# Patient Record
Sex: FEMALE | Race: WHITE | NOT HISPANIC OR LATINO | URBAN - METROPOLITAN AREA
[De-identification: names, ages, dates, MRNs, and addresses within clinical notes are randomized per-mention and may not be internally consistent; named-entity substitution may affect disease eponyms.]

---

## 2017-06-02 ENCOUNTER — HOSPITAL ENCOUNTER (EMERGENCY)
Facility: CLINIC | Age: 27
Discharge: HOME | End: 2017-06-02
Attending: EMERGENCY MEDICINE | Admitting: EMERGENCY MEDICINE

## 2017-06-02 VITALS
DIASTOLIC BLOOD PRESSURE: 56 MMHG | WEIGHT: 214 LBS | TEMPERATURE: 99.9 F | HEIGHT: 63 IN | RESPIRATION RATE: 18 BRPM | HEART RATE: 97 BPM | OXYGEN SATURATION: 98 % | SYSTOLIC BLOOD PRESSURE: 146 MMHG | BODY MASS INDEX: 37.92 KG/M2

## 2017-06-02 DIAGNOSIS — M54.9 BACK PAIN, UNSPECIFIED BACK LOCATION, UNSPECIFIED BACK PAIN LATERALITY, UNSPECIFIED CHRONICITY: ICD-10-CM

## 2017-06-02 DIAGNOSIS — R00.0 TACHYCARDIA: ICD-10-CM

## 2017-06-02 LAB
ALBUMIN SERPL-MCNC: 2.8 G/DL (ref 3.4–5)
ALBUMIN UR-MCNC: NEGATIVE MG/DL
ALP SERPL-CCNC: 100 U/L (ref 40–150)
ALT SERPL W P-5'-P-CCNC: 56 U/L (ref 0–50)
ANION GAP SERPL CALCULATED.3IONS-SCNC: 6 MMOL/L (ref 3–14)
APPEARANCE UR: CLEAR
AST SERPL W P-5'-P-CCNC: 39 U/L (ref 0–45)
BASOPHILS # BLD AUTO: 0 10E9/L (ref 0–0.2)
BASOPHILS NFR BLD AUTO: 0.1 %
BILIRUB SERPL-MCNC: 0.4 MG/DL (ref 0.2–1.3)
BILIRUB UR QL STRIP: NEGATIVE
BUN SERPL-MCNC: 8 MG/DL (ref 7–30)
CALCIUM SERPL-MCNC: 8.6 MG/DL (ref 8.5–10.1)
CHLORIDE SERPL-SCNC: 100 MMOL/L (ref 94–109)
CO2 SERPL-SCNC: 28 MMOL/L (ref 20–32)
COLOR UR AUTO: YELLOW
CREAT SERPL-MCNC: 0.67 MG/DL (ref 0.52–1.04)
DIFFERENTIAL METHOD BLD: ABNORMAL
EOSINOPHIL # BLD AUTO: 0.1 10E9/L (ref 0–0.7)
EOSINOPHIL NFR BLD AUTO: 1 %
ERYTHROCYTE [DISTWIDTH] IN BLOOD BY AUTOMATED COUNT: 13.9 % (ref 10–15)
GFR SERPL CREATININE-BSD FRML MDRD: ABNORMAL ML/MIN/1.7M2
GLUCOSE SERPL-MCNC: 139 MG/DL (ref 70–99)
GLUCOSE UR STRIP-MCNC: NEGATIVE MG/DL
HCT VFR BLD AUTO: 34.6 % (ref 35–47)
HGB BLD-MCNC: 11.5 G/DL (ref 11.7–15.7)
HGB UR QL STRIP: NEGATIVE
IMM GRANULOCYTES # BLD: 0.1 10E9/L (ref 0–0.4)
IMM GRANULOCYTES NFR BLD: 1.5 %
KETONES UR STRIP-MCNC: NEGATIVE MG/DL
LACTATE BLD-SCNC: 1 MMOL/L (ref 0.7–2.1)
LEUKOCYTE ESTERASE UR QL STRIP: NEGATIVE
LYMPHOCYTES # BLD AUTO: 2 10E9/L (ref 0.8–5.3)
LYMPHOCYTES NFR BLD AUTO: 22.4 %
MCH RBC QN AUTO: 28.3 PG (ref 26.5–33)
MCHC RBC AUTO-ENTMCNC: 33.2 G/DL (ref 31.5–36.5)
MCV RBC AUTO: 85 FL (ref 78–100)
MONOCYTES # BLD AUTO: 0.7 10E9/L (ref 0–1.3)
MONOCYTES NFR BLD AUTO: 7.6 %
MUCOUS THREADS #/AREA URNS LPF: PRESENT /LPF
NEUTROPHILS # BLD AUTO: 6 10E9/L (ref 1.6–8.3)
NEUTROPHILS NFR BLD AUTO: 67.4 %
NITRATE UR QL: NEGATIVE
NRBC # BLD AUTO: 0 10*3/UL
NRBC BLD AUTO-RTO: 0 /100
PH UR STRIP: 6 PH (ref 5–7)
PLATELET # BLD AUTO: 338 10E9/L (ref 150–450)
POTASSIUM SERPL-SCNC: 3.7 MMOL/L (ref 3.4–5.3)
PROT SERPL-MCNC: 7.6 G/DL (ref 6.8–8.8)
RBC # BLD AUTO: 4.07 10E12/L (ref 3.8–5.2)
RBC #/AREA URNS AUTO: 1 /HPF (ref 0–2)
SODIUM SERPL-SCNC: 134 MMOL/L (ref 133–144)
SP GR UR STRIP: 1.02 (ref 1–1.03)
SQUAMOUS #/AREA URNS AUTO: 1 /HPF (ref 0–1)
TROPONIN I SERPL-MCNC: NORMAL UG/L (ref 0–0.04)
URN SPEC COLLECT METH UR: ABNORMAL
UROBILINOGEN UR STRIP-MCNC: NORMAL MG/DL (ref 0–2)
WBC # BLD AUTO: 8.9 10E9/L (ref 4–11)
WBC #/AREA URNS AUTO: 2 /HPF (ref 0–2)

## 2017-06-02 PROCEDURE — 93010 ELECTROCARDIOGRAM REPORT: CPT | Mod: Z6 | Performed by: EMERGENCY MEDICINE

## 2017-06-02 PROCEDURE — 96375 TX/PRO/DX INJ NEW DRUG ADDON: CPT | Mod: 59 | Performed by: EMERGENCY MEDICINE

## 2017-06-02 PROCEDURE — 96361 HYDRATE IV INFUSION ADD-ON: CPT | Performed by: EMERGENCY MEDICINE

## 2017-06-02 PROCEDURE — 83605 ASSAY OF LACTIC ACID: CPT | Performed by: EMERGENCY MEDICINE

## 2017-06-02 PROCEDURE — 81001 URINALYSIS AUTO W/SCOPE: CPT | Performed by: EMERGENCY MEDICINE

## 2017-06-02 PROCEDURE — 99284 EMERGENCY DEPT VISIT MOD MDM: CPT | Mod: 25 | Performed by: EMERGENCY MEDICINE

## 2017-06-02 PROCEDURE — 87086 URINE CULTURE/COLONY COUNT: CPT | Performed by: EMERGENCY MEDICINE

## 2017-06-02 PROCEDURE — 80053 COMPREHEN METABOLIC PANEL: CPT | Performed by: EMERGENCY MEDICINE

## 2017-06-02 PROCEDURE — 96374 THER/PROPH/DIAG INJ IV PUSH: CPT | Mod: 59 | Performed by: EMERGENCY MEDICINE

## 2017-06-02 PROCEDURE — 84484 ASSAY OF TROPONIN QUANT: CPT | Performed by: EMERGENCY MEDICINE

## 2017-06-02 PROCEDURE — 93005 ELECTROCARDIOGRAM TRACING: CPT | Performed by: EMERGENCY MEDICINE

## 2017-06-02 PROCEDURE — 25000128 H RX IP 250 OP 636: Performed by: EMERGENCY MEDICINE

## 2017-06-02 PROCEDURE — 99285 EMERGENCY DEPT VISIT HI MDM: CPT | Mod: 25 | Performed by: EMERGENCY MEDICINE

## 2017-06-02 PROCEDURE — 85025 COMPLETE CBC W/AUTO DIFF WBC: CPT | Performed by: EMERGENCY MEDICINE

## 2017-06-02 RX ORDER — KETOROLAC TROMETHAMINE 30 MG/ML
15 INJECTION, SOLUTION INTRAMUSCULAR; INTRAVENOUS ONCE
Status: COMPLETED | OUTPATIENT
Start: 2017-06-02 | End: 2017-06-02

## 2017-06-02 RX ADMIN — HYDROMORPHONE HYDROCHLORIDE 1 MG: 1 INJECTION, SOLUTION INTRAMUSCULAR; INTRAVENOUS; SUBCUTANEOUS at 00:37

## 2017-06-02 RX ADMIN — SODIUM CHLORIDE 1000 ML: 9 INJECTION, SOLUTION INTRAVENOUS at 00:37

## 2017-06-02 RX ADMIN — KETOROLAC TROMETHAMINE 15 MG: 30 INJECTION, SOLUTION INTRAMUSCULAR at 01:09

## 2017-06-02 NOTE — ED NOTES
Pt presents to ED with complaints of back pain and chest pain making it difficult to breath. Pt states she was admitted for [facility] 5/25-5/30 for abdominal pain and was d/c with plans to have endoscopy and colonoscopy outpatient. Pt states she developed these symptoms and her fever returned to 100.3 at home. Sepsis protocol alerted, order set started by triage RN.

## 2017-06-02 NOTE — ED AVS SNAPSHOT
[facility], Emergency Department    [address] [address]    Phone:  [phone]                                       Dahlia Hayes   MRN: [id number]    Department:  [facility], Emergency Department   Date of Visit:  6/2/2017           Patient Information     Date Of Birth          1990        Your diagnoses for this visit were:     Back pain, unspecified back location, unspecified back pain laterality, unspecified chronicity        You were seen by Dante Peoples MD.        Discharge Instructions       Please follow up with your endoscopy later today.        24 Hour Appointment Hotline       To make an appointment at any [facility], call [phone] ([phone]). If you don't have a family doctor or clinic, we will help you find one. [facility] are conveniently located to serve the needs of you and your family.             Review of your medicines      Our records show that you are taking the medicines listed below. If these are incorrect, please call your family doctor or clinic.        Dose / Directions Last dose taken    * amphetamine-dextroamphetamine 30 MG per 24 hr capsule   Commonly known as:  ADDERALL XR        Refills:  0        * amphetamine-dextroamphetamine 10 MG per 24 hr capsule   Commonly known as:  ADDERALL XR        as needed   Refills:  0        BENZONATATE PO   Dose:  100 mg        Take 100 mg by mouth 3 times daily as needed for cough   Refills:  0        escitalopram 10 MG tablet   Commonly known as:  LEXAPRO        Refills:  0        OXYCODONE HCL PO   Dose:  5 mg        Take 5 mg by mouth every 4 hours as needed   Refills:  0        PANTOPRAZOLE SODIUM PO   Dose:  40 mg        Take 40 mg by mouth 2 times daily (before meals)   Refills:  0        * Notice:  This list has 2 medication(s) that are the same as other medications prescribed for you. Read the directions carefully, and ask your doctor or other care provider to review them  with you.            Procedures and tests performed during your visit     CBC with platelets differential    Comprehensive metabolic panel    EKG 12 lead    Lactic acid    Troponin I    UA with Microscopic    Urine Culture      Orders Needing Specimen Collection     None      Pending Results     Date and Time Order Name Status Description    6/2/2017 0104 EKG 12 lead Preliminary     6/2/2017 0034 Urine Culture In process             Pending Culture Results     Date and Time Order Name Status Description    6/2/2017 0034 Urine Culture In process             Pending Results Instructions     If you had any lab results that were not finalized at the time of your Discharge, you can call the ED Lab Result RN at [phone]. You will be contacted by this team for any positive Lab results or changes in treatment. The nurses are available 7 days a week from 10A to 6:30P.  You can leave a message 24 hours per day and they will return your call.        Thank you for choosing [facility]       Thank you for choosing [facility] for your care. Our goal is always to provide you with excellent care. Hearing back from our patients is one way we can continue to improve our services. Please take a few minutes to complete the written survey that you may receive in the mail after you visit with us. Thank you!        Care EveryWhere ID     This is your Care EveryWhere ID. This could be used by other organizations to access your [facility] medical records  [id number]        After Visit Summary       This is your record. Keep this with you and show to your community pharmacist(s) and doctor(s) at your next visit.

## 2017-06-02 NOTE — ED AVS SNAPSHOT
[facility], [place], Emergency Department    [address] [place] [address]    Phone:  [phone]                                       Dahlia Hayes   MRN: [id number]    Department:  [facility], Emergency Department   Date of Visit:  6/2/2017           After Visit Summary Signature Page     I have received my discharge instructions, and my questions have been answered. I have discussed any challenges I see with this plan with the nurse or doctor.    ..........................................................................................................................................  Patient/Patient Representative Signature      ..........................................................................................................................................  Patient Representative Print Name and Relationship to Patient    ..................................................               ................................................  Date                                            Time    ..........................................................................................................................................  Reviewed by Signature/Title    ...................................................              ..............................................  Date                                                            Time

## 2017-06-02 NOTE — ED PROVIDER NOTES
"  History     Chief Complaint   Patient presents with     Back Pain     Shortness of Breath     HPI  Dahlia Hayes is a 26 year old female with a history of ADHD, anxiety, and depression who presents to the ED for evaluation of back pain. The patient reports that she was admitted to [facility] 5/25-5/30 for bloody diarrhea and abdominal pain, where she underwent CT, ultrasound, and chest x-ray; \"CT abdomen showed mild fluid retention and some mesenteric stranding with liquid stool, questioning infectious vs inflammatory etiology.\" and so she was scheduled for EGD at 1:30 PM today and colonoscopy next week. The patient comes in today with complaint of diffuse mid-back pain as well as bilateral shoulder pain that began 2 days ago and which has been worsening since. Pain occurs on both sides of her back. She reports that the pain is intensely provoked with deep breaths and with coughs. She denies a history of this pain. The patient reports that she's had resolution of her diarrhea, though she has continued to have abdominal. Last stool was 2 days ago. No vomiting. She denies any urinary symptoms such as dysuria or hematuria. She does report a temperature up to 100.1  F at home earlier, did not take an antipyretic prior to arrival. She denies any other symptoms.    I have reviewed the Medications, Allergies, Past Medical and Surgical History, and Social History in the Epic system.    Current Facility-Administered Medications   Medication     0.9% sodium chloride BOLUS     Current Outpatient Prescriptions   Medication     BENZONATATE PO     OXYCODONE HCL PO     PANTOPRAZOLE SODIUM PO     amphetamine-dextroamphetamine (ADDERALL XR) 10 MG per capsule     amphetamine-dextroamphetamine (ADDERALL XR) 30 MG per capsule     escitalopram (LEXAPRO) 10 MG tablet     Past Medical History:   Diagnosis Date     ADHD (attention deficit hyperactivity disorder)     adderall      Anxiety     lexapro  Dr. Carrillo PN      breast lumps 2015 " "   benign on mammogram      Chronic tonsillitis        Past Surgical History:   Procedure Laterality Date     HEAD & NECK SURGERY      WISDOM TEETH     TONSILLECTOMY ADULT  6/7/2012    Procedure:TONSILLECTOMY ADULT; TONSILLECTOMY ; Surgeon:JAMES BURNS; Location:[facility]       Family History   Problem Relation Age of Onset     Anxiety Disorder Mother      Anxiety Disorder Maternal Grandmother      Anxiety Disorder Sister      Anxiety Disorder Brother      Substance Abuse Sister      Substance Abuse Brother      MENTAL ILLNESS Mother      MENTAL ILLNESS Brother      Depression Mother      Depression Maternal Grandmother      Thyroid Disease Mother        Social History   Substance Use Topics     Smoking status: Never Smoker     Smokeless tobacco: Never Used     Alcohol use No      No Known Allergies    Review of Systems   All other systems reviewed and are negative.    14 pt ROS completed and negative except as noted above in HPI    Physical Exam   Pulse: 119  Temp: 99.9  F (37.7  C)  Resp: 22  Height: 160 cm (5' 3\")  Weight: 97.1 kg (214 lb)  SpO2: 96 %  Physical Exam   Constitutional: She is oriented to person, place, and time. No distress.   HENT:   Head: Normocephalic and atraumatic.   Right Ear: External ear normal.   Left Ear: External ear normal.   Mouth/Throat: Oropharynx is clear and moist. No oropharyngeal exudate.   Eyes: Conjunctivae are normal. Pupils are equal, round, and reactive to light. Right eye exhibits no discharge. Left eye exhibits no discharge.   Neck: Normal range of motion. Neck supple.   Cardiovascular: Intact distal pulses.    tachycardic   Pulmonary/Chest: Effort normal. No respiratory distress. She has no wheezes. She has no rales.   Abdominal: She exhibits no distension. There is no tenderness. There is no rebound and no guarding.   Musculoskeletal: Normal range of motion. She exhibits no edema or tenderness.   Neurological: She is alert and oriented to person, place, and time. She " exhibits normal muscle tone.   Skin: Skin is warm and dry. No rash noted. She is not diaphoretic.   Psychiatric: She has a normal mood and affect. Her behavior is normal. Thought content normal.   Nursing note and vitals reviewed.      ED Course     ED Course     Procedures             EKG Interpretation:      Interpreted by Dante Peoples  Time reviewed: 110  Symptoms at time of EKG: tachycardia  Rhythm: ST  Rate: 120  Axis: normal  Ectopy: none  Conduction: normal  ST Segments/ T Waves: No ST-T wave changes  Q Waves: none  Comparison to prior: No old EKG available    Clinical Impression: ST            Critical Care time:  none             Labs Ordered and Resulted from Time of ED Arrival Up to the Time of Departure from the ED - No data to display         Assessments & Plan (with Medical Decision Making)   1.  Back pain    28 yo F who was recently admitted for abdominal pain and bloody stools who presents with back pain.  She is scheduled for an EGD and colonoscopy for further evaluation.  The patient was tachycardic, but afebrile.  Her exam was benign as was her lab evaluation with a lactic acid of 1 and WBC of 8.9. Her urinalysis showed no hematuria or signs of infection.  Pain was improved with Toradol and Dilaudid and repeat exam again was benign.  Pain could be from inflammatory process causign her diarrhea.  Recent imaging was unremarkable.  No evidence of SBI.  She will be discharged and follow up with her endoscopy and colonoscopy and return if worsening symptoms.       I have reviewed the nursing notes.    I have reviewed the findings, diagnosis, plan and need for follow up with the patient.    New Prescriptions    No medications on file       Final diagnoses:   None     IOneil, am serving as a trained medical scribe to document services personally performed by Dante Peoples MD, based on the provider's statements to me.      IDante MD, was physically present and have  reviewed and verified the accuracy of this note documented by Oneil Marquez.    6/2/2017   [facility], [place], EMERGENCY DEPARTMENT     Dante Peoples MD  07/31/17 0920

## 2017-06-03 ENCOUNTER — APPOINTMENT (OUTPATIENT)
Dept: CT IMAGING | Facility: CLINIC | Age: 27
DRG: 194 | End: 2017-06-03
Attending: EMERGENCY MEDICINE

## 2017-06-03 ENCOUNTER — HOSPITAL ENCOUNTER (INPATIENT)
Facility: CLINIC | Age: 27
LOS: 2 days | Discharge: HOME | DRG: 194 | End: 2017-06-06
Attending: EMERGENCY MEDICINE | Admitting: INTERNAL MEDICINE

## 2017-06-03 DIAGNOSIS — J18.9 PNEUMONIA OF RIGHT LOWER LOBE DUE TO INFECTIOUS ORGANISM: ICD-10-CM

## 2017-06-03 DIAGNOSIS — J90 PLEURAL EFFUSION: ICD-10-CM

## 2017-06-03 DIAGNOSIS — R07.81 PLEURITIC CHEST PAIN: ICD-10-CM

## 2017-06-03 LAB
ALBUMIN SERPL-MCNC: 2.6 G/DL (ref 3.4–5)
ALP SERPL-CCNC: 115 U/L (ref 40–150)
ALT SERPL W P-5'-P-CCNC: 56 U/L (ref 0–50)
ANION GAP SERPL CALCULATED.3IONS-SCNC: 6 MMOL/L (ref 3–14)
AST SERPL W P-5'-P-CCNC: 42 U/L (ref 0–45)
BACTERIA SPEC CULT: NORMAL
BASOPHILS # BLD AUTO: 0 10E9/L (ref 0–0.2)
BASOPHILS NFR BLD AUTO: 0.1 %
BILIRUB SERPL-MCNC: 0.4 MG/DL (ref 0.2–1.3)
BUN SERPL-MCNC: 4 MG/DL (ref 7–30)
CALCIUM SERPL-MCNC: 8.7 MG/DL (ref 8.5–10.1)
CHLORIDE SERPL-SCNC: 103 MMOL/L (ref 94–109)
CO2 SERPL-SCNC: 28 MMOL/L (ref 20–32)
CREAT SERPL-MCNC: 0.51 MG/DL (ref 0.52–1.04)
CRP SERPL-MCNC: 150 MG/L (ref 0–8)
D DIMER PPP FEU-MCNC: 3.8 UG/ML FEU (ref 0–0.5)
DIFFERENTIAL METHOD BLD: ABNORMAL
EOSINOPHIL # BLD AUTO: 0 10E9/L (ref 0–0.7)
EOSINOPHIL NFR BLD AUTO: 0.2 %
ERYTHROCYTE [DISTWIDTH] IN BLOOD BY AUTOMATED COUNT: 13.8 % (ref 10–15)
ERYTHROCYTE [SEDIMENTATION RATE] IN BLOOD BY WESTERGREN METHOD: 70 MM/H (ref 0–20)
GFR SERPL CREATININE-BSD FRML MDRD: ABNORMAL ML/MIN/1.7M2
GLUCOSE SERPL-MCNC: 111 MG/DL (ref 70–99)
HCT VFR BLD AUTO: 35.1 % (ref 35–47)
HGB BLD-MCNC: 11.6 G/DL (ref 11.7–15.7)
IMM GRANULOCYTES # BLD: 0.1 10E9/L (ref 0–0.4)
IMM GRANULOCYTES NFR BLD: 0.6 %
INTERPRETATION ECG - MUSE: NORMAL
INTERPRETATION ECG - MUSE: NORMAL
LACTATE BLD-SCNC: 1.4 MMOL/L (ref 0.7–2.1)
LYMPHOCYTES # BLD AUTO: 1.9 10E9/L (ref 0.8–5.3)
LYMPHOCYTES NFR BLD AUTO: 13.8 %
Lab: NORMAL
MCH RBC QN AUTO: 28.5 PG (ref 26.5–33)
MCHC RBC AUTO-ENTMCNC: 33 G/DL (ref 31.5–36.5)
MCV RBC AUTO: 86 FL (ref 78–100)
MICRO REPORT STATUS: NORMAL
MONOCYTES # BLD AUTO: 0.9 10E9/L (ref 0–1.3)
MONOCYTES NFR BLD AUTO: 6.6 %
NEUTROPHILS # BLD AUTO: 10.9 10E9/L (ref 1.6–8.3)
NEUTROPHILS NFR BLD AUTO: 78.7 %
NRBC # BLD AUTO: 0 10*3/UL
NRBC BLD AUTO-RTO: 0 /100
PLATELET # BLD AUTO: 447 10E9/L (ref 150–450)
POTASSIUM SERPL-SCNC: 3.9 MMOL/L (ref 3.4–5.3)
PROT SERPL-MCNC: 7.8 G/DL (ref 6.8–8.8)
RBC # BLD AUTO: 4.07 10E12/L (ref 3.8–5.2)
SODIUM SERPL-SCNC: 137 MMOL/L (ref 133–144)
SPECIMEN SOURCE: NORMAL
WBC # BLD AUTO: 13.9 10E9/L (ref 4–11)

## 2017-06-03 PROCEDURE — 25000128 H RX IP 250 OP 636: Performed by: EMERGENCY MEDICINE

## 2017-06-03 PROCEDURE — 83605 ASSAY OF LACTIC ACID: CPT | Performed by: EMERGENCY MEDICINE

## 2017-06-03 PROCEDURE — 85652 RBC SED RATE AUTOMATED: CPT | Performed by: EMERGENCY MEDICINE

## 2017-06-03 PROCEDURE — 80053 COMPREHEN METABOLIC PANEL: CPT | Performed by: EMERGENCY MEDICINE

## 2017-06-03 PROCEDURE — 85379 FIBRIN DEGRADATION QUANT: CPT | Performed by: EMERGENCY MEDICINE

## 2017-06-03 PROCEDURE — 96367 TX/PROPH/DG ADDL SEQ IV INF: CPT

## 2017-06-03 PROCEDURE — 96375 TX/PRO/DX INJ NEW DRUG ADDON: CPT

## 2017-06-03 PROCEDURE — 87040 BLOOD CULTURE FOR BACTERIA: CPT | Performed by: EMERGENCY MEDICINE

## 2017-06-03 PROCEDURE — 86140 C-REACTIVE PROTEIN: CPT | Performed by: EMERGENCY MEDICINE

## 2017-06-03 PROCEDURE — 96376 TX/PRO/DX INJ SAME DRUG ADON: CPT

## 2017-06-03 PROCEDURE — 25000125 ZZHC RX 250: Performed by: EMERGENCY MEDICINE

## 2017-06-03 PROCEDURE — 25000132 ZZH RX MED GY IP 250 OP 250 PS 637: Performed by: EMERGENCY MEDICINE

## 2017-06-03 PROCEDURE — 93005 ELECTROCARDIOGRAM TRACING: CPT

## 2017-06-03 PROCEDURE — 99285 EMERGENCY DEPT VISIT HI MDM: CPT | Mod: 25

## 2017-06-03 PROCEDURE — 85025 COMPLETE CBC W/AUTO DIFF WBC: CPT | Performed by: EMERGENCY MEDICINE

## 2017-06-03 PROCEDURE — 71260 CT THORAX DX C+: CPT

## 2017-06-03 PROCEDURE — 25000128 H RX IP 250 OP 636

## 2017-06-03 PROCEDURE — 96365 THER/PROPH/DIAG IV INF INIT: CPT

## 2017-06-03 RX ORDER — PIPERACILLIN SODIUM, TAZOBACTAM SODIUM 4; .5 G/20ML; G/20ML
4.5 INJECTION, POWDER, LYOPHILIZED, FOR SOLUTION INTRAVENOUS ONCE
Status: COMPLETED | OUTPATIENT
Start: 2017-06-03 | End: 2017-06-04

## 2017-06-03 RX ORDER — KETOROLAC TROMETHAMINE 30 MG/ML
30 INJECTION, SOLUTION INTRAMUSCULAR; INTRAVENOUS ONCE
Status: COMPLETED | OUTPATIENT
Start: 2017-06-03 | End: 2017-06-03

## 2017-06-03 RX ORDER — HYDROMORPHONE HYDROCHLORIDE 1 MG/ML
0.5 INJECTION, SOLUTION INTRAMUSCULAR; INTRAVENOUS; SUBCUTANEOUS
Status: COMPLETED | OUTPATIENT
Start: 2017-06-03 | End: 2017-06-03

## 2017-06-03 RX ORDER — ACETAMINOPHEN 500 MG
1000 TABLET ORAL ONCE
Status: COMPLETED | OUTPATIENT
Start: 2017-06-03 | End: 2017-06-03

## 2017-06-03 RX ORDER — SODIUM CHLORIDE 9 MG/ML
INJECTION, SOLUTION INTRAVENOUS CONTINUOUS
Status: DISCONTINUED | OUTPATIENT
Start: 2017-06-03 | End: 2017-06-06

## 2017-06-03 RX ORDER — LEVOFLOXACIN 5 MG/ML
750 INJECTION, SOLUTION INTRAVENOUS ONCE
Status: COMPLETED | OUTPATIENT
Start: 2017-06-03 | End: 2017-06-04

## 2017-06-03 RX ORDER — IOPAMIDOL 755 MG/ML
76 INJECTION, SOLUTION INTRAVASCULAR ONCE
Status: COMPLETED | OUTPATIENT
Start: 2017-06-03 | End: 2017-06-03

## 2017-06-03 RX ADMIN — HYDROMORPHONE HYDROCHLORIDE 0.5 MG: 1 INJECTION, SOLUTION INTRAMUSCULAR; INTRAVENOUS; SUBCUTANEOUS at 19:47

## 2017-06-03 RX ADMIN — HYDROMORPHONE HYDROCHLORIDE 0.5 MG: 1 INJECTION, SOLUTION INTRAMUSCULAR; INTRAVENOUS; SUBCUTANEOUS at 21:16

## 2017-06-03 RX ADMIN — ACETAMINOPHEN 1000 MG: 500 TABLET, FILM COATED ORAL at 23:36

## 2017-06-03 RX ADMIN — KETOROLAC TROMETHAMINE 30 MG: 30 INJECTION, SOLUTION INTRAMUSCULAR at 22:08

## 2017-06-03 RX ADMIN — SODIUM CHLORIDE 99 ML: 9 INJECTION, SOLUTION INTRAVENOUS at 21:46

## 2017-06-03 RX ADMIN — PIPERACILLIN AND TAZOBACTAM 4.5 G: 4; .5 INJECTION, POWDER, FOR SOLUTION INTRAVENOUS at 23:27

## 2017-06-03 RX ADMIN — IOPAMIDOL 76 ML: 755 INJECTION, SOLUTION INTRAVENOUS at 21:45

## 2017-06-03 RX ADMIN — SODIUM CHLORIDE 2913 ML: 9 INJECTION, SOLUTION INTRAVENOUS at 19:46

## 2017-06-03 RX ADMIN — HYDROMORPHONE HYDROCHLORIDE 0.5 MG: 1 INJECTION, SOLUTION INTRAMUSCULAR; INTRAVENOUS; SUBCUTANEOUS at 20:31

## 2017-06-03 ASSESSMENT — ENCOUNTER SYMPTOMS
COUGH: 1
SHORTNESS OF BREATH: 1
APPETITE CHANGE: 1
ABDOMINAL PAIN: 1
FEVER: 1

## 2017-06-03 NOTE — IP AVS SNAPSHOT
[facility] [address] [facility]    [address] [place] [address]    Phone:  [phone]                                       After Visit Summary   6/3/2017    Dahlia Hayes    MRN: [id number]           After Visit Summary Signature Page     I have received my discharge instructions, and my questions have been answered. I have discussed any challenges I see with this plan with the nurse or doctor.    ..........................................................................................................................................  Patient/Patient Representative Signature      ..........................................................................................................................................  Patient Representative Print Name and Relationship to Patient    ..................................................               ................................................  Date                                            Time    ..........................................................................................................................................  Reviewed by Signature/Title    ...................................................              ..............................................  Date                                                            Time

## 2017-06-03 NOTE — IP AVS SNAPSHOT
MRN:[id number]                      After Visit Summary   6/3/2017    Dahlia Hayes    MRN: [id number]           Thank you!     Thank you for choosing [facility] for your care. Our goal is always to provide you with excellent care. Hearing back from our patients is one way we can continue to improve our services. Please take a few minutes to complete the written survey that you may receive in the mail after you visit with us. Thank you!        Patient Information     Date Of Birth          1990        Designated Caregiver       Most Recent Value    Caregiver    Will someone help with your care after discharge? no      About your hospital stay     You were admitted on:  June 4, 2017 You last received care in the:  [facility] [address] [facility]    You were discharged on:  June 6, 2017        Reason for your hospital stay       Pleural effusion.                  Who to Call     For medical emergencies, please call 911.  For non-urgent questions about your medical care, please call your primary care provider or clinic, [phone]          Attending Provider     Provider Specialty    Patricia Ludwig MD Emergency Medicine    [facility], Felice Vazquez MD Internal Medicine       Primary Care Provider Office Phone # Fax #    Isidra Mejias [phone]      After Care Instructions     Activity       Your activity upon discharge: activity as tolerated            Diet       Follow this diet upon discharge: Orders Placed This Encounter      Regular Diet Adult                  Follow-up Appointments     Follow-up and recommended labs and tests        Appointment with DR LARRY 6/13 @12:45            Follow-up and recommended labs and tests        Follow up with primary care provider, ISIDRA MEJIAS, within 7 days for hospital follow- up.  The following labs/tests are recommended: cbc/bmp/CXR.    Follow up with Dr. Cummins from pulmonology in 1 month, call to schedule appointment  at [phone]                  Future tests that were ordered for you     Basic metabolic panel           CBC with platelets       Last Lab Result: Hemoglobin (g/dL)       Date                     Value                 06/06/2017               10.7 (L)         ----------            XR Chest 2 Views                 Pending Results     Date and Time Order Name Status Description    6/4/2017 0214 Sputum Culture Aerobic Bacterial Preliminary     6/3/2017 1931 Blood culture Preliminary     6/3/2017 1931 Blood culture Preliminary             Statement of Approval     Ordered          06/06/17 1417  I have reviewed and agree with all the recommendations and orders detailed in this document.  EFFECTIVE NOW     Approved and electronically signed by:  Francesco Mosher DO             Admission Information     Date & Time Provider Department Dept. Phone    6/3/2017 Felice uSe MD [facility] [address] [facility] [phone]      Your Vitals Were     Blood Pressure Pulse Temperature Respirations Weight Pulse Oximetry    123/73 (BP Location: Left arm) 72 99  F (37.2  C) (Oral) 16 101.2 kg (223 lb 1.7 oz) 93%    BMI (Body Mass Index)                   39.52 kg/m2           Care EveryWhere ID     This is your Care EveryWhere ID. This could be used by other organizations to access your [facility] medical records  [id number]           Review of your medicines      START taking        Dose / Directions    levofloxacin 500 MG tablet   Commonly known as:  LEVAQUIN   Used for:  Pneumonia of right lower lobe due to infectious organism        Dose:  500 mg   Take 1 tablet (500 mg) by mouth daily for 6 days   Quantity:  6 tablet   Refills:  0         CONTINUE these medicines which have NOT CHANGED        Dose / Directions    * amphetamine-dextroamphetamine 30 MG per 24 hr capsule   Commonly known as:  ADDERALL XR   Used for:  Visit for preventive health examination        Dose:  30 mg   Take 30 mg by mouth daily    Refills:  0       * amphetamine-dextroamphetamine 10 MG per 24 hr capsule   Commonly known as:  ADDERALL XR   Used for:  Visit for preventive health examination        Dose:  10 mg   10 mg daily   Refills:  0       BENZONATATE PO        Dose:  100 mg   Take 100 mg by mouth 3 times daily as needed for cough   Refills:  0       escitalopram 10 MG tablet   Commonly known as:  LEXAPRO   Used for:  Visit for preventive health examination        Dose:  10 mg   Take 10 mg by mouth daily   Refills:  0       OXYCODONE HCL PO        Dose:  5 mg   Take 5 mg by mouth every 4 hours as needed   Refills:  0       PANTOPRAZOLE SODIUM PO        Dose:  40 mg   Take 40 mg by mouth 2 times daily (before meals)   Refills:  0       * Notice:  This list has 2 medication(s) that are the same as other medications prescribed for you. Read the directions carefully, and ask your doctor or other care provider to review them with you.         Where to get your medicines      These medications were sent to [facility] - [address]  [address], [place] [address]     Phone:  [phone]     levofloxacin 500 MG tablet                Protect others around you: Learn how to safely use, store and throw away your medicines at www.disposemymeds.org.             Medication List: This is a list of all your medications and when to take them. Check marks below indicate your daily home schedule. Keep this list as a reference.      Medications           Morning Afternoon Evening Bedtime As Needed    * amphetamine-dextroamphetamine 30 MG per 24 hr capsule   Commonly known as:  ADDERALL XR   Take 30 mg by mouth daily                                * amphetamine-dextroamphetamine 10 MG per 24 hr capsule   Commonly known as:  ADDERALL XR   10 mg daily                                BENZONATATE PO   Take 100 mg by mouth 3 times daily as needed for cough   Last time this was given:  100 mg on 6/6/2017  4:25 AM                                 escitalopram 10 MG tablet   Commonly known as:  LEXAPRO   Take 10 mg by mouth daily   Last time this was given:  10 mg on 6/6/2017  9:12 AM                                levofloxacin 500 MG tablet   Commonly known as:  LEVAQUIN   Take 1 tablet (500 mg) by mouth daily for 6 days                                OXYCODONE HCL PO   Take 5 mg by mouth every 4 hours as needed                                PANTOPRAZOLE SODIUM PO   Take 40 mg by mouth 2 times daily (before meals)   Last time this was given:  40 mg on 6/6/2017  5:11 PM                                * Notice:  This list has 2 medication(s) that are the same as other medications prescribed for you. Read the directions carefully, and ask your doctor or other care provider to review them with you.

## 2017-06-04 PROBLEM — J18.9 PNEUMONIA: Status: ACTIVE | Noted: 2017-06-04

## 2017-06-04 LAB
ANION GAP SERPL CALCULATED.3IONS-SCNC: 7 MMOL/L (ref 3–14)
BUN SERPL-MCNC: 4 MG/DL (ref 7–30)
CALCIUM SERPL-MCNC: 8.2 MG/DL (ref 8.5–10.1)
CHLORIDE SERPL-SCNC: 110 MMOL/L (ref 94–109)
CO2 SERPL-SCNC: 25 MMOL/L (ref 20–32)
CREAT SERPL-MCNC: 0.47 MG/DL (ref 0.52–1.04)
ERYTHROCYTE [DISTWIDTH] IN BLOOD BY AUTOMATED COUNT: 13.9 % (ref 10–15)
GFR SERPL CREATININE-BSD FRML MDRD: ABNORMAL ML/MIN/1.7M2
GLUCOSE SERPL-MCNC: 108 MG/DL (ref 70–99)
GRAM STN SPEC: NORMAL
HCT VFR BLD AUTO: 31.9 % (ref 35–47)
HGB BLD-MCNC: 10.6 G/DL (ref 11.7–15.7)
INR PPP: 1.25 (ref 0.86–1.14)
Lab: NORMAL
MCH RBC QN AUTO: 28.5 PG (ref 26.5–33)
MCHC RBC AUTO-ENTMCNC: 33.2 G/DL (ref 31.5–36.5)
MCV RBC AUTO: 86 FL (ref 78–100)
MICRO REPORT STATUS: NORMAL
PLATELET # BLD AUTO: 398 10E9/L (ref 150–450)
PLATELET # BLD AUTO: NORMAL 10E9/L (ref 150–450)
POTASSIUM SERPL-SCNC: 4 MMOL/L (ref 3.4–5.3)
PROCALCITONIN SERPL-MCNC: NORMAL NG/ML
RBC # BLD AUTO: 3.72 10E12/L (ref 3.8–5.2)
SODIUM SERPL-SCNC: 142 MMOL/L (ref 133–144)
SPECIMEN SOURCE: NORMAL
WBC # BLD AUTO: 11.7 10E9/L (ref 4–11)

## 2017-06-04 PROCEDURE — 84145 PROCALCITONIN (PCT): CPT | Performed by: INTERNAL MEDICINE

## 2017-06-04 PROCEDURE — 99223 1ST HOSP IP/OBS HIGH 75: CPT | Mod: AI | Performed by: INTERNAL MEDICINE

## 2017-06-04 PROCEDURE — 85610 PROTHROMBIN TIME: CPT | Performed by: INTERNAL MEDICINE

## 2017-06-04 PROCEDURE — 25000128 H RX IP 250 OP 636: Performed by: EMERGENCY MEDICINE

## 2017-06-04 PROCEDURE — 99207 ZZC APP CREDIT; MD BILLING SHARED VISIT: CPT | Performed by: HOSPITALIST

## 2017-06-04 PROCEDURE — 80048 BASIC METABOLIC PNL TOTAL CA: CPT | Performed by: INTERNAL MEDICINE

## 2017-06-04 PROCEDURE — 25000132 ZZH RX MED GY IP 250 OP 250 PS 637: Performed by: INTERNAL MEDICINE

## 2017-06-04 PROCEDURE — 36415 COLL VENOUS BLD VENIPUNCTURE: CPT | Performed by: INTERNAL MEDICINE

## 2017-06-04 PROCEDURE — 87070 CULTURE OTHR SPECIMN AEROBIC: CPT | Performed by: INTERNAL MEDICINE

## 2017-06-04 PROCEDURE — 12000000 ZZH R&B MED SURG/OB

## 2017-06-04 PROCEDURE — 25000128 H RX IP 250 OP 636: Performed by: INTERNAL MEDICINE

## 2017-06-04 PROCEDURE — 85027 COMPLETE CBC AUTOMATED: CPT | Performed by: INTERNAL MEDICINE

## 2017-06-04 PROCEDURE — 87205 SMEAR GRAM STAIN: CPT | Performed by: INTERNAL MEDICINE

## 2017-06-04 RX ORDER — PANTOPRAZOLE SODIUM 40 MG/1
40 TABLET, DELAYED RELEASE ORAL
Status: DISCONTINUED | OUTPATIENT
Start: 2017-06-04 | End: 2017-06-06 | Stop reason: HOSPADM

## 2017-06-04 RX ORDER — ESCITALOPRAM OXALATE 10 MG/1
10 TABLET ORAL DAILY
Status: DISCONTINUED | OUTPATIENT
Start: 2017-06-04 | End: 2017-06-06 | Stop reason: HOSPADM

## 2017-06-04 RX ORDER — CEFAZOLIN SODIUM 1 G/50ML
1250 SOLUTION INTRAVENOUS EVERY 8 HOURS
Status: DISCONTINUED | OUTPATIENT
Start: 2017-06-04 | End: 2017-06-06

## 2017-06-04 RX ORDER — PIPERACILLIN SODIUM, TAZOBACTAM SODIUM 4; .5 G/20ML; G/20ML
4.5 INJECTION, POWDER, LYOPHILIZED, FOR SOLUTION INTRAVENOUS EVERY 6 HOURS
Status: DISCONTINUED | OUTPATIENT
Start: 2017-06-04 | End: 2017-06-06 | Stop reason: HOSPADM

## 2017-06-04 RX ORDER — KETOROLAC TROMETHAMINE 30 MG/ML
30 INJECTION, SOLUTION INTRAMUSCULAR; INTRAVENOUS EVERY 6 HOURS PRN
Status: DISCONTINUED | OUTPATIENT
Start: 2017-06-04 | End: 2017-06-06 | Stop reason: HOSPADM

## 2017-06-04 RX ORDER — AMOXICILLIN 250 MG
1-2 CAPSULE ORAL 2 TIMES DAILY PRN
Status: DISCONTINUED | OUTPATIENT
Start: 2017-06-04 | End: 2017-06-06 | Stop reason: HOSPADM

## 2017-06-04 RX ORDER — BISACODYL 10 MG
10 SUPPOSITORY, RECTAL RECTAL DAILY PRN
Status: DISCONTINUED | OUTPATIENT
Start: 2017-06-04 | End: 2017-06-06 | Stop reason: HOSPADM

## 2017-06-04 RX ORDER — LEVOFLOXACIN 5 MG/ML
750 INJECTION, SOLUTION INTRAVENOUS EVERY 24 HOURS
Status: DISCONTINUED | OUTPATIENT
Start: 2017-06-05 | End: 2017-06-06

## 2017-06-04 RX ORDER — AMOXICILLIN 250 MG
1-2 CAPSULE ORAL 2 TIMES DAILY
Status: DISCONTINUED | OUTPATIENT
Start: 2017-06-04 | End: 2017-06-06 | Stop reason: HOSPADM

## 2017-06-04 RX ORDER — ONDANSETRON 2 MG/ML
4 INJECTION INTRAMUSCULAR; INTRAVENOUS EVERY 6 HOURS PRN
Status: DISCONTINUED | OUTPATIENT
Start: 2017-06-04 | End: 2017-06-06 | Stop reason: HOSPADM

## 2017-06-04 RX ORDER — ACETAMINOPHEN 325 MG/1
650 TABLET ORAL EVERY 4 HOURS PRN
Status: DISCONTINUED | OUTPATIENT
Start: 2017-06-04 | End: 2017-06-06 | Stop reason: HOSPADM

## 2017-06-04 RX ORDER — DEXTROAMPHETAMINE SACCHARATE, AMPHETAMINE ASPARTATE MONOHYDRATE, DEXTROAMPHETAMINE SULFATE AND AMPHETAMINE SULFATE 3.75; 3.75; 3.75; 3.75 MG/1; MG/1; MG/1; MG/1
30 CAPSULE, EXTENDED RELEASE ORAL DAILY
Status: DISCONTINUED | OUTPATIENT
Start: 2017-06-04 | End: 2017-06-06 | Stop reason: HOSPADM

## 2017-06-04 RX ORDER — BENZONATATE 100 MG/1
100 CAPSULE ORAL 3 TIMES DAILY PRN
Status: DISCONTINUED | OUTPATIENT
Start: 2017-06-04 | End: 2017-06-06 | Stop reason: HOSPADM

## 2017-06-04 RX ORDER — ONDANSETRON 4 MG/1
4 TABLET, ORALLY DISINTEGRATING ORAL EVERY 6 HOURS PRN
Status: DISCONTINUED | OUTPATIENT
Start: 2017-06-04 | End: 2017-06-06 | Stop reason: HOSPADM

## 2017-06-04 RX ORDER — NALOXONE HYDROCHLORIDE 0.4 MG/ML
.1-.4 INJECTION, SOLUTION INTRAMUSCULAR; INTRAVENOUS; SUBCUTANEOUS
Status: DISCONTINUED | OUTPATIENT
Start: 2017-06-04 | End: 2017-06-06 | Stop reason: HOSPADM

## 2017-06-04 RX ORDER — OXYCODONE AND ACETAMINOPHEN 5; 325 MG/1; MG/1
1-2 TABLET ORAL EVERY 4 HOURS PRN
Status: DISCONTINUED | OUTPATIENT
Start: 2017-06-04 | End: 2017-06-06 | Stop reason: HOSPADM

## 2017-06-04 RX ORDER — LIDOCAINE 40 MG/G
CREAM TOPICAL
Status: DISCONTINUED | OUTPATIENT
Start: 2017-06-04 | End: 2017-06-06 | Stop reason: HOSPADM

## 2017-06-04 RX ORDER — SODIUM CHLORIDE AND POTASSIUM CHLORIDE 150; 900 MG/100ML; MG/100ML
INJECTION, SOLUTION INTRAVENOUS CONTINUOUS
Status: DISCONTINUED | OUTPATIENT
Start: 2017-06-04 | End: 2017-06-04

## 2017-06-04 RX ADMIN — BENZONATATE 100 MG: 100 CAPSULE ORAL at 13:48

## 2017-06-04 RX ADMIN — VANCOMYCIN HYDROCHLORIDE 1250 MG: 5 INJECTION, POWDER, LYOPHILIZED, FOR SOLUTION INTRAVENOUS at 19:25

## 2017-06-04 RX ADMIN — BENZONATATE 100 MG: 100 CAPSULE ORAL at 02:24

## 2017-06-04 RX ADMIN — BENZONATATE 100 MG: 100 CAPSULE ORAL at 21:00

## 2017-06-04 RX ADMIN — PIPERACILLIN AND TAZOBACTAM 4.5 G: 4; .5 INJECTION, POWDER, FOR SOLUTION INTRAVENOUS at 18:12

## 2017-06-04 RX ADMIN — OXYCODONE HYDROCHLORIDE AND ACETAMINOPHEN 2 TABLET: 5; 325 TABLET ORAL at 13:46

## 2017-06-04 RX ADMIN — OXYCODONE HYDROCHLORIDE AND ACETAMINOPHEN 2 TABLET: 5; 325 TABLET ORAL at 22:15

## 2017-06-04 RX ADMIN — ACETAMINOPHEN 650 MG: 325 TABLET, FILM COATED ORAL at 02:24

## 2017-06-04 RX ADMIN — OXYCODONE HYDROCHLORIDE AND ACETAMINOPHEN 2 TABLET: 5; 325 TABLET ORAL at 09:43

## 2017-06-04 RX ADMIN — OXYCODONE HYDROCHLORIDE AND ACETAMINOPHEN 2 TABLET: 5; 325 TABLET ORAL at 05:01

## 2017-06-04 RX ADMIN — VANCOMYCIN HYDROCHLORIDE 1250 MG: 5 INJECTION, POWDER, LYOPHILIZED, FOR SOLUTION INTRAVENOUS at 09:52

## 2017-06-04 RX ADMIN — SENNOSIDES AND DOCUSATE SODIUM 2 TABLET: 8.6; 5 TABLET ORAL at 22:16

## 2017-06-04 RX ADMIN — PANTOPRAZOLE SODIUM 40 MG: 40 TABLET, DELAYED RELEASE ORAL at 07:03

## 2017-06-04 RX ADMIN — VANCOMYCIN HYDROCHLORIDE 2000 MG: 1 INJECTION, POWDER, LYOPHILIZED, FOR SOLUTION INTRAVENOUS at 02:04

## 2017-06-04 RX ADMIN — SODIUM CHLORIDE: 9 INJECTION, SOLUTION INTRAVENOUS at 09:50

## 2017-06-04 RX ADMIN — KETOROLAC TROMETHAMINE 30 MG: 30 INJECTION, SOLUTION INTRAMUSCULAR at 08:17

## 2017-06-04 RX ADMIN — SODIUM CHLORIDE: 9 INJECTION, SOLUTION INTRAVENOUS at 22:15

## 2017-06-04 RX ADMIN — PIPERACILLIN AND TAZOBACTAM 4.5 G: 4; .5 INJECTION, POWDER, FOR SOLUTION INTRAVENOUS at 07:03

## 2017-06-04 RX ADMIN — KETOROLAC TROMETHAMINE 30 MG: 30 INJECTION, SOLUTION INTRAMUSCULAR at 21:00

## 2017-06-04 RX ADMIN — SENNOSIDES AND DOCUSATE SODIUM 1 TABLET: 8.6; 5 TABLET ORAL at 09:43

## 2017-06-04 RX ADMIN — PIPERACILLIN AND TAZOBACTAM 4.5 G: 4; .5 INJECTION, POWDER, FOR SOLUTION INTRAVENOUS at 12:22

## 2017-06-04 RX ADMIN — LEVOFLOXACIN 750 MG: 5 INJECTION, SOLUTION INTRAVENOUS at 00:20

## 2017-06-04 RX ADMIN — OXYCODONE HYDROCHLORIDE AND ACETAMINOPHEN 2 TABLET: 5; 325 TABLET ORAL at 18:12

## 2017-06-04 RX ADMIN — ESCITALOPRAM 10 MG: 10 TABLET, FILM COATED ORAL at 09:43

## 2017-06-04 RX ADMIN — PANTOPRAZOLE SODIUM 40 MG: 40 TABLET, DELAYED RELEASE ORAL at 15:56

## 2017-06-04 ASSESSMENT — ACTIVITIES OF DAILY LIVING (ADL)
RETIRED_COMMUNICATION: 0-->UNDERSTANDS/COMMUNICATES WITHOUT DIFFICULTY
RETIRED_EATING: 0-->INDEPENDENT
SWALLOWING: 0-->SWALLOWS FOODS/LIQUIDS WITHOUT DIFFICULTY
SWALLOWING: 0-->SWALLOWS FOODS/LIQUIDS WITHOUT DIFFICULTY
TRANSFERRING: 0-->INDEPENDENT
BATHING: 0-->INDEPENDENT
CHANGE_IN_FUNCTIONAL_STATUS_SINCE_ONSET_OF_CURRENT_ILLNESS/INJURY: NO
AMBULATION: 0-->INDEPENDENT
EATING: 0-->INDEPENDENT
DRESS: 0-->INDEPENDENT
BATHING: 0-->INDEPENDENT
COMMUNICATION: 0-->UNDERSTANDS/COMMUNICATES WITHOUT DIFFICULTY
AMBULATION: 0-->INDEPENDENT
TOILETING: 0-->INDEPENDENT
TOILETING: 0-->INDEPENDENT
DRESS: 0-->INDEPENDENT
FALL_HISTORY_WITHIN_LAST_SIX_MONTHS: NO
TRANSFERRING: 0-->INDEPENDENT
COGNITION: 0 - NO COGNITION ISSUES REPORTED

## 2017-06-04 ASSESSMENT — ENCOUNTER SYMPTOMS
VOMITING: 0
FREQUENCY: 0
DYSURIA: 0
BLOOD IN STOOL: 0
DIARRHEA: 0
HEMATURIA: 0
NAUSEA: 0

## 2017-06-04 NOTE — PROVIDER NOTIFICATION
MD Notification    Notified Person:  MD    Notified Persons Name: Vikram    Notification Date/Time: 6/4/17 4:46am    Notification Interaction:  Talked with Physician    Purpose of Notification: Pt. requesting a stronger pain medication; Tylenol not helpful: was on Oxycodone prior to hospitalization    Orders Received:    Comments:  MD ordered Percocet

## 2017-06-04 NOTE — PROGRESS NOTES
hospitalist brief update note:    Admitted last night. Admission H&P and discharge summary from [facility] reviewed.  Cough (+), was dyspneic and tachypneic as she walked to bed from bathroom and took several minutes to recover.  Rt Lower chest pain, worse with cough.    Tmax 100.3  Oral mucosa moist.  Lungs: diminished breath sound Rt base, on room air but dyspneic.  CVS s1s2 normal, no murmu,r no tachycardia.  Skin Tattoos present    Leucocytosis, slightly better this am.  CT chest report reviewed from admission    Agree with broad spectrum abx started on admission, sputum collected, will be sent for GS/culture, follow blood culture and temp and narrow abx over the nest 1-2 days.  Pending Rt thoracentesis, will have pulmonary see her tomorrow based on thoracentesis findings.  PTA medications for stable medical conditions resumed.    Discussed with patient and RN     Barbra Griffin MD  Hospitalist

## 2017-06-04 NOTE — PLAN OF CARE
Problem: Goal Outcome Summary  Goal: Goal Outcome Summary  Outcome: No Change  A&O x4. VSS on RA. LS clear. Frequent non-productive cough; PRN Tessalon helpful. C/o pain to right side chest area; Tylenol not helpful, PRN Percocet helpful. Continues on IV antibiotics and NS. Up independently.

## 2017-06-04 NOTE — PLAN OF CARE
Problem: Goal Outcome Summary  Goal: Goal Outcome Summary  Outcome: No Change  Continues with mildly productive cough, shortness of breath with activity and lateral right back pain.  Tesselon given x 1, percocet x 2 this shift; using incentive spirometer with some effort.  .  Afebrile, up ad kathya in room.  Continues on IV abx.  Fair intake. Thoracentesis tomorrow.  Sputum specimen sent this am to lab.

## 2017-06-04 NOTE — ED NOTES
[facility]  ED Nurse Handoff Report    ED Chief complaint: No chief complaint on file.      ED Diagnosis:   Final diagnoses:   None       Code Status: Full Code    Allergies: No Known Allergies    Activity level - Baseline/Home:  Independent    Activity Level - Current:   Independent     Needed?: No    Isolation: No  Infection: Not Applicable    Bariatric?: No    Vital Signs:   Vitals:    06/03/17 2100 06/03/17 2159 06/03/17 2200 06/03/17 2201   BP: 110/58 123/62     Pulse:       Resp: (!) 37 (!) 32 (!) 42 (!) 41   Temp:       TempSrc:       SpO2: 95%  96% 97%       Cardiac Rhythm: ,   Cardiac  Cardiac Rhythm: Normal sinus rhythm    Pain level: 0-10 Pain Scale: 7    Is this patient confused?: No    Patient Report: Initial Complaint:  evaluation of chest and shortness of breath.  She states that she was admitted to [facility] on 5/23 where they performed a CT/MRI and discharged 5/30 with oxycodone, which she states helps but she will be out soon.  She states that cough with phlegm began on 5/27.   She saw her primary care doctor 3 days ago.  The patient also states that lower right abdominal pain that radiates to her back has moved upwards.  She states loss of appetite, nausea and vomitting and denies being around others who have been sick lately.  Actively coughing in room.  Focused Assessment: chest pain/shortness of breath/back pain  Tests Performed: see epic  Abnormal Results: see epic  Treatments provided: dilaudid 0.5mg x3, toradol 30mg x1, bolus of saline, 1 gm tylenol, zosyn, and currently running levoflaxacin.  Will need vanco, and RN will send vanco up with patient.      Family Comments: mother here at bedside    OBS brochure/video discussed/provided to patient: N/A    ED Medications:   Medications   0.9% sodium chloride infusion (not administered)   0.9% sodium chloride BOLUS (2,913 mLs Intravenous New Bag 6/3/17 1946)   HYDROmorphone (PF) (DILAUDID) injection 0.5 mg (0.5 mg  Intravenous Given 6/3/17 2116)   iopamidol (ISOVUE-370) solution 76 mL (76 mLs Intravenous Given 6/3/17 2145)   sodium chloride 0.9 % for CT scan flush dose 99 mL (99 mLs Intravenous Given 6/3/17 2146)   ketorolac (TORADOL) injection 30 mg (30 mg Intravenous Given 6/3/17 2208)       Drips infusing?:  No      ED NURSE PHONE NUMBER: [phone]

## 2017-06-04 NOTE — H&P
DATE OF SERVICE:  06/03/2017      PRIMARY CARE PHYSICIAN:  Isidra Fernandez MD.      CHIEF COMPLAINT:  Chest pain, shortness of breath.      HISTORY OF PRESENT ILLNESS:  Dahlia Hayes is a 26-year-old female with ADHD, anxiety, fibrocystic changes, overall healthy, who presents to [facility] with the above complaints.      The patient initially had some right-sided abdominal pain.  She went in to [facility].  The pain was severe enough that they were concerned for appendicitis.  She was admitted from 05/25 through 05/30.  She had extensive workup including CT scan, MRI and ultrasound.  She also had leukocytosis of up to 37,000.  The workup was overall negative.  While she was in the hospital, she started developing a cough.  She was discharged when her workup was negative and the patient was to follow with outpatient endoscopic studies.  The patient over the last couple days has been having right-sided pleuritic pain with cough.  She is splinting.  She denies any fevers, chills.  The patient a couple days ago underwent EGD which was negative as a part of her workup for abdominal pain.  She is scheduled to undergo colonoscopy next week.  Due to ongoing pain and pleuritic in nature and cough the patient came to [facility] for further assessment.      In the emergency room, the patient was seen by Dr. Patricia Ludwig.  The patient had a temperature of 100.3.  She was borderline tachycardic.  Blood pressures were reasonable.  Blood work revealed normal basic metabolic panel, normal kidney function and LFTs are all unremarkable with the exception of slight elevation of ALT of 56 and albumin of 2.6.  CRP is elevated at 150.  Lactic acid 1.4.  Glucose 111.  White count 13,900 with a left shift with absolute neutrophil count 10,900, hemoglobin 11.6, platelets 447.  D-dimer is elevated at 3.8.  She had blood cultures obtained and she had a CT of the chest done, which showed no pulmonary  embolism or thoracic aortic abnormality.  There is a small to moderate right pleural effusion and a prominent right base atelectasis.  Airspace disease in the right chest could not be entirely excluded.  The patient is being admitted for possible hospital-acquired pneumonia.      PAST MEDICAL HISTORY:   1.  ADHD.   2.  Anxiety.   3.  Depression.   4.  History of fibrocystic changes.      PAST SURGICAL HISTORY:     1.  Tonsillectomy.   2.  Tooth extraction.      ALLERGIES:  No known drug allergies.      CURRENT MEDICATIONS:   1.  Benzonatate 100 mg 3 times a day.   2.  Oxycodone 5 mg q.4-6 h. as needed.   3.  Adderall 30 mg once daily.   4.  Lexapro 10 mg once a day.      FAMILY HISTORY:  Mother with anxiety, depression and thyroid disease.      SOCIAL HISTORY:  Single.  She works at the  of a [employer] shop.  She smokes marijuana about once a month.  No other tobacco or alcohol.      REVIEW OF SYSTEMS:  A 10-point review of systems reviewed and positive for poor appetite. fever, cough, shortness of breath and chest pain.  She also has some occasional right lower abdominal discomfort, nausea and vomiting.  Denies any rash, denies any diarrhea.  Otherwise 10 points reviewed.      PHYSICAL EXAMINATION:   VITAL SIGNS:  Temperature 100.3, heart rate 104, respirations 16, blood pressure 115/62, sats 93% on room air.   GENERAL:  The patient is a 26-year-old obese  female.  She is oriented x3, no distress.   HEENT:  Unremarkable.   NECK:  Veins are distended.   LUNGS:  She has diminished breath sounds at the right lung base.   CARDIOVASCULAR:  S1, S2, regular rate and rhythm.  No murmurs, gallops or rubs noted.   ABDOMEN:  There is no guarding or rebound.  She has some mild discomfort with right upper quadrant palpation.   EXTREMITIES:  No clubbing, cyanosis or edema.   NEUROLOGIC:  The patient is grossly nonfocal.      LABORATORY DATA:  As dictated in the History of Present Illness.      ASSESSMENT:  Ms.  Freddy is a 26-year-old with progressive right-sided chest and abdominal discomfort with a finding of a moderate pleural effusion and leukocytosis, being admitted for further treatment being currently diagnosed as hospital-acquired pneumonia.      PLAN:   1.  Right-sided pleuritic pain with moderate pleural effusion suspected pneumonia with parapneumonic effusion.  The patient will be admitted.  She will be treated with vancomycin, Zosyn and ceftriaxone.  We will obtain ultrasound-guided thoracentesis on the right and we will send that for Gram stain, culture and chemistries.   2.  ADHD.  We will continue the patient on her Adderall and Lexapro.   3.  Cough, right-sided chest pain.  This is likely quite inflammatory with elevated CRP level.  We will continue the patient on her Benzonatate and oxycodone.   4.  Deep venous thrombosis prophylaxis with compression boots during inpatient admission.         ESTELA MEYERS MD             D: 2017 02:24   T: 2017 08:37   MT:       Name:     SHREYAS BERKOWITZ   MRN:      [id number]        Account:      [id number]   :      1990           Admitted:     [id number]      Document: [id number]       cc: Isidra DENG

## 2017-06-04 NOTE — ED PROVIDER NOTES
History     Chief Complaint:  Chest Pain and Shortness of Breath    HPI   Dahlia Hayes is a 26 year old female who presents to the emergency department for evaluation of chest pain and shortness of breath.  The patient reports she was admitted at [facility] 5/25 - 5/30 for lower abdominal pain radiating to the back.  Review of Care Everywhere shows she had also presented with bloody diarrhea and fever and had a white count of 34.6.  She had a CT abdomen/pelvis that suggested possible inflammatory bowel process.  MRI and RUQ ultrasound showed no definitive gallbladder disease.  She was subsequently discharged without definitive cause of her symptoms found and recommended to see GI to have a colonoscopy and upper GI.  Since that time her pain has moved more to her right shoulder and back.  She was seen in clinic and her primary physician suggested this pain might be referred from her abdomen.  Pain worsens with deep breathing and she finds it difficult to lie flat due to pain and feeling like she cannot breathe.  She has had a cough for about a week now with sputum production.  She has had little appetite but denies any nausea, vomiting, or continued diarrhea.  She has no history of blood clots.  She did actually have an endoscopy yesterday which she states was normal.  She is supposed to have a colonoscopy next week.  Oxycodone does help with the pain however she only has 1 pill left.      PE/DVT Risk Factors:  The patient no any personal or familial history of PE, DVT or clotting disorder.  She was recently hospitalized.  She reports no recent travel or surgery.  She is not on estrogen hormone therapy, is not a smoker, and has no known malignancy.     Allergies:  NKDA    Medications:    Benzonatate  Oxycodone  Adderall  Lexapro    Past Medical History:    ADHD  Anxiety  Breast Lumps  Chronic Tonsillitis    Past Surgical History:    Head and Neck Surgery - [facility]  Teeth  Tonsillectomy    Family History:    Mother: Anxiety, mental illness, depression, thyroid disease    Social History:  The patient denies tobacco or alcohol use.  Marital Status:  Single [1]    Review of Systems   Constitutional: Positive for appetite change and fever.   Respiratory: Positive for cough and shortness of breath.    Cardiovascular: Positive for chest pain.   Gastrointestinal: Positive for abdominal pain. Negative for blood in stool, diarrhea, nausea and vomiting.   Genitourinary: Negative for dysuria, frequency and hematuria.   All other systems reviewed and are negative.    Physical Exam     Patient Vitals for the past 24 hrs:   BP Temp Temp src Pulse Heart Rate Resp SpO2   06/04/17 0000 110/51 - - - 71 11 95 %   06/03/17 2341 - - - - 68 - 96 %   06/03/17 2340 112/47 - - - 79 20 -   06/03/17 2321 - - - - - 27 -   06/03/17 2320 99/58 - - - 76 - 95 %   06/03/17 2300 101/48 - - - 74 22 95 %   06/03/17 2220 119/68 - - - 81 12 95 %   06/03/17 2201 - - - - 81 (!) 41 97 %   06/03/17 2200 - - - - 77 (!) 42 96 %   06/03/17 2159 123/62 - - - 81 (!) 32 -   06/03/17 2100 110/58 - - - 90 (!) 37 95 %   06/03/17 2051 - - - - 87 (!) 38 97 %   06/03/17 2025 - - - - 94 26 98 %   06/03/17 2020 95/63 - - - 91 (!) 39 96 %   06/03/17 2008 - - - - 92 (!) 32 97 %   06/03/17 2000 106/59 - - - 96 30 96 %   06/03/17 1956 - - - - 91 27 96 %   06/03/17 1955 - - - - 98 24 95 %   06/03/17 1954 - - - - 96 23 95 %   06/03/17 1953 - - - - - - 94 %   06/03/17 1952 113/59 - - - - - -   06/03/17 1951 112/71 - - - - - -   06/03/17 1910 160/88 100.3  F (37.9  C) Oral 104 - 20 99 %      Physical Exam  Nursing note and vitals reviewed.  Constitutional:  Appeared slightly dyspneic with talking.  Appears well-developed and well-nourished.   HENT:   Head:   No evidence of facial or scalp trauma.  Nose:    Nose normal.   Mouth/Throat:  Mucosa is moist.  Eyes:    Conjunctivae are normal.      Pupils are equal, round, and reactive to light.       Right eye exhibits no discharge. Left eye exhibits no discharge.      No scleral icterus.   Cardiovascular:  Normal rate, regular rhythm.      Normal heart sounds and intact distal pulses.       No murmur heard.  Pulmonary/Chest:  Wet sounding cough.     Lungs are clear.     Breath sounds normal. No respiratory distress.     No wheezes. No rales. No chest wall tenderness. No stridor.   Abdominal:   Soft. No distension and no mass. No tenderness.      No rebound and no guarding. No flank pain.  Musculoskeletal:  Tenderness to right chest wall and abdominal upper right quadrant     No calf tenderness.     Normal range of motion.      No edema and no tenderness.                                       Neck supple, no midline cervical tenderness.   Neurological:   Alert and oriented to person, place, and year.      No cranial nerve deficit.      Exhibits normal muscle tone. Coordination normal.      GCS eye subscore is 4. GCS verbal subscore is 5.      GCS motor subscore is 6.   Skin:    Skin is warm and dry. No rash noted. No diaphoresis.      No erythema. No pallor.   Psychiatric:   Behavior is normal. Judgment and thought content normal.     Emergency Department Course   ECG:  Indication: Chest pain and shortness of breath  Time: 1942  Vent. Rate 96 bpm. IN interval 140. QRS duration 80. QT/QTc 332/419. P-R-T axis 65 49 -6. Normal sinus rhythm. Possible left atrial enlargement. Nonspecific T wave abnormality. No significant change compared to EKG dated 6/2/17. Abnormal ECG.   Read time: 1946    Date: 6/2/17   Vent. Rate 104 bpm. IN interval 144. QRS duration 80. QT/QTc 332/436. P-R-T axis 50 7 -4. Sinus tachycardia. Possible left atrial enlargement. Borderline ECG.    Imaging:  Chest CT, IV Contrast Only - PE Protocol  1. No pulmonary embolism or acute thoracic aortic abnormality.  2. Small to moderate right pleural effusion. Prominent right base atelectasis.  3. Airspace disease at the right chest base not  entirely excluded.  As per radiology.    Laboratory:  Blood Culture: In process x 2   CBC: WBC: 13.9 (H), HGB: 11.6 (L), PLT: 447  CMP: Glucose 111 (H), BUN 4 (L) Creatinine 0.51 (L), Albumin 2.6 (L), ALT 56 (H) o/w WNL  Lactic Acid Whole Blood: 1.4  D dimer: 3.8 (H)  CRP inflammation: 150.0 (H)  Erythrocyte Sedimentation Rate: 70 (H)     Interventions:  1947 Dilaudid 0.5 mg IV  2031 Dilaudid 0.5 mg IV  2116 Dilaudid 0.5 mg IV  2208 Toradol 30 mg IV  2327 Zosyn 4.5 g IV  2336 Tylenol 1,000 mg PO  0020 Levaquin 750 mg IV  Vancomycin 2000 mg IV - ordered, to be given on floor after Levaquin    Emergency Department Course:  Nursing notes and vitals reviewed.  I performed an exam of the patient as documented above.  EKG obtained in the ED, see results above.   Blood drawn. This was sent to the lab for further testing, results above.  The patient was sent for a chest CT while in the emergency department, findings above.   1934 I reevaluated the patient and provided an update in regards to her ED course.    2341 I consulted with Dr. Sue about admittance.  Findings and plan explained to the Patient who consents to admission. Discussed the patient with Dr. Sue, who will admit the patient to a inpatient medical bed for further monitoring, evaluation, and treatment.    Impression & Plan      Medical Decision Making:  Dahlia Hayes is a 26 year old female who comes in with having had big workup for abdominal and chest pain.  She has had a cough for the last week.  She has pain when she tries to take a deep breath.  She is feeling more short of breath.  She does not really have a reason to have a PE but they had a CT her abdomen pelvis and that was negative.  Here her white count is elevated 13.9.  She did appear somewhat short of breath and definitely having pain with deep breathing on the right side.  Her lactic acid is normal.  Her comprehensive panel is normal other than a slightly elevated ALT of 56.  However,  her CRP is markedly elevated at 150, erythrocyte rate is up at 70.  I did get a D dimer and it is slightly elevated at 3.8.  Blood cultures have been obtained and her chest CT was obtained.  She has no pulmonary embolism or thoracic problem.  However, she has a moderate right pleural effusion with some atelectasis in the right base.  They could not rule out pneumonia or infiltrate.  I think with her fever that she spike intermittently and the white count that this is probably pneumonia with a parapneumonic effusion.  She is sick enough that I feel she needs to come into the hospital.  She is required IV narcotics for pain.  I have started health care associated pneumonia antibiotics with Zosyn, Levaquin, and Vancomycin.  I will discuss the case with the hospital to admit her onto a medical bed for further management.       Diagnosis:    ICD-10-CM    1. Pneumonia of right lower lobe due to infectious organism J18.9    2. Pleural effusion J90    3. Pleuritic chest pain R07.81        Disposition:  Admitted to Dr. Sue.  IV antibiotics, Pain management.  May need to consider a thoracentesis to get a sample of this fluid but it does appear to be most likely inflammatory.  Empyema 100% can be ruled out.      I, Michel Torres, am serving as a scribe on 6/3/2017 at 7:11 PM to personally document services performed by Patricia Ludwig MD based on my observations and the provider's statements to me.     6/3/2017    EMERGENCY DEPARTMENT       Patricia Ludwig MD  06/04/17 1530

## 2017-06-04 NOTE — PHARMACY-VANCOMYCIN DOSING SERVICE
Pharmacy Vancomycin Initial Note  Date of Service 2017  Patient's  1990  26 year old, female    Indication: Healthcare-Associated Pneumonia    Current estimated CrCl = Estimated Creatinine Clearance: 188.8 mL/min (based on Cr of 0.51).    Creatinine for last 3 days  2017: 12:21 AM Creatinine 0.67 mg/dL  6/3/2017:  7:45 PM Creatinine 0.51 mg/dL    Recent Vancomycin Level(s) for last 3 days  No results found for requested labs within last 72 hours.      Vancomycin IV Administrations (past 72 hours)                   vancomycin (VANCOCIN) 2,000 mg in NaCl 0.9 % 500 mL intermittent infusion (mg) 2,000 mg New Bag 17 0204                Nephrotoxins and other renal medications (Future)    Start     Dose/Rate Route Frequency Ordered Stop    17 1000  vancomycin 1250 mg in 0.9% NaCl 250 mL PREMIX      1,250 mg Intravenous EVERY 8 HOURS 17 0241      17 0600  piperacillin-tazobactam (ZOSYN) 4.5 g vial to attach to  mL bag      4.5 g  over 1 Hours Intravenous EVERY 6 HOURS 17 0214            Contrast Orders - past 72 hours (72h ago through future)    Start     Dose/Rate Route Frequency Ordered Stop    17 2135  iopamidol (ISOVUE-370) solution 76 mL      76 mL Intravenous ONCE 17 2145                Plan:  1.  Start vancomycin  1250 mg IV q8h.   2.  Goal Trough Level: 15-20 mg/L   3.  Pharmacy will check trough levels as appropriate in 1-3 Days.    4. Serum creatinine levels will be ordered daily for the first week of therapy and at least twice weekly for subsequent weeks.    5. Harvard method utilized to dose vancomycin therapy: Method 1    Donato Choe

## 2017-06-04 NOTE — PHARMACY-ADMISSION MEDICATION HISTORY
Admission medication history interview status for the 6/3/2017  admission is complete. See EPIC admission navigator for prior to admission medications     Medication history source reliability:Good    Actions taken by pharmacist (provider contacted, etc):None     Additional medication history information not noted on PTA med list :None    Medication reconciliation/reorder completed by provider prior to medication history? No    Time spent in this activity: 30 min     Prior to Admission medications    Medication Sig Last Dose Taking? Auth Provider   BENZONATATE PO Take 100 mg by mouth 3 times daily as needed for cough  at prn  Yes Reported, Patient   OXYCODONE HCL PO Take 5 mg by mouth every 4 hours as needed 6/3/2017 at Unknown time Yes Reported, Patient   PANTOPRAZOLE SODIUM PO Take 40 mg by mouth 2 times daily (before meals) 6/3/2017 at Unknown time Yes Reported, Patient   amphetamine-dextroamphetamine (ADDERALL XR) 10 MG per capsule 10 mg daily  Past Week at Unknown time Yes Reported, Patient   amphetamine-dextroamphetamine (ADDERALL XR) 30 MG per capsule Take 30 mg by mouth daily  Past Week at Unknown time Yes Reported, Patient   escitalopram (LEXAPRO) 10 MG tablet Take 10 mg by mouth daily  6/3/2017 at Unknown time Yes Reported, Patient

## 2017-06-04 NOTE — PROVIDER NOTIFICATION
MD Notification    Notified Person:  MD    Notified Persons Name: Vikram    Notification Date/Time: 6/4/17 0140am    Notification Interaction:  Talked with Physician    Purpose of Notification:New admit : Pt. C/o pain/No pain med order     Orders Received:    Comments:  MD ordered Tylenol

## 2017-06-05 ENCOUNTER — APPOINTMENT (OUTPATIENT)
Dept: GENERAL RADIOLOGY | Facility: CLINIC | Age: 27
DRG: 194 | End: 2017-06-05
Attending: RADIOLOGY

## 2017-06-05 ENCOUNTER — APPOINTMENT (OUTPATIENT)
Dept: ULTRASOUND IMAGING | Facility: CLINIC | Age: 27
DRG: 194 | End: 2017-06-05
Attending: INTERNAL MEDICINE

## 2017-06-05 LAB
ANION GAP SERPL CALCULATED.3IONS-SCNC: 8 MMOL/L (ref 3–14)
APPEARANCE FLD: NORMAL
BASOPHILS # BLD AUTO: 0 10E9/L (ref 0–0.2)
BASOPHILS NFR BLD AUTO: 0.1 %
BUN SERPL-MCNC: 5 MG/DL (ref 7–30)
CALCIUM SERPL-MCNC: 8.6 MG/DL (ref 8.5–10.1)
CHLORIDE SERPL-SCNC: 105 MMOL/L (ref 94–109)
CO2 SERPL-SCNC: 26 MMOL/L (ref 20–32)
COLOR FLD: NORMAL
CREAT SERPL-MCNC: 0.52 MG/DL (ref 0.52–1.04)
DIFFERENTIAL METHOD BLD: ABNORMAL
EOSINOPHIL # BLD AUTO: 0.1 10E9/L (ref 0–0.7)
EOSINOPHIL NFR BLD AUTO: 1.8 %
ERYTHROCYTE [DISTWIDTH] IN BLOOD BY AUTOMATED COUNT: 13.9 % (ref 10–15)
GFR SERPL CREATININE-BSD FRML MDRD: ABNORMAL ML/MIN/1.7M2
GLUCOSE FLD-MCNC: 90 MG/DL
GLUCOSE SERPL-MCNC: 103 MG/DL (ref 70–99)
GRAM STN SPEC: NORMAL
HCT VFR BLD AUTO: 30.9 % (ref 35–47)
HGB BLD-MCNC: 10.3 G/DL (ref 11.7–15.7)
IMM GRANULOCYTES # BLD: 0.1 10E9/L (ref 0–0.4)
IMM GRANULOCYTES NFR BLD: 0.6 %
LDH FLD L TO P-CCNC: 346 U/L
LYMPHOCYTES # BLD AUTO: 1.6 10E9/L (ref 0.8–5.3)
LYMPHOCYTES NFR BLD AUTO: 20.4 %
LYMPHOCYTES NFR FLD MANUAL: 46 %
MCH RBC QN AUTO: 28.5 PG (ref 26.5–33)
MCHC RBC AUTO-ENTMCNC: 33.3 G/DL (ref 31.5–36.5)
MCV RBC AUTO: 85 FL (ref 78–100)
MICRO REPORT STATUS: NORMAL
MONOCYTES # BLD AUTO: 0.6 10E9/L (ref 0–1.3)
MONOCYTES NFR BLD AUTO: 6.9 %
MONOS+MACROS NFR FLD MANUAL: 17 %
NEUTROPHILS # BLD AUTO: 5.6 10E9/L (ref 1.6–8.3)
NEUTROPHILS NFR BLD AUTO: 70.2 %
NEUTS BAND NFR FLD MANUAL: 37 %
NRBC # BLD AUTO: 0 10*3/UL
NRBC BLD AUTO-RTO: 0 /100
PH FLD: 8 PH
PLATELET # BLD AUTO: 451 10E9/L (ref 150–450)
POTASSIUM SERPL-SCNC: 3.8 MMOL/L (ref 3.4–5.3)
PROCALCITONIN SERPL-MCNC: NORMAL NG/ML
PROT FLD-MCNC: 3.6 G/DL
RBC # BLD AUTO: 3.62 10E12/L (ref 3.8–5.2)
SODIUM SERPL-SCNC: 139 MMOL/L (ref 133–144)
SPECIMEN SOURCE FLD: NORMAL
SPECIMEN SOURCE: NORMAL
VANCOMYCIN SERPL-MCNC: 13 MG/L
WBC # BLD AUTO: 8 10E9/L (ref 4–11)
WBC # FLD AUTO: NORMAL /UL

## 2017-06-05 PROCEDURE — 32555 ASPIRATE PLEURA W/ IMAGING: CPT

## 2017-06-05 PROCEDURE — 36415 COLL VENOUS BLD VENIPUNCTURE: CPT | Performed by: INTERNAL MEDICINE

## 2017-06-05 PROCEDURE — 85025 COMPLETE CBC W/AUTO DIFF WBC: CPT | Performed by: HOSPITALIST

## 2017-06-05 PROCEDURE — 82945 GLUCOSE OTHER FLUID: CPT | Performed by: INTERNAL MEDICINE

## 2017-06-05 PROCEDURE — 25000132 ZZH RX MED GY IP 250 OP 250 PS 637: Performed by: INTERNAL MEDICINE

## 2017-06-05 PROCEDURE — 84157 ASSAY OF PROTEIN OTHER: CPT | Performed by: INTERNAL MEDICINE

## 2017-06-05 PROCEDURE — 83615 LACTATE (LD) (LDH) ENZYME: CPT | Performed by: INTERNAL MEDICINE

## 2017-06-05 PROCEDURE — 0W993ZZ DRAINAGE OF RIGHT PLEURAL CAVITY, PERCUTANEOUS APPROACH: ICD-10-PCS | Performed by: RADIOLOGY

## 2017-06-05 PROCEDURE — 80202 ASSAY OF VANCOMYCIN: CPT | Performed by: INTERNAL MEDICINE

## 2017-06-05 PROCEDURE — 87205 SMEAR GRAM STAIN: CPT | Performed by: INTERNAL MEDICINE

## 2017-06-05 PROCEDURE — 36415 COLL VENOUS BLD VENIPUNCTURE: CPT | Performed by: HOSPITALIST

## 2017-06-05 PROCEDURE — 25000128 H RX IP 250 OP 636: Performed by: INTERNAL MEDICINE

## 2017-06-05 PROCEDURE — 89051 BODY FLUID CELL COUNT: CPT | Performed by: INTERNAL MEDICINE

## 2017-06-05 PROCEDURE — 83986 ASSAY PH BODY FLUID NOS: CPT | Performed by: INTERNAL MEDICINE

## 2017-06-05 PROCEDURE — 99232 SBSQ HOSP IP/OBS MODERATE 35: CPT | Performed by: HOSPITALIST

## 2017-06-05 PROCEDURE — 84145 PROCALCITONIN (PCT): CPT | Performed by: HOSPITALIST

## 2017-06-05 PROCEDURE — 40000986 XR CHEST 1 VW

## 2017-06-05 PROCEDURE — 80048 BASIC METABOLIC PNL TOTAL CA: CPT | Performed by: HOSPITALIST

## 2017-06-05 PROCEDURE — 12000000 ZZH R&B MED SURG/OB

## 2017-06-05 PROCEDURE — 25000128 H RX IP 250 OP 636: Performed by: EMERGENCY MEDICINE

## 2017-06-05 RX ORDER — LIDOCAINE HYDROCHLORIDE 10 MG/ML
9 INJECTION, SOLUTION EPIDURAL; INFILTRATION; INTRACAUDAL; PERINEURAL ONCE
Status: COMPLETED | OUTPATIENT
Start: 2017-06-05 | End: 2017-06-05

## 2017-06-05 RX ADMIN — OXYCODONE HYDROCHLORIDE AND ACETAMINOPHEN 1 TABLET: 5; 325 TABLET ORAL at 14:05

## 2017-06-05 RX ADMIN — OXYCODONE HYDROCHLORIDE AND ACETAMINOPHEN 2 TABLET: 5; 325 TABLET ORAL at 23:41

## 2017-06-05 RX ADMIN — PIPERACILLIN AND TAZOBACTAM 4.5 G: 4; .5 INJECTION, POWDER, FOR SOLUTION INTRAVENOUS at 01:51

## 2017-06-05 RX ADMIN — PANTOPRAZOLE SODIUM 40 MG: 40 TABLET, DELAYED RELEASE ORAL at 16:39

## 2017-06-05 RX ADMIN — KETOROLAC TROMETHAMINE 30 MG: 30 INJECTION, SOLUTION INTRAMUSCULAR at 21:28

## 2017-06-05 RX ADMIN — VANCOMYCIN HYDROCHLORIDE 1250 MG: 5 INJECTION, POWDER, LYOPHILIZED, FOR SOLUTION INTRAVENOUS at 12:21

## 2017-06-05 RX ADMIN — LEVOFLOXACIN 750 MG: 5 INJECTION, SOLUTION INTRAVENOUS at 23:41

## 2017-06-05 RX ADMIN — PANTOPRAZOLE SODIUM 40 MG: 40 TABLET, DELAYED RELEASE ORAL at 06:56

## 2017-06-05 RX ADMIN — VANCOMYCIN HYDROCHLORIDE 1250 MG: 5 INJECTION, POWDER, LYOPHILIZED, FOR SOLUTION INTRAVENOUS at 03:04

## 2017-06-05 RX ADMIN — LEVOFLOXACIN 750 MG: 5 INJECTION, SOLUTION INTRAVENOUS at 00:17

## 2017-06-05 RX ADMIN — KETOROLAC TROMETHAMINE 30 MG: 30 INJECTION, SOLUTION INTRAMUSCULAR at 11:56

## 2017-06-05 RX ADMIN — OXYCODONE HYDROCHLORIDE AND ACETAMINOPHEN 1 TABLET: 5; 325 TABLET ORAL at 19:01

## 2017-06-05 RX ADMIN — SODIUM CHLORIDE: 9 INJECTION, SOLUTION INTRAVENOUS at 23:50

## 2017-06-05 RX ADMIN — OXYCODONE HYDROCHLORIDE AND ACETAMINOPHEN 2 TABLET: 5; 325 TABLET ORAL at 09:28

## 2017-06-05 RX ADMIN — PIPERACILLIN AND TAZOBACTAM 4.5 G: 4; .5 INJECTION, POWDER, FOR SOLUTION INTRAVENOUS at 12:21

## 2017-06-05 RX ADMIN — PIPERACILLIN AND TAZOBACTAM 4.5 G: 4; .5 INJECTION, POWDER, FOR SOLUTION INTRAVENOUS at 17:43

## 2017-06-05 RX ADMIN — PIPERACILLIN AND TAZOBACTAM 4.5 G: 4; .5 INJECTION, POWDER, FOR SOLUTION INTRAVENOUS at 06:56

## 2017-06-05 RX ADMIN — SENNOSIDES AND DOCUSATE SODIUM 1 TABLET: 8.6; 5 TABLET ORAL at 09:28

## 2017-06-05 RX ADMIN — VANCOMYCIN HYDROCHLORIDE 1250 MG: 5 INJECTION, POWDER, LYOPHILIZED, FOR SOLUTION INTRAVENOUS at 19:01

## 2017-06-05 RX ADMIN — LIDOCAINE HYDROCHLORIDE 90 MG: 10 INJECTION, SOLUTION EPIDURAL; INFILTRATION; INTRACAUDAL; PERINEURAL at 08:54

## 2017-06-05 RX ADMIN — SODIUM CHLORIDE: 9 INJECTION, SOLUTION INTRAVENOUS at 10:27

## 2017-06-05 RX ADMIN — OXYCODONE HYDROCHLORIDE AND ACETAMINOPHEN 2 TABLET: 5; 325 TABLET ORAL at 03:12

## 2017-06-05 RX ADMIN — ESCITALOPRAM 10 MG: 10 TABLET, FILM COATED ORAL at 09:28

## 2017-06-05 ASSESSMENT — PAIN DESCRIPTION - DESCRIPTORS
DESCRIPTORS: BURNING
DESCRIPTORS: BURNING

## 2017-06-05 NOTE — CONSULTS
PULMONOLOGY CONSULTATION  Date of service: 6/5/2017    [facility]  _____________________________________________________    Dahlia Hayes  26 year old female  [id number]  [address]  [facility] [address]    Primary Care Provider:  Isidra Fernandez  Admission Date: 6/3/2017  Hospital Attending Physician: Felice Sue MD    ________________________________________    CHIEF COMPLAINT : I was asked to see this patient by Dr. Francesco Mosher for evaluation of chest pain/pleural effusion   Informant: Patient, EMR    HISTORY OF PRESENT ILLNESS     Dahlia Hayes is a 26 year old female with past medical history significant for ADHD, anxiety, fibrocystic changes, overall healthy, who presents to [facility] with chest pain/sob.       The patient initially had some right-sided abdominal pain.  She went in to [facility].  The pain was severe enough that they were concerned for appendicitis.  She was admitted from 05/25 through 05/30.  She had extensive workup including CT scan, MRI and ultrasound.  She also had leukocytosis of up to 37,000.  The workup was overall negative.  While she was in the hospital, she started developing a cough.  She was discharged when her workup was negative and the patient was to follow with outpatient endoscopic studies.  The patient over the last couple days has been having right-sided pleuritic pain with cough. .  She denies any fevers, chills.  The patient a couple days ago underwent EGD which was negative as a part of her workup for abdominal pain.  She is scheduled to undergo colonoscopy next week.  Due to ongoing pain and pleuritic in nature and cough the patient came to [facility] for further assessment.       In the emergency room, the patient was seen by Dr. Patricia Ludwig.  The patient had a temperature of 100.3.  She was borderline tachycardic.  Blood pressures were reasonable.  Blood work revealed normal  basic metabolic panel, normal kidney function and LFTs are all unremarkable with the exception of slight elevation of ALT of 56 and albumin of 2.6.  CRP is elevated at 150.  Lactic acid 1.4.  Glucose 111.  White count 13,900 with a left shift with absolute neutrophil count 10,900, hemoglobin 11.6, platelets 447.  D-dimer is elevated at 3.8.  She had blood cultures obtained and she had a CT of the chest done, which showed no pulmonary embolism or thoracic aortic abnormality.  There is a small to moderate right pleural effusion and a prominent right base atelectasis.  Airspace disease in the right chest could not be entirely excluded.  The patient is being admitted for possible hospital-acquired pneumonia.     She was hospitalized at [facility] for 5 days last week for sever RLQ abd pain, care elsewhere reviewed: leukocytosis, abd ct 5/25 no significant chest findings. She developed cough and cxr 5/29 normal.She had extensive GI workup with no etiology but abd pain improved. Outpatient EGD was normal recently.  5 days ago she developed cough, later severe pleuritic chest pain and presented to [facility] ER.    She underwent US thoracentesis today with removal of 450 ml.      CURRENT RESPIRATORY SYMPTOMS:  Sinus congestion/drainage/pain:  no  Cough: yes  Sputum: sl yellow  Wheeze:  no  Dyspnea: yes  Chest Discomfort: yes, pleuritic, better after thoracentesis today  Paroxysmal nocturnal dyspnea/Orthopnea:  no  Fever/ Chills/ Sweats: not now   Reflux: no  Dysphagia/Vomiting:no    HOME MEDICATIONS   Pulmonary meds: no  Home Oxygen: no  Prescriptions Prior to Admission   Medication Sig Dispense Refill Last Dose     BENZONATATE PO Take 100 mg by mouth 3 times daily as needed for cough    at prn      OXYCODONE HCL PO Take 5 mg by mouth every 4 hours as needed   6/3/2017 at Unknown time     PANTOPRAZOLE SODIUM PO Take 40 mg by mouth 2 times daily (before meals)   6/3/2017 at Unknown time     amphetamine-dextroamphetamine (ADDERALL XR) 10  MG per capsule 10 mg daily    Past Week at Unknown time     amphetamine-dextroamphetamine (ADDERALL XR) 30 MG per capsule Take 30 mg by mouth daily    Past Week at Unknown time     escitalopram (LEXAPRO) 10 MG tablet Take 10 mg by mouth daily    6/3/2017 at Unknown time       PAST MEDICAL HISTORY      Past Medical History:   Diagnosis Date     ADHD (attention deficit hyperactivity disorder)     adderall      Anxiety     lexapro  Dr. Carrillo PN      breast lumps     benign on mammogram      Chronic tonsillitis      Past Surgical History:   Procedure Laterality Date     HEAD & NECK SURGERY      WISDOM TEETH     TONSILLECTOMY ADULT  2012    Procedure:TONSILLECTOMY ADULT; TONSILLECTOMY ; Surgeon:JAMES BURNS Location:[facility]       ALLERGIES   No Known Allergies    SOCIAL / SUBSTANCE HISTORY     Social History     Social History     Marital status: Single     Spouse name: N/A     Number of children: 1     Years of education: N/A     Occupational History     student       nursing school      Social History Main Topics     Smoking status: Never Smoker     Smokeless tobacco: Never Used     Alcohol use No     Drug use: Yes     Special: Marijuana      Comment: once every couple weeks     Sexual activity: Yes     Partners: Male     Birth control/ protection: Natural Family Planning     Other Topics Concern     Not on file     Social History Narrative         FAMILY HISTORY     Family History   Problem Relation Age of Onset     Anxiety Disorder Mother      Anxiety Disorder Maternal Grandmother      Anxiety Disorder Sister      Anxiety Disorder Brother      Substance Abuse Sister      Substance Abuse Brother      MENTAL ILLNESS Mother      MENTAL ILLNESS Brother      Depression Mother      Depression Maternal Grandmother      Thyroid Disease Mother        REVIEW OF SYSTEMS   A comprehensive review of systems was negative except for items noted in HPI/Subjective.    PHYSICAL EXAMINATION   Temp (24hrs), Av.3  F (36.8   C), Min:98.1  F (36.7  C), Max:98.5  F (36.9  C)     /70 (BP Location: Right arm)  Pulse 61  Temp 98.5  F (36.9  C) (Oral)  Resp 18  Wt 101.2 kg (223 lb 1.7 oz)  SpO2 95%  BMI 39.52 kg/m2 room air    Intake/Output Summary (Last 24 hours) at 06/05/17 0802  Last data filed at 06/05/17 0700   Gross per 24 hour   Intake             4224 ml   Output                0 ml   Net             4224 ml      In: 2578 [P.O.:970; I.V.:1608]  Out: -   Patient Vitals for the past 96 hrs:   Weight   06/05/17 0618 101.2 kg (223 lb 1.7 oz)      Body mass index is 39.52 kg/(m^2).      CONSTITUTIONAL/GENERAL APPEARANCE: Alert  female. No Apparent Distress.  PSYCHIATRIC: Pleasant and appropriate mood and affect. Oriented x 3.  EARS, NOSE,THROAT,MOUTH: External ears and nose overall normal. normal oral mucosa.   NECK: Neck appearance normal. No neck masses and the thyroid not enlarged.   RESPIRATORY: Non-labored effort. Reduced BS with dulllness R lower chest with egophony  CARDIOVASCULAR: S1, S2, regular rate and rhythm, no murmur. Extremities with no edema.  ABDOMEN: round, soft, non-tender, non-distended, no palpable masses.  LYMPHATIC: no cervical or axillary lymphadenopathy.  SKIN: Skin color was normal. No clubbing or cyanosis. No palpable skin abnormalities.    LABORATORY ASSESSMENT    No lab results found in last 7 days.    Recent Labs  Lab 06/04/17 0744 06/03/17 1945   WBC 11.7* 13.9*   RBC 3.72* 4.07   HGB 10.6* 11.6*   HCT 31.9* 35.1   MCV 86 86   MCH 28.5 28.5   MCHC 33.2 33.0   RDW 13.9 13.8   PLT Canceled, Test credited Duplicate request  [phone]       Recent Labs  Lab 06/04/17  0744 06/03/17 1945 06/02/17  0021    137 134   POTASSIUM 4.0 3.9 3.7   CHLORIDE 110* 103 100   CO2 25 28 28   ANIONGAP 7 6 6   BUN 4* 4* 8   CR 0.47* 0.51* 0.67   GFRESTIMATED >90Non  GFR Calc >90Non  GFR Calc >90Non  GFR Calc   GFRESTBLACK >90African American GFR Calc >90African  American GFR Calc >90African American GFR Calc   ASHLY 8.2* 8.7 8.6     No lab results found in last 7 days.    Recent Labs  Lab 06/04/17  0744   INR 1.25*       Additional labs and/or comments: ESR 70, , calcitonin and lactate normal  Results for SHREYAS BERKOWITZ (MRN [id number]) as of 6/5/2017 10:15   Ref. Range 6/5/2017 08:50 6/5/2017 09:05   Color Fluid Unknown Orange    Appearance Fluid Unknown Hazy    WBC Fluid Latest Units: /uL 2519    Glucose Fluid Latest Units: mg/dL 90    Glucose Fluid Source Unknown Pleural fluid    Lactate Dehydrogenase Fluid Latest Units: U/L 346    LD Fluid Source Unknown Pleural fluid    Ph Fluid Latest Units: pH 8.0    pH Fluid Source Unknown Pleural fluid    Protein Total Fluid Latest Units: g/dL 3.6    Protein Total Fluid Source Unknown Pleural fluid        IMAGING    Chest ct 6/4/17  FINDINGS:  No acute thoracic aortic abnormality. No evidence for  pulmonary embolism. Small to moderate right pleural effusion.  Prominent right chest base atelectasis. Cannot exclude airspace  disease at the underlying right lower lobe and right middle lobe.  There is some atelectasis that is mild at the left lung base as well.  Upper abdomen images are negative.         IMPRESSION:  1. No pulmonary embolism or acute thoracic aortic abnormality.  2. Small to moderate right pleural effusion. Prominent right base  atelectasis.  3. Airspace disease at the right chest base not entirely excluded    cxr 6/5/17  IMPRESSION: No evidence of pneumothorax following thoracentesis. There  is atelectasis or infiltrate in the right lung base. The left lung is  clear.  PFT & OTHER TESTING       ASSESSMENT / PLAN      ACTIVE HOSPITAL PROBLEMS  Active Problems:    Pneumonia      ASSESSMENT : 26 year old female with past medical history significant for ADHD, anxiety, fibrocystic changes, overall healthy, who presents with chest pain/SOB and R pleural effusion. She was recent extensive GI evaluation at [facility] for  RLQ paiin which has resolved. She developed cough at [facility] but abd ct and cxr showed no significant chest findings. Her pleural fluid is consistent with exudate. There is no pulmonary emboli. Abdominal pain can be related to lower lobe pneumonia but this was not present at [facility]. I can not relate her RLQ abd pain to her current pleural effusion. She has no symptoms consistent with autoimmune disorder. Her CPR and sed rate are very elevated but I see no evidence of vasculitis. I agree with treatment for hospital acquired pneumonia and follow up pleural fluid/sputum cultures.     Pulmonary Diagnoses: Chest Pain R07.9, Pleural effusion J91.8 and Pnemonia unspec J18.9    PLAN:    Broad antibiotics    Pleural fluid/sputum follow up      Adriano Cummins MD  [facility]  Pager [phone]  Office: [phone]

## 2017-06-05 NOTE — PROGRESS NOTES
[facility]  Hospitalist Progress Note        Francesco Mosher,   2017        Interval History:      Patient anticipating for thoracentesis today. Reports mild shortness of breath and discomfort. Discussed plan of care, verbalized understanding.          Assessment and Plan:        26-year-old with progressive right-sided chest and abdominal discomfort with a finding of a moderate pleural effusion and leukocytosis, being admitted for further treatment being currently diagnosed as hospital-acquired pneumonia.       Right-sided pleuritic pain with moderate pleural effusion suspected pneumonia with parapneumonic effusion. Patient with pleural effusion noted on admission. Recently admitted and discharged from [facility] per patient report for evaluation of stomach discomfort. Had EGD at [facility] per report.    - Zosyn, Vancomycin, Ceftriaxone.    - Pulmonary following, greatly appreciated.    - Ultrasound-guided thoracentesis with fluid removal for Gram stain, culture and chemistries.     ADHD. Stable.    - Continue the patient on her Adderall and Lexapro.     Cough, right-sided chest pain.  This is likely quite inflammatory with elevated CRP level.     - Continue the patient on her Benzonatate and oxycodone.     Deep venous thrombosis prophylaxis with compression boots     Disposition: Inpatient, likely 1-2 days pending pulmonary eval.                    Physical Exam:      Heart Rate: 60    Blood pressure 126/64, pulse 61, temperature 98.1  F (36.7  C), temperature source Oral, resp. rate 18, weight 101.2 kg (223 lb 1.7 oz), SpO2 99 %.    Vitals:    17 0618   Weight: 101.2 kg (223 lb 1.7 oz)       Vital Sign Ranges  Temperature Temp  Av.2  F (36.8  C)  Min: 98  F (36.7  C)  Max: 98.4  F (36.9  C)   Blood pressure Systolic (24hrs), Av , Min:121 , Max:126        Diastolic (24hrs), Av, Min:64, Max:70      Pulse Pulse  Av  Min: 61  Max: 61   Respirations Resp  Av.7  Min: 16   Max: 18   Pulse oximetry SpO2  Av %  Min: 93 %  Max: 99 %     Vital Signs with Ranges  Temp:  [98  F (36.7  C)-98.4  F (36.9  C)] 98.1  F (36.7  C)  Pulse:  [61] 61  Heart Rate:  [60-63] 60  Resp:  [16-18] 18  BP: (121-126)/(64-70) 126/64  SpO2:  [93 %-99 %] 99 %    I/O Last 3 Shifts:   I/O last 3 completed shifts:  In: 2578 [P.O.:970; I.V.:1608]  Out: -     I/O past 24 hours:     Intake/Output Summary (Last 24 hours) at 17 07  Last data filed at 17 0700   Gross per 24 hour   Intake             4224 ml   Output                0 ml   Net             4224 ml     GENERAL: Alert and oriented. NAD. Conversational, appropriate.   HEENT: Normocephalic. EOMI. No icterus or injection. Nares normal.   LUNGS: Diminished to right side base. No dyspnea at rest.   HEART: Regular rate. Extremities perfused.   ABDOMEN: Soft, nontender, and nondistended. Positive bowel sounds.   EXTREMITIES: No LE edema noted.   NEUROLOGIC: Moves extremities x4. No acute focal neurologic abnormalities noted.          Prior to Admission Medications:        Prescriptions Prior to Admission   Medication Sig Dispense Refill Last Dose     BENZONATATE PO Take 100 mg by mouth 3 times daily as needed for cough    at prn      OXYCODONE HCL PO Take 5 mg by mouth every 4 hours as needed   6/3/2017 at Unknown time     PANTOPRAZOLE SODIUM PO Take 40 mg by mouth 2 times daily (before meals)   6/3/2017 at Unknown time     amphetamine-dextroamphetamine (ADDERALL XR) 10 MG per capsule 10 mg daily    Past Week at Unknown time     amphetamine-dextroamphetamine (ADDERALL XR) 30 MG per capsule Take 30 mg by mouth daily    Past Week at Unknown time     escitalopram (LEXAPRO) 10 MG tablet Take 10 mg by mouth daily    6/3/2017 at Unknown time            Medications:        Current Facility-Administered Medications   Medication Last Rate     - MEDICATION INSTRUCTIONS -       NaCl 150 mL/hr at 17 3710     Current Facility-Administered Medications    Medication Dose Route Frequency     escitalopram  10 mg Oral Daily     pantoprazole (PROTONIX) EC tablet 40 mg  40 mg Oral BID AC     amphetamine-dextroamphetamine  30 mg Oral Daily     piperacillin-tazobactam  4.5 g Intravenous Q6H     levofloxacin  750 mg Intravenous Q24H     senna-docusate  1-2 tablet Oral BID     sodium chloride (PF)  3 mL Intracatheter Q8H     vancomycin (VANCOCIN) IV  1,250 mg Intravenous Q8H     Current Facility-Administered Medications   Medication Dose Route Frequency     benzonatate (TESSALON) capsule 100 mg  100 mg Oral TID PRN     naloxone  0.1-0.4 mg Intravenous Q2 Min PRN     acetaminophen  650 mg Oral Q4H PRN     senna-docusate  1-2 tablet Oral BID PRN     magnesium hydroxide  30 mL Oral Daily PRN     bisacodyl  10 mg Rectal Daily PRN     ondansetron  4 mg Oral Q6H PRN    Or     ondansetron  4 mg Intravenous Q6H PRN     lidocaine  1 mL Other Q1H PRN     lidocaine 4%   Topical Q1H PRN     sodium chloride (PF)  3 mL Intracatheter Q1H PRN     - MEDICATION INSTRUCTIONS -   Does not apply Continuous PRN     ketorolac  30 mg Intravenous Q6H PRN     oxyCODONE-acetaminophen  1-2 tablet Oral Q4H PRN            Data:      Lab data reviewed.     Recent Labs  Lab 06/04/17  0744 06/03/17  1945 06/02/17  0021   HGB 10.6* 11.6* 11.5*   MCV 86 86 85   PLT Canceled, Test credited Duplicate request  [phone]   INR 1.25*  --   --     137 134   POTASSIUM 4.0 3.9 3.7   CHLORIDE 110* 103 100   CO2 25 28 28   BUN 4* 4* 8   CR 0.47* 0.51* 0.67   ANIONGAP 7 6 6   ASHLY 8.2* 8.7 8.6   * 111* 139*   TROPI  --   --  <0.015The 99th percentile for upper reference range is 0.045 ug/L.  Troponin values in the range of 0.045 - 0.120 ug/L may be associated with risks of adverse clinical events.           Imaging:      Imaging data reviewed.     Dr. Francesco Mosher D.O.  [place] [facility]ist  Pager [phone]

## 2017-06-05 NOTE — PROGRESS NOTES
RADIOLOGY PROCEDURE NOTE  Patient name: Dahlia Hayes  MRN: [id number]  : 1990    Pre-procedure diagnosis: Right pleural effusion  Post-procedure diagnosis: Same    Procedure Date/Time: 2017  8:47 AM  Procedure: Thoracentesis  Estimated blood loss: None  Specimen(s) collected with description: Pleural fluid.  The patient tolerated the procedure well with no immediate complications.    See imaging dictation for procedural details and findings.    Provider name: Placido Quiroz  Assistant(s):None

## 2017-06-05 NOTE — PLAN OF CARE
Problem: Goal Outcome Summary  Goal: Goal Outcome Summary  Alert and oriented x 4. VSS on RA. C/o pain in R posterior chest; received PRN percocet. Denies nausea, sob. IV antibiotics. IVF. LS diminished. Independent in room. Tolerating reg diet. Nursing will continue to monitor.

## 2017-06-05 NOTE — PLAN OF CARE
Problem: Goal Outcome Summary  Goal: Goal Outcome Summary  Outcome: Improving  A&Ox4, VSS,  C/O pain on posterior right chest, relieved by percocet and Toradol. Lungs diminished on the RLL. Thoracentesis today, 450ml out. Continues on ABO. Ind in room.

## 2017-06-05 NOTE — PLAN OF CARE
Problem: Pneumonia (Adult)  Goal: Signs and Symptoms of Listed Potential Problems Will be Absent or Manageable (Pneumonia)  Signs and symptoms of listed potential problems will be absent or manageable by discharge/transition of care (reference Pneumonia (Adult) CPG).   Patient A&0x4. Up independently. Crackles noted in RRL. Has frequent dry cough. C/o right side back pain. No edema noted.

## 2017-06-05 NOTE — PHARMACY-VANCOMYCIN DOSING SERVICE
Pharmacy Vancomycin Note  Date of Service 2017  Patient's  1990   26 year old, female    Indication: Healthcare-Associated Pneumonia  Goal Trough Level: 15-20 mg/L  Day of Therapy: 3  Current Vancomycin regimen:  1250 mg IV q8h    Current estimated CrCl = Estimated Creatinine Clearance: 189.4 mL/min (based on Cr of 0.52).    Creatinine for last 3 days  6/3/2017:  7:45 PM Creatinine 0.51 mg/dL  2017:  7:44 AM Creatinine 0.47 mg/dL  2017:  8:01 AM Creatinine 0.52 mg/dL    Recent Vancomycin Levels (past 3 days)  2017: 10:30 AM Vancomycin Level 13.0 mg/L    Vancomycin IV Administrations (past 72 hours)                   vancomycin 1250 mg in 0.9% NaCl 250 mL PREMIX (mg) 1,250 mg New Bag 17 0304     1,250 mg New Bag 17 1925     1,250 mg New Bag  0952    vancomycin (VANCOCIN) 2,000 mg in NaCl 0.9 % 500 mL intermittent infusion (mg) 2,000 mg New Bag 17 0204                Nephrotoxins and other renal medications (Future)    Start     Dose/Rate Route Frequency Ordered Stop    17 1000  vancomycin 1250 mg in 0.9% NaCl 250 mL PREMIX      1,250 mg Intravenous EVERY 8 HOURS 17 0241      17 0600  piperacillin-tazobactam (ZOSYN) 4.5 g vial to attach to  mL bag      4.5 g  over 1 Hours Intravenous EVERY 6 HOURS 17 0214      17 0447  ketorolac (TORADOL) injection 30 mg      30 mg Intravenous EVERY 6 HOURS PRN 17 0447 17 0446             Contrast Orders - past 72 hours (72h ago through future)    Start     Dose/Rate Route Frequency Ordered Stop    17 213  iopamidol (ISOVUE-370) solution 76 mL      76 mL Intravenous ONCE 17          Interpretation of levels and current regimen:  Trough level is  Therapeutic  (considering 4th dose and also on Zosyn/Levaquin)  Has serum creatinine changed > 50% in last 72 hours: No    Urine output:  unable to determine    Renal Function: Stable    Plan:  1.  Continue Current  Dose  2.  Pharmacy will check trough levels as appropriate in 1-3 Days.    3. Serum creatinine levels will be ordered daily for the first week of therapy and at least twice weekly for subsequent weeks.      Matty Eugene        .

## 2017-06-06 VITALS
WEIGHT: 223.11 LBS | HEART RATE: 72 BPM | BODY MASS INDEX: 39.52 KG/M2 | RESPIRATION RATE: 16 BRPM | OXYGEN SATURATION: 93 % | DIASTOLIC BLOOD PRESSURE: 73 MMHG | SYSTOLIC BLOOD PRESSURE: 123 MMHG | TEMPERATURE: 99 F

## 2017-06-06 LAB
ANION GAP SERPL CALCULATED.3IONS-SCNC: 8 MMOL/L (ref 3–14)
BUN SERPL-MCNC: 4 MG/DL (ref 7–30)
CALCIUM SERPL-MCNC: 8.7 MG/DL (ref 8.5–10.1)
CHLORIDE SERPL-SCNC: 107 MMOL/L (ref 94–109)
CO2 SERPL-SCNC: 26 MMOL/L (ref 20–32)
CREAT SERPL-MCNC: 0.6 MG/DL (ref 0.52–1.04)
ERYTHROCYTE [DISTWIDTH] IN BLOOD BY AUTOMATED COUNT: 13.7 % (ref 10–15)
GFR SERPL CREATININE-BSD FRML MDRD: ABNORMAL ML/MIN/1.7M2
GLUCOSE SERPL-MCNC: 100 MG/DL (ref 70–99)
HCT VFR BLD AUTO: 31.8 % (ref 35–47)
HGB BLD-MCNC: 10.7 G/DL (ref 11.7–15.7)
MCH RBC QN AUTO: 28.5 PG (ref 26.5–33)
MCHC RBC AUTO-ENTMCNC: 33.6 G/DL (ref 31.5–36.5)
MCV RBC AUTO: 85 FL (ref 78–100)
PLATELET # BLD AUTO: 508 10E9/L (ref 150–450)
POTASSIUM SERPL-SCNC: 3.7 MMOL/L (ref 3.4–5.3)
RBC # BLD AUTO: 3.75 10E12/L (ref 3.8–5.2)
SODIUM SERPL-SCNC: 141 MMOL/L (ref 133–144)
WBC # BLD AUTO: 6.6 10E9/L (ref 4–11)

## 2017-06-06 PROCEDURE — 25000132 ZZH RX MED GY IP 250 OP 250 PS 637: Performed by: INTERNAL MEDICINE

## 2017-06-06 PROCEDURE — 25000128 H RX IP 250 OP 636: Performed by: INTERNAL MEDICINE

## 2017-06-06 PROCEDURE — 99239 HOSP IP/OBS DSCHRG MGMT >30: CPT | Performed by: HOSPITALIST

## 2017-06-06 PROCEDURE — 85027 COMPLETE CBC AUTOMATED: CPT | Performed by: HOSPITALIST

## 2017-06-06 PROCEDURE — 36415 COLL VENOUS BLD VENIPUNCTURE: CPT | Performed by: HOSPITALIST

## 2017-06-06 PROCEDURE — 80048 BASIC METABOLIC PNL TOTAL CA: CPT | Performed by: HOSPITALIST

## 2017-06-06 RX ORDER — LEVOFLOXACIN 500 MG/1
500 TABLET, FILM COATED ORAL DAILY
Qty: 6 TABLET | Refills: 0 | Status: SHIPPED | OUTPATIENT
Start: 2017-06-06 | End: 2017-06-12

## 2017-06-06 RX ADMIN — PIPERACILLIN AND TAZOBACTAM 4.5 G: 4; .5 INJECTION, POWDER, FOR SOLUTION INTRAVENOUS at 06:38

## 2017-06-06 RX ADMIN — BENZONATATE 100 MG: 100 CAPSULE ORAL at 04:25

## 2017-06-06 RX ADMIN — VANCOMYCIN HYDROCHLORIDE 1250 MG: 5 INJECTION, POWDER, LYOPHILIZED, FOR SOLUTION INTRAVENOUS at 02:30

## 2017-06-06 RX ADMIN — PIPERACILLIN AND TAZOBACTAM 4.5 G: 4; .5 INJECTION, POWDER, FOR SOLUTION INTRAVENOUS at 14:21

## 2017-06-06 RX ADMIN — OXYCODONE HYDROCHLORIDE AND ACETAMINOPHEN 2 TABLET: 5; 325 TABLET ORAL at 11:12

## 2017-06-06 RX ADMIN — OXYCODONE HYDROCHLORIDE AND ACETAMINOPHEN 2 TABLET: 5; 325 TABLET ORAL at 04:25

## 2017-06-06 RX ADMIN — PANTOPRAZOLE SODIUM 40 MG: 40 TABLET, DELAYED RELEASE ORAL at 17:11

## 2017-06-06 RX ADMIN — ESCITALOPRAM 10 MG: 10 TABLET, FILM COATED ORAL at 09:12

## 2017-06-06 RX ADMIN — PANTOPRAZOLE SODIUM 40 MG: 40 TABLET, DELAYED RELEASE ORAL at 06:38

## 2017-06-06 RX ADMIN — ACETAMINOPHEN 650 MG: 325 TABLET, FILM COATED ORAL at 17:11

## 2017-06-06 RX ADMIN — PIPERACILLIN AND TAZOBACTAM 4.5 G: 4; .5 INJECTION, POWDER, FOR SOLUTION INTRAVENOUS at 01:29

## 2017-06-06 ASSESSMENT — PAIN DESCRIPTION - DESCRIPTORS: DESCRIPTORS: CONSTANT

## 2017-06-06 NOTE — PROGRESS NOTES
[facility]  Hospitalist Progress Note        Francesco Mosher, DO  2017        Interval History:      Patient states she is doing well.          Assessment and Plan:        26-year-old with progressive right-sided chest and abdominal discomfort with a finding of a moderate pleural effusion and leukocytosis, being admitted for further treatment being currently diagnosed as hospital-acquired pneumonia.       Right-sided pleuritic pain with moderate pleural effusion suspected pneumonia with parapneumonic effusion. Patient with pleural effusion noted on admission. Recently admitted and discharged from [facility] per patient report for evaluation of stomach discomfort. Had EGD at [facility] per report.   - Zosyn. May be able to transition to oral antibiotics in near term.   - Pulmonary following, greatly appreciated.   - Ultrasound-guided thoracentesis with fluid removal completed , cultures pending.      ADHD. Stable.   - Continue the patient on her Adderall and Lexapro.      Cough, right-sided chest pain.  This is likely quite inflammatory with elevated CRP level.    - Continue the patient on her Benzonatate and oxycodone.      Deep venous thrombosis prophylaxis with compression boots      Disposition: Discharge today or tomorrow pending pulmonary eval.                    Physical Exam:      Heart Rate: 58    Blood pressure 130/72, pulse 72, temperature 98.8  F (37.1  C), temperature source Oral, resp. rate 16, weight 101.2 kg (223 lb 1.7 oz), SpO2 94 %.    Vitals:    17 0618   Weight: 101.2 kg (223 lb 1.7 oz)       Vital Sign Ranges  Temperature Temp  Av.6  F (37  C)  Min: 98.5  F (36.9  C)  Max: 98.8  F (37.1  C)   Blood pressure Systolic (24hrs), Av , Min:124 , Max:132        Diastolic (24hrs), Av, Min:65, Max:74      Pulse Pulse  Av  Min: 72  Max: 72   Respirations Resp  Avg: 15.7  Min: 15  Max: 16   Pulse oximetry SpO2  Av.5 %  Min: 94 %  Max: 95 %     Vital Signs with  Ranges  Temp:  [98.5  F (36.9  C)-98.8  F (37.1  C)] 98.8  F (37.1  C)  Pulse:  [72] 72  Heart Rate:  [57-58] 58  Resp:  [15-16] 16  BP: (124-132)/(65-74) 130/72  SpO2:  [94 %-95 %] 94 %    I/O Last 3 Shifts:   I/O last 3 completed shifts:  In: 5964 [P.O.:1660; I.V.:4304]  Out: 450 [Drains:450]    I/O past 24 hours:     Intake/Output Summary (Last 24 hours) at 06/06/17 0933  Last data filed at 06/06/17 0600   Gross per 24 hour   Intake             3422 ml   Output                0 ml   Net             3422 ml     GENERAL: Alert and oriented. NAD. Conversational, appropriate.   HEENT: Normocephalic. EOMI. No icterus or injection. Nares normal.   LUNGS: Diminished to right side base, improved. No dyspnea at rest.   HEART: Regular rate. Extremities perfused.   ABDOMEN: Soft, nontender, and nondistended. Positive bowel sounds.   EXTREMITIES: No LE edema noted.   NEUROLOGIC: Moves extremities x4. No acute focal neurologic abnormalities noted.          Prior to Admission Medications:        Prescriptions Prior to Admission   Medication Sig Dispense Refill Last Dose     BENZONATATE PO Take 100 mg by mouth 3 times daily as needed for cough    at prn      OXYCODONE HCL PO Take 5 mg by mouth every 4 hours as needed   6/3/2017 at Unknown time     PANTOPRAZOLE SODIUM PO Take 40 mg by mouth 2 times daily (before meals)   6/3/2017 at Unknown time     amphetamine-dextroamphetamine (ADDERALL XR) 10 MG per capsule 10 mg daily    Past Week at Unknown time     amphetamine-dextroamphetamine (ADDERALL XR) 30 MG per capsule Take 30 mg by mouth daily    Past Week at Unknown time     escitalopram (LEXAPRO) 10 MG tablet Take 10 mg by mouth daily    6/3/2017 at Unknown time            Medications:        Current Facility-Administered Medications   Medication Last Rate     - MEDICATION INSTRUCTIONS -       NaCl 150 mL/hr at 06/05/17 2350     Current Facility-Administered Medications   Medication Dose Route Frequency     escitalopram  10 mg  Oral Daily     pantoprazole (PROTONIX) EC tablet 40 mg  40 mg Oral BID AC     amphetamine-dextroamphetamine  30 mg Oral Daily     piperacillin-tazobactam  4.5 g Intravenous Q6H     levofloxacin  750 mg Intravenous Q24H     senna-docusate  1-2 tablet Oral BID     sodium chloride (PF)  3 mL Intracatheter Q8H     vancomycin (VANCOCIN) IV  1,250 mg Intravenous Q8H     Current Facility-Administered Medications   Medication Dose Route Frequency     benzonatate (TESSALON) capsule 100 mg  100 mg Oral TID PRN     naloxone  0.1-0.4 mg Intravenous Q2 Min PRN     acetaminophen  650 mg Oral Q4H PRN     senna-docusate  1-2 tablet Oral BID PRN     magnesium hydroxide  30 mL Oral Daily PRN     bisacodyl  10 mg Rectal Daily PRN     ondansetron  4 mg Oral Q6H PRN    Or     ondansetron  4 mg Intravenous Q6H PRN     lidocaine  1 mL Other Q1H PRN     lidocaine 4%   Topical Q1H PRN     sodium chloride (PF)  3 mL Intracatheter Q1H PRN     - MEDICATION INSTRUCTIONS -   Does not apply Continuous PRN     ketorolac  30 mg Intravenous Q6H PRN     oxyCODONE-acetaminophen  1-2 tablet Oral Q4H PRN            Data:      Lab data reviewed.     Recent Labs  Lab 06/06/17  0807 06/05/17  0801 06/04/17  0744  06/02/17  0021   HGB 10.7* 10.3* 10.6*  < > 11.5*   MCV 85 85 86  < > 85   * 451* Canceled, Test credited Duplicate request  398  < > 338   INR  --   --  1.25*  --   --     139 142  < > 134   POTASSIUM 3.7 3.8 4.0  < > 3.7   CHLORIDE 107 105 110*  < > 100   CO2 26 26 25  < > 28   BUN 4* 5* 4*  < > 8   CR 0.60 0.52 0.47*  < > 0.67   ANIONGAP 8 8 7  < > 6   ASHLY 8.7 8.6 8.2*  < > 8.6   * 103* 108*  < > 139*   TROPI  --   --   --   --  <0.015The 99th percentile for upper reference range is 0.045 ug/L.  Troponin values in the range of 0.045 - 0.120 ug/L may be associated with risks of adverse clinical events.   < > = values in this interval not displayed.        Imaging:      Imaging data reviewed.     Dr. Francesco Mosher,  YOANA  [facility]  Pager [phone]

## 2017-06-06 NOTE — PROGRESS NOTES
Met with patient to discuss discharge plans.Patient stating she is feeling much better,on room air, appetite improving.Discharging today,appointments completed.

## 2017-06-06 NOTE — CONSULTS
[facility]  Antimicrobial Stewardship Team (AST) Note   Antimicrobial Stewardship Program Clinical Note - a joint venture between [facility] and [facility] Consult[facility] [employer] to optimize antibiotic management.      Allergies: Review of patient's allergies indicates no known allergies.    Brief Summary: 26-year-old with progressive right-sided chest and abdominal discomfort with a finding of a moderate pleural effusion and leukocytosis, being admitted on 6/3 for further treatment being currently diagnosed as hospital-acquired pneumonia. Past medical history significant for ADHD, anxiety, fibrocystic changes, overall healthy.  Recently hospitalized at [facility] 5/25 - 5/30 for lower abdominal pain radiating to the back.  Review of Care Everywhere shows she had also presented with bloody diarrhea and fever and had a white count of 34.6.  She had a CT abdomen/pelvis that suggested possible inflammatory bowel process.  MRI and RUQ ultrasound showed no definitive gallbladder disease.  She was subsequently discharged without definitive cause of her symptoms found and recommended to see GI to have a colonoscopy and upper GI. Since that time her pain has moved more to her right shoulder and back. She developed cough approximately 5 days ago and cxr 5/29 normal      Right-sided pleuritic pain with moderate pleural effusion suspected pneumonia with parapneumonic effusion. Patient with pleural effusion noted on admission. Recently admitted and discharged from [facility] per patient report for evaluation of stomach discomfort. Had EGD at [facility] per report.                         - Zosyn, Vancomycin, Levofloxacin                        - Pulmonary following, greatly appreciated.                         - Ultrasound-guided thoracentesis with fluid removal for Gram stain, culture and chemistries. (450 mL removed)    Assessment:  Agree with discontinuation of vancomycin as no resistant gram positive  organisms isolated.  Would also favor discontinuing levofloxacin as no indication for continued double gram negative coverage.  If cultures continue to be negative, would consider d/c Zosyn as well as infectious work-up negative thus far (i.e procal, cultures) and patient high C diff risk given recent hospitalization and bloody diarrhea at [facility].        Current Anti-infective Orders:  Anti-infectives (Future)    Start     Dose/Rate Route Frequency Ordered Stop    06/05/17 0000  levofloxacin (LEVAQUIN) infusion 750 mg      750 mg  100 mL/hr over 90 Minutes Intravenous EVERY 24 HOURS 06/04/17 0214      06/04/17 1000  vancomycin 1250 mg in 0.9% NaCl 250 mL PREMIX      1,250 mg Intravenous EVERY 8 HOURS 06/04/17 0241      06/04/17 0600  piperacillin-tazobactam (ZOSYN) 4.5 g vial to attach to  mL bag      4.5 g  over 1 Hours Intravenous EVERY 6 HOURS 06/04/17 0214              Clinical Features/Vital Signs  Vital signs:  Tmax - 6/3 = 100.3    Lab Results  WBC 6/2 = 8.9, 6/3= 13.9, 6/4 = 11.7, 6/5 = 8, 6/6 = 6.6  Procal 6/4 <0.05, 6/5 <0.05  CRP 6/3 = 150  Sed rate 6/3 = 70        Culture Results -     7-Day Micro Results      Procedure Component Value Units Date/Time     Cell count with differential fluid [[id number]] Collected: 06/05/17 0850     Order Status: Completed Lab Status: Edited Result - FINAL Updated: 06/05/17 1037     Specimen: Pleural fluid from Pleural fluid       Body Fluid Analysis Source Pleural fluid      Color Fluid Orange      Appearance Fluid Hazy      WBC Fluid -- /uL       2519   No reference ranges have been established.  This result should be interpreted in    the context of the patient's clinical condition and compared to simultaneous    measurement in the patient's blood.  Refer to Lab Guide for specific    interpretive guidelines.   CORRECTED ON 06/05 AT 1036: PREVIOUSLY REPORTED AS 2519         % Neutrophils Fluid 37 %       % Lymphocytes Fluid 46 %       % Mono/Macro Fluid 17 %      pH fluid  [[id number]] Collected: 06/05/17 0850     Order Status: Completed Lab Status: Final result Updated: 06/05/17 0942     Specimen: Pleural fluid from Pleural fluid       pH Fluid Source Pleural fluid      Ph Fluid 8.0 pH        No reference ranges have been established.  This result should be interpreted   in    the context of the patient's clinical condition and compared to simultaneous    measurement in the patient's blood.  Refer to Lab Guide for specific    interpretive guidelines.           Protein fluid [[id number]] Collected: 06/05/17 0850     Order Status: Completed Lab Status: Final result Updated: 06/05/17 0950     Specimen: Pleural fluid from Pleural fluid       Protein Total Fluid Source Pleural fluid      Protein Total Fluid 3.6 g/dL        No reference ranges have been established.  This result should be interpreted   in    the context of the patient's clinical condition and compared to simultaneous    measurement in the patient's blood.  Refer to Lab Guide for specific    interpretive guidelines.           Gram stain [[id number]] Collected: 06/05/17 0850     Order Status: Completed Lab Status: Final result Updated: 06/05/17 1506     Specimen: Pleural fluid       Specimen Description Pleural fluid      Gram Stain --      No organisms seen   Few WBC'S seen predominantly PMN's         Micro Report Status FINAL 06/05/2017     Lactate dehydrogenase fluid [[id number]] Collected: 06/05/17 0850     Order Status: Completed Lab Status: Final result Updated: 06/05/17 0950     Specimen: Pleural fluid from Pleural fluid       LD Fluid Source Pleural fluid      Lactate Dehydrogenase Fluid 346 U/L        No reference ranges have been established.  This result should be interpreted   in    the context of the patient's clinical condition and compared to simultaneous    measurement in the patient's blood.  Refer to Lab Guide for specific    interpretive guidelines.           Glucose fluid [[id number]] Collected: 06/05/17 0850     Order  Status: Completed Lab Status: Final result Updated: 06/05/17 0950     Specimen: Pleural fluid from Pleural fluid       Glucose Fluid Source Pleural fluid      Glucose Fluid 90 mg/dL        No reference ranges have been established.  This result should be interpreted   in    the context of the patient's clinical condition and compared to simultaneous    measurement in the patient's blood.  Refer to Lab Guide for specific    interpretive guidelines.           Sputum Culture Aerobic Bacterial [[id number]] Collected: 06/04/17 0900     Order Status: Completed Lab Status: Preliminary result Updated: 06/05/17 1053     Specimen: Sputum       Specimen Description Sputum      Special Requests Screen      Culture Micro Light growth Normal jace to date      Micro Report Status Pending     Gram stain [[id number]] Collected: 06/04/17 0900     Order Status: Completed Lab Status: Final result Updated: 06/04/17 1421     Specimen: Sputum       Specimen Description Sputum      Special Requests Screen      Gram Stain --      <10 Squamous epithelial cells/low power field   >25 PMNs/low power field   Mixed gram positive and gram negative bacteria present.         Micro Report Status FINAL 06/04/2017     Blood culture [] Collected: 06/03/17 1950     Order Status: Completed Lab Status: Preliminary result Updated: 06/06/17 0650     Specimen: Blood from Hand, Right       Specimen Description Blood Right Hand      Special Requests Aerobic and anaerobic bottles received      Culture Micro No growth after 3 days      Micro Report Status Pending     Blood culture [] Collected: 06/03/17 1945     Order Status: Completed Lab Status: Preliminary result Updated: 06/06/17 0650     Specimen: Blood from Arm, Left       Specimen Description Blood Left Arm      Special Requests Aerobic and anaerobic bottles received      Culture Micro No growth after 3 days      Micro Report Status Pending     Urine Culture [[id number]] Collected: 06/02/17 0130     Order  Status: Completed Lab Status: Final result Updated: 06/03/17 1111     Specimen: Urine from Urine clean catch       Specimen Description Unspecified Urine      Special Requests Specimen received in preservative      Culture Micro --      10,000 to 50,000 colonies/mL mixed urogenital jace Susceptibility testing not    routinely done         Micro Report Status FINAL 06/03/2017             Imaging - past 3 days:  Xr Chest 1 View    Result Date: 6/5/2017  XR CHEST 1 VW 6/5/2017 9:05 AM HISTORY: Right thoracentesis COMPARISON: None     IMPRESSION: No evidence of pneumothorax following thoracentesis. There is atelectasis or infiltrate in the right lung base. The left lung is clear. DANIELA SNELL MD    Us Thoracentesis    Result Date: 6/5/2017  ULTRASOUND GUIDED THORACENTESIS  6/5/2017 9:09 AM HISTORY: Right-sided chest pain. FINDINGS: Ultrasound was used to evaluate for the presence and best approach for drainage of a pleural effusion. Written and oral informed consent was obtained. A pause for the cause procedure to verify the correct patient and correct procedure. The skin overlying the right chest posteriorly was prepped and draped in the usual sterile fashion. The subcutaneous tissues were anesthetized with 9 mL 1% lidocaine. A catheter was advanced into the pleural space and 450 mL of  delia colored fluid was drained. Patient was monitored by nurse under my direct supervision throughout the exam. Ultrasound images were permanently stored.  There were no immediate complications. Patient left the ultrasound suite in satisfactory condition.     IMPRESSION: Technically successful thoracentesis without immediate complications. LACIE WARREN MD    Chest Ct, Iv Contrast Only - Pe Protocol    Result Date: 6/3/2017  CT CHEST PULMONARY EMBOLISM WITH CONTRAST  6/3/2017 9:48 PM HISTORY:  SOB, pleuritic CP, elevated D-dimer. TECHNIQUE: Scans obtained from the apices through the diaphragm with IV contrast. 76 mL Isovue 370  injected. Radiation dose for this scan was reduced using automated exposure control, adjustment of the mA and/or kV according to patient size, or iterative reconstruction technique. COMPARISON:  None. FINDINGS:  No acute thoracic aortic abnormality. No evidence for pulmonary embolism. Small to moderate right pleural effusion. Prominent right chest base atelectasis. Cannot exclude airspace disease at the underlying right lower lobe and right middle lobe. There is some atelectasis that is mild at the left lung base as well. Upper abdomen images are negative.     IMPRESSION: 1. No pulmonary embolism or acute thoracic aortic abnormality. 2. Small to moderate right pleural effusion. Prominent right base atelectasis. 3. Airspace disease at the right chest base not entirely excluded. GINA HORN MD      Recommendations/Interventions:  D/c levofloxacin    Discussed with ID Staff - Dr. Paul Meraz, PharmD, BCPS

## 2017-06-06 NOTE — PROGRESS NOTES
Vss, alert x4, c/o back pain due to resolving pneumonia per pt. PRN tylenol given. Pt okayed to d/c to home per MD order. Belongings returned to pt from security. Oral ABX filled from outpatient pharmacy and given to pt. Pt d/c home at 1745, escorted by nursing to front entrancen (old ER) in stable condition.

## 2017-06-06 NOTE — PLAN OF CARE
Problem: Goal Outcome Summary  Goal: Goal Outcome Summary  Outcome: No Change  1900-0730 Patient A/O x4.VSS on RA. Patient C/O right chest pain Tordal given x 1 percocet given x1.. Denies, nausea, sob at rest. IV antibiotics. IVF. LS diminished. Up independently. Dressing CDI. Tolerating reg diet. Nursing will continue to monitor.

## 2017-06-06 NOTE — PLAN OF CARE
Problem: Goal Outcome Summary  Goal: Goal Outcome Summary  Outcome: Improving  VSS, continues to be slightly CONTRERAS but feels this has gotten much better. Continues to have right sided upper back and chest pain. Cough is infrequent and dry. Awaiting pulmonology for ok to discharge.

## 2017-06-07 LAB
BACTERIA SPEC CULT: NORMAL
Lab: NORMAL
MICRO REPORT STATUS: NORMAL
SPECIMEN SOURCE: NORMAL

## 2017-06-08 NOTE — DISCHARGE SUMMARY
[facility]    Discharge Summary  Hospitalist    Date of Admission:  6/3/2017  Date of Discharge:  6/6/2017  Discharging Provider: Francesco Mosher  Date of Service (when I saw the patient): 06/06/17    Discharge Diagnoses      Pneumonia of right lower lobe due to infectious organism  Pleural effusion  Pleuritic chest pain    History of Present Illness   Dahlia Hayes is a 26-year-old female with ADHD, anxiety, fibrocystic changes, overall healthy, who presents to [facility] with the above complaints.       The patient initially had some right-sided abdominal pain.  She went in to [facility].  The pain was severe enough that they were concerned for appendicitis.  She was admitted from 05/25 through 05/30.  She had extensive workup including CT scan, MRI and ultrasound.  She also had leukocytosis of up to 37,000.  The workup was overall negative.  While she was in the hospital, she started developing a cough.  She was discharged when her workup was negative and the patient was to follow with outpatient endoscopic studies.  The patient over the last couple days has been having right-sided pleuritic pain with cough.  She is splinting.  She denies any fevers, chills.  The patient a couple days ago underwent EGD which was negative as a part of her workup for abdominal pain.  She is scheduled to undergo colonoscopy next week.  Due to ongoing pain and pleuritic in nature and cough the patient came to [facility] for further assessment.       In the emergency room, the patient was seen by Dr. Patricia Ludwig.  The patient had a temperature of 100.3.  She was borderline tachycardic.  Blood pressures were reasonable.  Blood work revealed normal basic metabolic panel, normal kidney function and LFTs are all unremarkable with the exception of slight elevation of ALT of 56 and albumin of 2.6.  CRP is elevated at 150.  Lactic acid 1.4.  Glucose 111.  White count 13,900  with a left shift with absolute neutrophil count 10,900, hemoglobin 11.6, platelets 447.  D-dimer is elevated at 3.8.  She had blood cultures obtained and she had a CT of the chest done, which showed no pulmonary embolism or thoracic aortic abnormality.  There is a small to moderate right pleural effusion and a prominent right base atelectasis.  Airspace disease in the right chest could not be entirely excluded.  The patient is being admitted for possible hospital-acquired pneumonia.     Hospital Course   Dahlia Hayes was admitted on 6/3/2017.  The following problems were addressed during her hospitalization:    26-year-old with progressive right-sided chest and abdominal discomfort with a finding of a moderate pleural effusion and leukocytosis, being admitted for further treatment being currently diagnosed as hospital-acquired pneumonia.       Right-sided pleuritic pain with moderate pleural effusion suspected pneumonia with parapneumonic effusion. Patient with pleural effusion noted on admission. Recently admitted and discharged from [facility] per patient report for evaluation of stomach discomfort. Had EGD at [facility] per report.   - Transitioned to oral levaquin course.   - Pulmonary following, greatly appreciated.   - Ultrasound-guided thoracentesis with fluid removal completed 6/5, cultures pending. Will have close outpatient follow up.      ADHD. Stable.   - Continue the patient on her Adderall and Lexapro.       Cough, right-sided chest pain.  This is likely quite inflammatory with elevated CRP level.    - Continue the patient on her Benzonatate and oxycodone.       Pending Results   These results will be followed up by PCP  Unresulted Labs Ordered in the Past 30 Days of this Admission     Date and Time Order Name Status Description    6/3/2017 1931 Blood culture Preliminary     6/3/2017 1931 Blood culture Preliminary           Code Status   Full Code       Primary Care Physician   EDI MEJIAS    Physical Exam                       Vitals:    06/05/17 0618   Weight: 101.2 kg (223 lb 1.7 oz)     Vital Signs with Ranges     I/O last 3 completed shifts:  In: 3422 [P.O.:1420; I.V.:2002]  Out: -     GENERAL: Alert and oriented. NAD. Conversational, appropriate.   HEENT: Normocephalic. EOMI. No icterus or injection. Nares normal.   LUNGS: Diminished to right side base, improved. No dyspnea at rest.   HEART: Regular rate. Extremities perfused.   ABDOMEN: Soft, nontender, and nondistended. Positive bowel sounds.   EXTREMITIES: No LE edema noted.   NEUROLOGIC: Moves extremities x4. No acute focal neurologic abnormalities noted.     Discharge Disposition   Discharged to home  Condition at discharge: Stable    Consultations This Hospital Stay   PHARMACY TO DOSE VANCO  PHARMACY TO DOSE VANCO  PULMONARY IP CONSULT    Time Spent on this Encounter   I, Francesco Mosher, personally saw the patient today and spent greater than 30 minutes discharging this patient.    Discharge Orders     XR Chest 2 Views     CBC with platelets   Last Lab Result: Hemoglobin (g/dL)      Date                     Value                06/06/2017               10.7 (L)         ----------     Basic metabolic panel     Reason for your hospital stay   Pleural effusion.     Activity   Your activity upon discharge: activity as tolerated     Follow-up and recommended labs and tests    Appointment with DR LARRY 6/13 @12:45     Follow-up and recommended labs and tests    Follow up with primary care provider, EDI MEJAIS, within 7 days for hospital follow- up.  The following labs/tests are recommended: cbc/bmp/CXR.    Follow up with Dr. Cummins from pulmonology in 1 month, call to schedule appointment at [phone]     Full Code     Diet   Follow this diet upon discharge: Orders Placed This Encounter     Regular Diet Adult       Discharge Medications   Discharge Medication List as of 6/6/2017  5:32 PM      START taking these medications    Details   levofloxacin  (LEVAQUIN) 500 MG tablet Take 1 tablet (500 mg) by mouth daily for 6 days, Disp-6 tablet, R-0, E-Prescribe         CONTINUE these medications which have NOT CHANGED    Details   BENZONATATE PO Take 100 mg by mouth 3 times daily as needed for cough, Historical      OXYCODONE HCL PO Take 5 mg by mouth every 4 hours as needed, Historical      PANTOPRAZOLE SODIUM PO Take 40 mg by mouth 2 times daily (before meals), Historical      amphetamine-dextroamphetamine (ADDERALL XR) 10 MG per capsule 10 mg daily , Historical      amphetamine-dextroamphetamine (ADDERALL XR) 30 MG per capsule Take 30 mg by mouth daily , Historical      escitalopram (LEXAPRO) 10 MG tablet Take 10 mg by mouth daily , Historical           Allergies   No Known Allergies  Data   Most Recent 3 CBC's:  Recent Labs   Lab Test  06/06/17   0807  06/05/17   0801  06/04/17   0744   WBC  6.6  8.0  11.7*   HGB  10.7*  10.3*  10.6*   MCV  85  85  86   PLT  508*  451*  Canceled, Test credited   Duplicate request    398      Most Recent 3 BMP's:  Recent Labs   Lab Test  06/06/17   0807  06/05/17   0801  06/04/17   0744   NA  141  139  142   POTASSIUM  3.7  3.8  4.0   CHLORIDE  107  105  110*   CO2  26  26  25   BUN  4*  5*  4*   CR  0.60  0.52  0.47*   ANIONGAP  8  8  7   ASHLY  8.7  8.6  8.2*   GLC  100*  103*  108*     Most Recent 2 LFT's:  Recent Labs   Lab Test  06/03/17   1945  06/02/17   0021   AST  42  39   ALT  56*  56*   ALKPHOS  115  100   BILITOTAL  0.4  0.4     Most Recent INR's and Anticoagulation Dosing History:  Anticoagulation Dose History     Recent Dosing and Labs Latest Ref Rng & Units 6/4/2017    INR 0.86 - 1.14 1.25(H)        Most Recent 3 Troponin's:  Recent Labs   Lab Test  06/02/17   0021   TROPI  <0.015  The 99th percentile for upper reference range is 0.045 ug/L.  Troponin values in   the range of 0.045 - 0.120 ug/L may be associated with risks of adverse   clinical events.       Most Recent Cholesterol Panel:No lab results found.  Most  Recent 6 Bacteria Isolates From Any Culture (See EPIC Reports for Culture Details):  Recent Labs   Lab Test  06/04/17   0900  06/03/17   1950  06/03/17   1945  06/02/17   0130   CULT  Light growth Normal jace  No growth after 5 days  No growth after 5 days  10,000 to 50,000 colonies/mL mixed urogenital jace Susceptibility testing not   routinely done       Most Recent TSH, T4 and A1c Labs:No lab results found.  Results for orders placed or performed during the hospital encounter of 06/03/17   Chest CT, IV contrast only - PE protocol    Narrative    CT CHEST PULMONARY EMBOLISM WITH CONTRAST  6/3/2017 9:48 PM    HISTORY:  SOB, pleuritic CP, elevated D-dimer.    TECHNIQUE: Scans obtained from the apices through the diaphragm with  IV contrast. 76 mL Isovue 370 injected. Radiation dose for this scan  was reduced using automated exposure control, adjustment of the mA  and/or kV according to patient size, or iterative reconstruction  technique.    COMPARISON:  None.    FINDINGS:  No acute thoracic aortic abnormality. No evidence for  pulmonary embolism. Small to moderate right pleural effusion.  Prominent right chest base atelectasis. Cannot exclude airspace  disease at the underlying right lower lobe and right middle lobe.  There is some atelectasis that is mild at the left lung base as well.  Upper abdomen images are negative.      Impression    IMPRESSION:  1. No pulmonary embolism or acute thoracic aortic abnormality.  2. Small to moderate right pleural effusion. Prominent right base  atelectasis.  3. Airspace disease at the right chest base not entirely excluded.    GINA HORN MD   US Thoracentesis    Narrative    ULTRASOUND GUIDED THORACENTESIS  6/5/2017 9:09 AM     HISTORY: Right-sided chest pain.    FINDINGS: Ultrasound was used to evaluate for the presence and best  approach for drainage of a pleural effusion. Written and oral informed  consent was obtained. A pause for the cause procedure to verify  the  correct patient and correct procedure. The skin overlying the right  chest posteriorly was prepped and draped in the usual sterile fashion.  The subcutaneous tissues were anesthetized with 9 mL 1% lidocaine. A  catheter was advanced into the pleural space and 450 mL of  delia  colored fluid was drained. Patient was monitored by nurse under my  direct supervision throughout the exam. Ultrasound images were  permanently stored.  There were no immediate complications. Patient  left the ultrasound suite in satisfactory condition.      Impression    IMPRESSION: Technically successful thoracentesis without immediate  complications.    LACIE WARREN MD   XR Chest 1 View    Narrative    XR CHEST 1 VW 6/5/2017 9:05 AM     HISTORY: Right thoracentesis    COMPARISON: None      Impression    IMPRESSION: No evidence of pneumothorax following thoracentesis. There  is atelectasis or infiltrate in the right lung base. The left lung is  clear.    DANIELA SNELL MD

## 2017-06-09 LAB
BACTERIA SPEC CULT: NO GROWTH
BACTERIA SPEC CULT: NO GROWTH
Lab: NORMAL
Lab: NORMAL
MICRO REPORT STATUS: NORMAL
MICRO REPORT STATUS: NORMAL
SPECIMEN SOURCE: NORMAL
SPECIMEN SOURCE: NORMAL

## 2020-10-14 ENCOUNTER — APPOINTMENT (OUTPATIENT)
Dept: URBAN - METROPOLITAN AREA CLINIC 260 | Age: 30
Setting detail: DERMATOLOGY
End: 2020-10-14

## 2020-10-14 VITALS — HEIGHT: 63 IN | WEIGHT: 215 LBS

## 2020-10-14 DIAGNOSIS — L73.2 HIDRADENITIS SUPPURATIVA: ICD-10-CM

## 2020-10-14 PROCEDURE — OTHER ORDER TESTS: OTHER

## 2020-10-14 PROCEDURE — 99202 OFFICE O/P NEW SF 15 MIN: CPT | Mod: 25

## 2020-10-14 PROCEDURE — OTHER COUNSELING: OTHER

## 2020-10-14 PROCEDURE — OTHER ADDITIONAL NOTES: OTHER

## 2020-10-14 PROCEDURE — OTHER VENIPUNCTURE: OTHER

## 2020-10-14 PROCEDURE — OTHER HUMIRA INITIATION: OTHER

## 2020-10-14 PROCEDURE — OTHER PRESCRIPTION: OTHER

## 2020-10-14 PROCEDURE — 36415 COLL VENOUS BLD VENIPUNCTURE: CPT

## 2020-10-14 RX ORDER — DOXYCYCLINE 100 MG/1
100 MG CAPSULE ORAL BID
Qty: 14 | Refills: 1 | Status: ERX | COMMUNITY
Start: 2020-10-14

## 2020-10-14 RX ORDER — ADALIMUMAB 40MG/0.4ML
40MG KIT SUBCUTANEOUS
Qty: 2 | Refills: 0 | Status: ERX

## 2020-10-14 RX ORDER — CLINDAMYCIN PHOSPHATE 10 MG/G
1% GEL TOPICAL BID
Qty: 1 | Refills: 0 | Status: ERX | COMMUNITY
Start: 2020-10-14

## 2020-10-14 RX ORDER — ADALIMUMAB 40MG/0.4ML
KIT SUBCUTANEOUS
Qty: 3 | Refills: 1 | Status: ERX | COMMUNITY
Start: 2020-10-14

## 2020-10-14 ASSESSMENT — LOCATION DETAILED DESCRIPTION DERM
LOCATION DETAILED: LEFT BUTTOCK
LOCATION DETAILED: XIPHOID
LOCATION DETAILED: RIGHT BUTTOCK
LOCATION DETAILED: RIGHT AXILLARY VAULT
LOCATION DETAILED: RIGHT ANTERIOR PROXIMAL THIGH
LOCATION DETAILED: LEFT AXILLARY VAULT
LOCATION DETAILED: RIGHT ANTECUBITAL SKIN
LOCATION DETAILED: LEFT ANTERIOR PROXIMAL THIGH

## 2020-10-14 ASSESSMENT — LOCATION SIMPLE DESCRIPTION DERM
LOCATION SIMPLE: LEFT THIGH
LOCATION SIMPLE: ABDOMEN
LOCATION SIMPLE: RIGHT AXILLARY VAULT
LOCATION SIMPLE: LEFT BUTTOCK
LOCATION SIMPLE: LEFT AXILLARY VAULT
LOCATION SIMPLE: RIGHT UPPER ARM
LOCATION SIMPLE: RIGHT BUTTOCK
LOCATION SIMPLE: RIGHT THIGH

## 2020-10-14 ASSESSMENT — LOCATION ZONE DERM
LOCATION ZONE: LEG
LOCATION ZONE: AXILLAE
LOCATION ZONE: ARM
LOCATION ZONE: TRUNK

## 2020-10-14 ASSESSMENT — HURLEY STAGE
IN YOUR EXPERIENCE, AMONG ALL PATIENTS YOU HAVE SEEN WITH THIS CONDITION, HOW SEVERE IS THIS PATIENT'S CONDITION?: HURLEY STAGE III: MULTIPLE INTERCONNECTED SINUS TRACTS AND ABSCESSES THROUGHOUT AN ENTIRE AREA

## 2020-10-14 NOTE — PROCEDURE: VENIPUNCTURE
Detail Level: None
Number Of Tubes Drawn: 3
Venipuncture Paragraph: An alcohol pad was applied to the venipuncture site. Venipuncture was performed using a butterfly needle. Pressure and a bandaid was applied to the site. No complications were noted.
Bill For Individual Tests Below?: no

## 2020-10-14 NOTE — PROCEDURE: HUMIRA INITIATION
Add Pregnancy And Lactation Warning To Medication Counseling?: No
Detail Level: Zone
Humira Dosing: 160 mg SC day 1, 80 mg SC day 14, then 40 mg SC every other week starting on day 29
Pregnancy And Lactation Warning Text: This medication is Pregnancy Category B and is considered safe during pregnancy. It is unknown if this medication is excreted in breast milk.
Diagnosis (Required): Hidradenitis Suppurativa
Humira Monitoring Guidelines: - Discussed that a panel of labs need to be drawn at baseline and monitored periodically. \\n- Additionally, the patient will need to be screened for tuberculosis and Hepatitis B annually including at baseline.\\n- It is important to not take drug holidays unless otherwise instructed as this can affect a patient's ability to recapture the same degree of skin clearance upon restarting.

## 2021-01-11 RX ORDER — ADALIMUMAB 40MG/0.4ML
KIT SUBCUTANEOUS
Qty: 2 | Refills: 1 | Status: ERX

## 2021-01-12 ENCOUNTER — APPOINTMENT (OUTPATIENT)
Dept: URBAN - METROPOLITAN AREA CLINIC 260 | Age: 31
Setting detail: DERMATOLOGY
End: 2021-01-13

## 2021-01-12 ENCOUNTER — RX ONLY (RX ONLY)
Age: 31
End: 2021-01-12

## 2021-01-12 VITALS — WEIGHT: 230 LBS | HEIGHT: 63 IN

## 2021-01-12 DIAGNOSIS — L73.2 HIDRADENITIS SUPPURATIVA: ICD-10-CM

## 2021-01-12 DIAGNOSIS — L81.4 OTHER MELANIN HYPERPIGMENTATION: ICD-10-CM

## 2021-01-12 PROCEDURE — OTHER PRESCRIPTION: OTHER

## 2021-01-12 PROCEDURE — OTHER VENIPUNCTURE: OTHER

## 2021-01-12 PROCEDURE — OTHER ORDER TESTS: OTHER

## 2021-01-12 PROCEDURE — OTHER HUMIRA MONITORING: OTHER

## 2021-01-12 PROCEDURE — 36415 COLL VENOUS BLD VENIPUNCTURE: CPT

## 2021-01-12 PROCEDURE — OTHER ADDITIONAL NOTES: OTHER

## 2021-01-12 PROCEDURE — 99214 OFFICE O/P EST MOD 30 MIN: CPT | Mod: 25

## 2021-01-12 PROCEDURE — OTHER COUNSELING: OTHER

## 2021-01-12 RX ORDER — HYDROQUINONE 4 %
4% CREAM (GRAM) TOPICAL BID
Qty: 1 | Refills: 2 | Status: ERX | COMMUNITY
Start: 2021-01-12

## 2021-01-12 RX ORDER — ADALIMUMAB 40MG/0.4ML
KIT SUBCUTANEOUS
Qty: 3 | Refills: 1 | Status: ERX | COMMUNITY
Start: 2021-01-12

## 2021-01-12 RX ORDER — AZELAIC ACID 0.15 G/G
15% GEL TOPICAL BID
Qty: 1 | Refills: 2 | Status: ERX | COMMUNITY
Start: 2021-01-12

## 2021-01-12 RX ORDER — ADALIMUMAB 40MG/0.4ML
40MG KIT SUBCUTANEOUS
Qty: 6 | Refills: 1 | Status: ERX

## 2021-01-12 ASSESSMENT — LOCATION SIMPLE DESCRIPTION DERM
LOCATION SIMPLE: LEFT POSTERIOR THIGH
LOCATION SIMPLE: RIGHT POSTERIOR THIGH
LOCATION SIMPLE: RIGHT HAND

## 2021-01-12 ASSESSMENT — LOCATION ZONE DERM
LOCATION ZONE: LEG
LOCATION ZONE: HAND

## 2021-01-12 ASSESSMENT — LOCATION DETAILED DESCRIPTION DERM
LOCATION DETAILED: LEFT PROXIMAL POSTERIOR THIGH
LOCATION DETAILED: RIGHT PROXIMAL POSTERIOR THIGH
LOCATION DETAILED: RIGHT ULNAR DORSAL HAND

## 2021-01-12 NOTE — PROCEDURE: HUMIRA MONITORING
Latest Hbv (Optional): 10/14/2020
Detail Level: Zone
Patient Reported Weight(Optional But Include Units): 230
Length Of Therapy: 1 month
Comments: Recommend a re-initiation as patient had a break in treatment due to insurance change.
Add High Risk Medication Management Associated Diagnosis?: Yes

## 2021-01-12 NOTE — PROCEDURE: ADDITIONAL NOTES
Render Risk Assessment In Note?: no
Additional Notes: Recommend The Ordinary Niacinamide + Zinc, Hydroquinone or Azelaic Acid. GoodRx card given.
Detail Level: Detailed

## 2021-01-12 NOTE — HPI: RASH (HIDRADENITIS SUPPURATIVA)
How Severe Is It?: moderate
Is This A New Presentation, Or A Follow-Up?: Follow Up Hidradenitis Suppurativa
Additional History: She was on humira for a mo and it got better, she wasn’t  getting any new cysts. She lost her insurance and had to stop. Now she has insurance again and would like to go back on.

## 2021-09-16 ENCOUNTER — APPOINTMENT (OUTPATIENT)
Dept: URBAN - METROPOLITAN AREA CLINIC 260 | Age: 31
Setting detail: DERMATOLOGY
End: 2021-09-17

## 2021-09-16 VITALS — WEIGHT: 230 LBS | HEIGHT: 63 IN | RESPIRATION RATE: 16 BRPM

## 2021-09-16 DIAGNOSIS — L73.2 HIDRADENITIS SUPPURATIVA: ICD-10-CM

## 2021-09-16 PROCEDURE — OTHER ORDER TESTS: OTHER

## 2021-09-16 PROCEDURE — 11901 INJECT SKIN LESIONS >7: CPT

## 2021-09-16 PROCEDURE — OTHER PRESCRIPTION: OTHER

## 2021-09-16 PROCEDURE — OTHER PRESCRIPTION MEDICATION MANAGEMENT: OTHER

## 2021-09-16 PROCEDURE — OTHER HUMIRA MONITORING: OTHER

## 2021-09-16 PROCEDURE — 99214 OFFICE O/P EST MOD 30 MIN: CPT | Mod: 25

## 2021-09-16 PROCEDURE — OTHER INTRALESIONAL KENALOG: OTHER

## 2021-09-16 PROCEDURE — OTHER VENIPUNCTURE: OTHER

## 2021-09-16 PROCEDURE — 36415 COLL VENOUS BLD VENIPUNCTURE: CPT

## 2021-09-16 RX ORDER — ADALIMUMAB 80MG/0.8ML
KIT SUBCUTANEOUS
Qty: 6 | Refills: 3 | Status: ERX | COMMUNITY
Start: 2021-09-16

## 2021-09-16 ASSESSMENT — LOCATION SIMPLE DESCRIPTION DERM
LOCATION SIMPLE: GROIN
LOCATION SIMPLE: RIGHT AXILLARY VAULT
LOCATION SIMPLE: LEFT THIGH
LOCATION SIMPLE: RIGHT THIGH
LOCATION SIMPLE: RIGHT HAND

## 2021-09-16 ASSESSMENT — LOCATION ZONE DERM
LOCATION ZONE: LEG
LOCATION ZONE: AXILLAE
LOCATION ZONE: HAND
LOCATION ZONE: TRUNK

## 2021-09-16 ASSESSMENT — LOCATION DETAILED DESCRIPTION DERM
LOCATION DETAILED: LEFT SUPRAPUBIC SKIN
LOCATION DETAILED: LEFT ANTERIOR PROXIMAL THIGH
LOCATION DETAILED: RIGHT ULNAR DORSAL HAND
LOCATION DETAILED: RIGHT ANTERIOR MEDIAL PROXIMAL THIGH
LOCATION DETAILED: RIGHT ANTERIOR PROXIMAL THIGH
LOCATION DETAILED: RIGHT AXILLARY VAULT

## 2021-09-16 NOTE — PROCEDURE: HUMIRA MONITORING
Comments: Will resend a new prescription into pharmacy. She has been out for 2 wks. Will do injections today to help relieve some pain
Latest Quantiferon Gold (Optional): 10/14/2020
Length Of Therapy: 1 month
Detail Level: Zone
Add High Risk Medication Management Associated Diagnosis?: Yes
Patient Reported Weight(Optional But Include Units): 230

## 2021-09-16 NOTE — PROCEDURE: INTRALESIONAL KENALOG
Detail Level: Detailed
Total Volume Injected (Ccs- Only Use Numbers And Decimals): .47
Concentration Of Solution Injected (Mg/Ml): 10.0
Consent: The risks of atrophy were reviewed with the patient.
Medical Necessity Clause: This procedure was medically necessary because the lesions that were treated were:
X Size Of Lesion In Cm (Optional): 0
Validate Note Data When Using Inventory: Yes
Kenalog Preparation: Kenalog
Include Z78.9 (Other Specified Conditions Influencing Health Status) As An Associated Diagnosis?: No
Administered By (Optional): PATRICIA

## 2021-09-16 NOTE — PROCEDURE: PRESCRIPTION MEDICATION MANAGEMENT
Plan: Patient will return tomorrow for a nurses visit lab draw for Humira.
Render In Strict Bullet Format?: No
Detail Level: Zone

## 2021-09-16 NOTE — HPI: RASH (HIDRADENITIS SUPPURATIVA)
How Severe Is It?: severe
Is This A New Presentation, Or A Follow-Up?: Follow Up Hidradenitis Suppurativa
Additional History: 2-3 a month while on humira. No increased cold or flu symptoms

## 2021-09-17 ENCOUNTER — APPOINTMENT (OUTPATIENT)
Dept: URBAN - METROPOLITAN AREA CLINIC 260 | Age: 31
Setting detail: DERMATOLOGY
End: 2021-09-17

## 2021-09-17 DIAGNOSIS — L73.2 HIDRADENITIS SUPPURATIVA: ICD-10-CM

## 2021-09-17 PROCEDURE — 36415 COLL VENOUS BLD VENIPUNCTURE: CPT

## 2021-09-17 PROCEDURE — OTHER ADDITIONAL NOTES: OTHER

## 2021-09-17 PROCEDURE — OTHER ORDER TESTS: OTHER

## 2021-09-17 PROCEDURE — OTHER VENIPUNCTURE: OTHER

## 2021-09-17 NOTE — PROCEDURE: ADDITIONAL NOTES
Detail Level: Detailed
Additional Notes: Could not get CBC at this time. EF states we will draw that next office visit.
Render Risk Assessment In Note?: no

## 2021-09-17 NOTE — PROCEDURE: VENIPUNCTURE
Detail Level: None
Venipuncture Paragraph: An alcohol pad was applied to the venipuncture site. Venipuncture was performed using a butterfly needle. Pressure and a bandaid was applied to the site. No complications were noted.
Number Of Tubes Drawn: 2
Bill 67550 For Specimen Handling/Conveyance To Laboratory?: no

## 2021-09-21 RX ORDER — ADALIMUMAB 80MG/0.8ML
KIT SUBCUTANEOUS
Qty: 6 | Refills: 3 | Status: ERX

## 2021-12-22 ENCOUNTER — TELEPHONE (OUTPATIENT)
Dept: DERMATOLOGY | Facility: CLINIC | Age: 31
End: 2021-12-22
Payer: COMMERCIAL

## 2021-12-22 NOTE — TELEPHONE ENCOUNTER
[facility] Call Center    Phone Message    May a detailed message be left on voicemail: yes     Reason for Call: Other: Pt states she was seen at [facility] in [place] and was told she has HS but states they don't have expertise with that diagnosis. Pt would like to be seen by Dr. Baum but does not have a referral. Please call Pt back to schedule. Thank you.      Action Taken: Message routed to:  [facility] Derm    Travel Screening: Not Applicable

## 2022-01-31 RX ORDER — ADALIMUMAB 80MG/0.8ML
KIT SUBCUTANEOUS
Qty: 6 | Refills: 3 | Status: ERX

## 2022-02-10 ENCOUNTER — APPOINTMENT (OUTPATIENT)
Dept: URBAN - METROPOLITAN AREA CLINIC 260 | Age: 32
Setting detail: DERMATOLOGY
End: 2022-02-16

## 2022-02-10 VITALS — WEIGHT: 230 LBS | HEIGHT: 63 IN

## 2022-02-10 DIAGNOSIS — Z79.899 OTHER LONG TERM (CURRENT) DRUG THERAPY: ICD-10-CM

## 2022-02-10 DIAGNOSIS — L73.2 HIDRADENITIS SUPPURATIVA: ICD-10-CM

## 2022-02-10 PROCEDURE — 99213 OFFICE O/P EST LOW 20 MIN: CPT | Mod: 25

## 2022-02-10 PROCEDURE — OTHER VENIPUNCTURE: OTHER

## 2022-02-10 PROCEDURE — OTHER HUMIRA MONITORING: OTHER

## 2022-02-10 PROCEDURE — OTHER HIGH RISK MEDICATION MONITORING: OTHER

## 2022-02-10 PROCEDURE — OTHER MIPS QUALITY: OTHER

## 2022-02-10 PROCEDURE — OTHER PRESCRIPTION MEDICATION MANAGEMENT: OTHER

## 2022-02-10 PROCEDURE — OTHER ORDER TESTS: OTHER

## 2022-02-10 PROCEDURE — 36415 COLL VENOUS BLD VENIPUNCTURE: CPT

## 2022-02-10 PROCEDURE — OTHER PRESCRIPTION: OTHER

## 2022-02-10 PROCEDURE — OTHER COUNSELING: OTHER

## 2022-02-10 RX ORDER — ADALIMUMAB 80MG/0.8ML
80MG KIT SUBCUTANEOUS EVERY OTHER WEEK
Qty: 1 | Refills: 6 | COMMUNITY
Start: 2022-02-10

## 2022-02-10 RX ORDER — SPIRONOLACTONE 50 MG/1
TABLET, FILM COATED ORAL BID
Qty: 60 | Refills: 2 | Status: ERX | COMMUNITY
Start: 2022-02-10

## 2022-02-10 ASSESSMENT — LOCATION ZONE DERM
LOCATION ZONE: ARM
LOCATION ZONE: TRUNK
LOCATION ZONE: LEG
LOCATION ZONE: VULVA

## 2022-02-10 ASSESSMENT — LOCATION SIMPLE DESCRIPTION DERM
LOCATION SIMPLE: LEFT THIGH
LOCATION SIMPLE: GROIN
LOCATION SIMPLE: RIGHT THIGH
LOCATION SIMPLE: LEFT UPPER ARM
LOCATION SIMPLE: LEFT BREAST
LOCATION SIMPLE: RIGHT BREAST

## 2022-02-10 ASSESSMENT — LOCATION DETAILED DESCRIPTION DERM
LOCATION DETAILED: LEFT ANTECUBITAL SKIN
LOCATION DETAILED: RIGHT MEDIAL BREAST 5-6:00 REGION
LOCATION DETAILED: LEFT ANTERIOR PROXIMAL THIGH
LOCATION DETAILED: LEFT MEDIAL BREAST 7-8:00 REGION
LOCATION DETAILED: MONS PUBIS
LOCATION DETAILED: RIGHT ANTERIOR PROXIMAL THIGH

## 2022-02-10 ASSESSMENT — HURLEY STAGE
IN YOUR EXPERIENCE, AMONG ALL PATIENTS YOU HAVE SEEN WITH THIS CONDITION, HOW SEVERE IS THIS PATIENT'S CONDITION?: HURLEY STAGE II: SINGLE OR MULTIPLE, WIDELY SEPARATED RECURRENT ABSCESSES WITH SINUS TRACT FORMATION AND SCARRING

## 2022-02-10 NOTE — PROCEDURE: HIGH RISK MEDICATION MONITORING
Detail Level: Zone Topical Sulfur Applications Counseling: Topical Sulfur Counseling: Patient counseled that this medication may cause skin irritation or allergic reactions.  In the event of skin irritation, the patient was advised to reduce the amount of the drug applied or use it less frequently.   The patient verbalized understanding of the proper use and possible adverse effects of topical sulfur application.  All of the patient's questions and concerns were addressed.

## 2022-02-10 NOTE — PROCEDURE: HIGH RISK MEDICATION MONITORING
CERTIFICATE OF RETURN TO WORK    July 27, 2018      Re:   Luis Kerr  [address] [facility] [address]                        This is to certify that Luis Kerr was seen today, 7/27/2018, for an appointment and is unable to return to work until reevaluation on 7/31/2018.    RESTRICTIONS: no work      REMARKS: follow up on 7/31/2018        SIGNATURE:___________________________________________,   7/27/2018      Yuri Humphrey MD           Orthopaedics  [address]  [address]  [phone]       Colchicine Counseling:  Patient counseled regarding adverse effects including but not limited to stomach upset (nausea, vomiting, stomach pain, or diarrhea).  Patient instructed to limit alcohol consumption while taking this medication.  Colchicine may reduce blood counts especially with prolonged use.  The patient understands that monitoring of kidney function and blood counts may be required, especially at baseline. The patient verbalized understanding of the proper use and possible adverse effects of colchicine.  All of the patient's questions and concerns were addressed.

## 2022-02-10 NOTE — PROCEDURE: HIGH RISK MEDICATION MONITORING
Xelmanishaz Pregnancy And Lactation Text: This medication is Pregnancy Category D and is not considered safe during pregnancy.  The risk during breast feeding is also uncertain.

## 2022-02-10 NOTE — HPI: RASH (HIDRADENITIS SUPPURATIVA)
How Severe Is It?: moderate
Is This A New Presentation, Or A Follow-Up?: Follow Up Hidradenitis Suppurativa
Additional History: The groin area has improved from getting 2-3 cysts a week to 2-3 cysts a month. She is happy to be on Humira and wants to stay on it as her HS is improving. She is developing a couple cysts in new areas. She is Getting 3-5 new cysts under the bra a week. She would like to stay on Humira but wants to add something else in to help manage this.

## 2022-02-10 NOTE — PROCEDURE: HIGH RISK MEDICATION MONITORING
Otezla Pregnancy And Lactation Text: This medication is Pregnancy Category C and it isn't known if it is safe during pregnancy. It is unknown if it is excreted in breast milk. Joselin Merchant  (RN)  2020 05:01:20 Joselin Merchant  (RN)  2020 05:02:42

## 2022-02-10 NOTE — PROCEDURE: PRESCRIPTION MEDICATION MANAGEMENT
Detail Level: Zone
Continue Regimen: Humira injections qoweek
Modify Regimen: Patient gets 3-5 cysts a week. Added Spironolactone today to her regimen to help with the new cysts she is getting on her breasts as these appear to be more hormonal.
Plan: Follow up in 2 months. Discussed insurance not approving her medication and we will talk to them to see if we can get this covered again as patient is doing well and wants to continue taking the medication due to improvement in her HS condition
Render In Strict Bullet Format?: No
Initiate Treatment: Spironolactone 50 mg bid

## 2022-02-10 NOTE — PROCEDURE: MIPS QUALITY
Quality 130: Documentation Of Current Medications In The Medical Record: Current Medications Documented
Quality 110: Preventive Care And Screening: Influenza Immunization: Influenza Immunization Administered during Influenza season
Quality 226: Preventive Care And Screening: Tobacco Use: Screening And Cessation Intervention: Patient screened for tobacco use and is an ex/non-smoker
Quality 431: Preventive Care And Screening: Unhealthy Alcohol Use - Screening: Patient not identified as an unhealthy alcohol user when screened for unhealthy alcohol use using a systematic screening method
Quality 337: Tuberculosis Prevention For Psoriasis And Psoriatic Arthritis Patients On A Biological Immune Response Modifier: Patient has a documented negative TB screening prior to treatment.
Detail Level: Detailed

## 2022-03-02 RX ORDER — ADALIMUMAB 80MG/0.8ML
KIT SUBCUTANEOUS EVERY OTHER WEEK
Qty: 1 | Refills: 6 | Status: ERX

## 2022-04-13 ENCOUNTER — APPOINTMENT (OUTPATIENT)
Dept: URBAN - METROPOLITAN AREA CLINIC 260 | Age: 32
Setting detail: DERMATOLOGY
End: 2022-04-13

## 2022-04-13 VITALS — HEIGHT: 63 IN | WEIGHT: 240 LBS

## 2022-04-13 DIAGNOSIS — L73.2 HIDRADENITIS SUPPURATIVA: ICD-10-CM

## 2022-04-13 DIAGNOSIS — Z79.899 OTHER LONG TERM (CURRENT) DRUG THERAPY: ICD-10-CM

## 2022-04-13 PROCEDURE — OTHER HUMIRA MONITORING: OTHER

## 2022-04-13 PROCEDURE — OTHER PRESCRIPTION MEDICATION MANAGEMENT: OTHER

## 2022-04-13 PROCEDURE — OTHER MIPS QUALITY: OTHER

## 2022-04-13 PROCEDURE — OTHER HIGH RISK MEDICATION MONITORING: OTHER

## 2022-04-13 PROCEDURE — OTHER COUNSELING: OTHER

## 2022-04-13 PROCEDURE — OTHER PRESCRIPTION: OTHER

## 2022-04-13 PROCEDURE — 99213 OFFICE O/P EST LOW 20 MIN: CPT

## 2022-04-13 RX ORDER — SPIRONOLACTONE 50 MG/1
TABLET, FILM COATED ORAL BID
Qty: 270 | Refills: 1 | Status: ERX

## 2022-04-13 RX ORDER — HYDROQUINONE 4 %
CREAM (GRAM) TOPICAL
Qty: 30 | Refills: 0 | Status: ERX

## 2022-04-13 ASSESSMENT — LOCATION ZONE DERM
LOCATION ZONE: VULVA
LOCATION ZONE: LEG
LOCATION ZONE: TRUNK

## 2022-04-13 ASSESSMENT — LOCATION DETAILED DESCRIPTION DERM
LOCATION DETAILED: RIGHT MEDIAL BREAST 5-6:00 REGION
LOCATION DETAILED: RIGHT ANTERIOR PROXIMAL THIGH
LOCATION DETAILED: LEFT MEDIAL BREAST 7-8:00 REGION
LOCATION DETAILED: MONS PUBIS
LOCATION DETAILED: LEFT ANTERIOR PROXIMAL THIGH

## 2022-04-13 ASSESSMENT — LOCATION SIMPLE DESCRIPTION DERM
LOCATION SIMPLE: RIGHT THIGH
LOCATION SIMPLE: LEFT THIGH
LOCATION SIMPLE: RIGHT BREAST
LOCATION SIMPLE: LEFT BREAST
LOCATION SIMPLE: GROIN

## 2022-04-13 NOTE — PROCEDURE: PRESCRIPTION MEDICATION MANAGEMENT
Render In Strict Bullet Format?: No
Modify Regimen: Increase Spironolactone dose to 150mg a day
Continue Regimen: Humira
Initiate Treatment: Hydroquinone
Detail Level: Zone
Plan: Follow up in two months if not adequately controlled or six months if things are stable

## 2022-04-13 NOTE — HPI: RASH (HIDRADENITIS SUPPURATIVA)
Is This A New Presentation, Or A Follow-Up?: Follow Up Hidradenitis Suppurativa
Additional History: She is getting less breakouts and they are now less painful.\\nPrior to starting humira she was getting one a day. Now on humira she gets one a week. With the spironolactone she feels that they are even less painful. Sometimes she doesn’t even realize that she had a cyst until she sees blood and then realizes that there was a cyst there.\\nShe is still getting more cysts on her breast but the cysts on her groin have lessened

## 2022-04-13 NOTE — PROCEDURE: MIPS QUALITY
Quality 130: Documentation Of Current Medications In The Medical Record: Current Medications Documented
Quality 337: Tuberculosis Prevention For Psoriasis And Psoriatic Arthritis Patients On A Biological Immune Response Modifier: Patient has a documented negative TB screening prior to treatment.
Quality 226: Preventive Care And Screening: Tobacco Use: Screening And Cessation Intervention: Patient screened for tobacco use and is an ex/non-smoker
Quality 110: Preventive Care And Screening: Influenza Immunization: Influenza Immunization Administered during Influenza season
Quality 431: Preventive Care And Screening: Unhealthy Alcohol Use - Screening: Patient not identified as an unhealthy alcohol user when screened for unhealthy alcohol use using a systematic screening method
Detail Level: Detailed

## 2022-04-13 NOTE — PROCEDURE: HIGH RISK MEDICATION MONITORING
Refill request received via interface from pharmacy.    Pt was last seen by PCP 3/6/2019  Pt does not have a pending appointment with PCP  Pt was instructed to RTC in 3 month  Last glycohemoglobin was 3/6/2019 6.3    Rifampin Pregnancy And Lactation Text: This medication is Pregnancy Category C and it isn't know if it is safe during pregnancy. It is also excreted in breast milk and should not be used if you are breast feeding.

## 2022-07-11 ENCOUNTER — TELEPHONE (OUTPATIENT)
Dept: DERMATOLOGY | Facility: CLINIC | Age: 32
End: 2022-07-11

## 2022-07-11 NOTE — TELEPHONE ENCOUNTER
Patient 9/8/22 appointment with Dr. Baum had to be reschedule. Patient stated she's had that appt since 12/21. I sent msg to  and Hussein chaney to advise on date and time for another appointment. Patient didn't want to see another Provider.

## 2022-07-28 ENCOUNTER — TELEPHONE (OUTPATIENT)
Dept: DERMATOLOGY | Facility: CLINIC | Age: 32
End: 2022-07-28

## 2022-07-28 NOTE — TELEPHONE ENCOUNTER
Humble and sent letter informing patient that a new appointment with Dr. Baum had been scheduled for her.

## 2022-09-15 ENCOUNTER — OFFICE VISIT (OUTPATIENT)
Dept: DERMATOLOGY | Facility: CLINIC | Age: 32
End: 2022-09-15
Payer: COMMERCIAL

## 2022-09-15 VITALS — SYSTOLIC BLOOD PRESSURE: 121 MMHG | DIASTOLIC BLOOD PRESSURE: 80 MMHG

## 2022-09-15 DIAGNOSIS — L73.2 HIDRADENITIS SUPPURATIVA: Primary | ICD-10-CM

## 2022-09-15 PROCEDURE — 99204 OFFICE O/P NEW MOD 45 MIN: CPT | Performed by: DERMATOLOGY

## 2022-09-15 RX ORDER — ADALIMUMAB 80MG/0.8ML
KIT SUBCUTANEOUS
COMMUNITY
Start: 2022-09-01 | End: 2023-05-25

## 2022-09-15 RX ORDER — SPIRONOLACTONE 50 MG/1
TABLET, FILM COATED ORAL
COMMUNITY
Start: 2022-07-08 | End: 2022-12-08

## 2022-09-15 RX ORDER — RESORCINOL
POWDER (GRAM) MISCELLANEOUS
Qty: 30 G | Refills: 4 | Status: SHIPPED | OUTPATIENT
Start: 2022-09-15 | End: 2023-06-08

## 2022-09-15 RX ORDER — DOXYCYCLINE 100 MG/1
100 CAPSULE ORAL 2 TIMES DAILY
Qty: 60 CAPSULE | Refills: 4 | Status: SHIPPED | OUTPATIENT
Start: 2022-09-15 | End: 2023-10-16

## 2022-09-15 RX ORDER — BUSPIRONE HYDROCHLORIDE 15 MG/1
TABLET ORAL
COMMUNITY
Start: 2022-08-29

## 2022-09-15 ASSESSMENT — PAIN SCALES - GENERAL: PAINLEVEL: NO PAIN (0)

## 2022-09-15 NOTE — LETTER
9/15/2022       RE: Dahlia Hayes  [address]  [facility] [address]     Dear Colleague,    Thank you for referring your patient, Dahlia Hayes, to the [facility] [place] at [facility]. Please see a copy of my visit note below.    Hidradenitis Suppurativa Clinic New Patient Note    Self-referred    Dermatology Problem List  1) Hidradenitis suppurativa  2) Acne  3) Ganglion cyst, left wrist    CC:  Chief Complaint   Patient presents with     Derm Problem     Dahlia is here to be seen for HS.     History of Present Illness  Dahlia Hayes is a 32 year old  female with the above noted PMH being seen today  for evaluation of HS.      It started in 2014 or 15. Had baby in 2011. Has progressively gotten worse.   Diagnosed In Oct 2020 at [facility]. Started on humira in 2020 and spironolactone 100mg qday, The combination helps but getting it still under breasts. Definitely a frictional component. Has IUD in since 2016.  May have gotten worse after it was placed. HUmira was never that helpful.  Spironolactone helps that acne but minimal help for HS. Thighs used to be the worst site and now it is affectingchest more.    2017 was admitted for sepsis, but no one could figure out what it was due to    Bowel movement twice a day     Screenings  - Depression/Anxiety: depression/anxiety  - Sexual dysfunction: stopped sex because of it  - Tobacco use: No tobacco  - PCOS: Prior irregular menses  - Obesity: +  - DMII or Insulin Resistance: No  - Hypertension: No  - Hyperlipidemia: No  - Inflammatory bowel disease: Neg colonoscopy 2018  - Spondyloarthropathy:  Left wrist, mosly at night when she has typed a lot.  No am stiffness.     + Marijuana helps pain    HS Nurse Assessment    Nurse Assessment Data 9/15/2022   Over the past 30 days how many old lesions flared back up? 3   Over the past 30 days how many new lesions did you  get? 4   Over the past week, how many dressing changes do you do each day? 2   Over the past week, has your wound drainage been: Mild   Rate your HS overall from 0 - 10 (0 = no disease, 10 = worst) over the past week:  6   Rate your pain score from 0 - 10 (0 = no disease, 10 = worst) for the most painful/symptomatic lesion in the past week:  5 - Moderate Pain   Over the past week, how much has HS influenced your quality of life? moderately     Detailed Review of Systems  Otherwise nml state of health. No other skin concerns.    Past Medical History:   Past Medical History:   Diagnosis Date     ADHD (attention deficit hyperactivity disorder)     adderall      Anxiety     lexapro  Dr. Carrillo PN      breast lumps 2015    benign on mammogram      Chronic tonsillitis        Medications:  Current Outpatient Medications   Medication     amphetamine-dextroamphetamine (ADDERALL XR) 10 MG per capsule     amphetamine-dextroamphetamine (ADDERALL XR) 30 MG per capsule     busPIRone (BUSPAR) 15 MG tablet     escitalopram (LEXAPRO) 10 MG tablet     HUMIRA PEN 80 MG/0.8ML pen kit     spironolactone (ALDACTONE) 50 MG tablet     BENZONATATE PO     OXYCODONE HCL PO     PANTOPRAZOLE SODIUM PO     No current facility-administered medications for this visit.     Allergies:   No Known Allergies    Social History  Social History     Socioeconomic History     Marital status: Single     Spouse name: Not on file     Number of children: 1     Years of education: Not on file     Highest education level: Not on file   Occupational History     Occupation: student      Comment: nursing school    Tobacco Use     Smoking status: Never Smoker     Smokeless tobacco: Never Used   Substance and Sexual Activity     Alcohol use: No     Alcohol/week: 0.0 standard drinks     Drug use: Yes     Types: Marijuana     Comment: once every couple weeks     Sexual activity: Yes     Partners: Male     Birth control/protection: Natural Family Planning   Other Topics  Concern     Parent/sibling w/ CABG, MI or angioplasty before 65F 55M? Not Asked   Social History Narrative     Not on file     Social Determinants of Health     Financial Resource Strain: Not on file   Food Insecurity: Not on file   Transportation Needs: Not on file   Physical Activity: Not on file   Stress: Not on file   Social Connections: Not on file   Intimate Partner Violence: Not on file   Housing Stability: Not on file     Family History:  Family History   Problem Relation Age of Onset     Anxiety Disorder Mother      Anxiety Disorder Maternal Grandmother      Anxiety Disorder Sister      Anxiety Disorder Brother      Substance Abuse Sister      Substance Abuse Brother      Mental Illness Mother      Mental Illness Brother      Depression Mother      Depression Maternal Grandmother      Thyroid Disease Mother      Physical Exam  /80   GEN: NAD, alert and appropriately responsive  SKIN:   HS Data  HS Exam Data 9/15/2022   LC Type LC1   Clinical Subtypes Regular type   Acne? Yes   Dissecting Cellulitis? No   Visual analogue score (0-100) 35   Total Inflammatory Nodules 9   Total Abcesses 1   Total Draining Tunnels 0   Total Abscess and Nodule Count 10   IHS4 Score  11   Total  HASI Score 10     Labs/Imaging: Reviewed    Assessment & Plan:  1) Hidradenitis suppurativa  - Moderative effect on QOL and severe by IHS4  - Limited improvement on humira  - Start doxycyline 100mg BID   - Ordered resorcinol cream BID for flares  - Referred for nd-yag  - Will check labs and switch to infliximab. Discussed irsks and benefits, including infusion reactions.    Thank you for referring Dahlia Hayes to the HS clinic    Karl Baum MD

## 2022-09-15 NOTE — PATIENT INSTRUCTIONS
PLAN  - Labs today   - Continue humira for now and stop one week before infliximab infusion  - Start doxycyline 100mg twice as day (watch out for stomach issues an light sensitivity)  - Continue spironolactone 100mg daily  - Letter sent to [place] for laser hair removal  - Resorcinol cream twicea day as needed for flares   Sent to: [place] [facility]  [address], [place], [place] [address] [phone]    Recommended immunizations  - Pneumonia vaccine  - Shingles vaccines  - COVID and COVID boosters x2  HIDRADENITIS SUPPURATIVA     What is hidradenitis suppurativa (HS)?  Hidradenitis suppurativa (HS) is a chronic skin disorder affecting the hair follicles. The disease starts with sore red lumps (or boils), typically involving the armpits, breasts, lower abdomen, groin, and buttocks. These lesions appear suddenly, increase in size and then burst or rupture, usually under the surface of the skin. This causes severe pain. Sometimes they rupture to the surface of the skin, draining pus. In some people, they can result in tunnels under the skin that may or may not continue to drain. When the lumps heal, they can leave scars.     Facts:   HS is a chronic skin disease, that comes and goes in flares, and is very painful  HS happens in many people - men and women, young and elderly, all races and ethnicities. But HS is more common in young adults, women  and skin of color  One out of three people with HS has a family member with HS   HS is NOT due to an infection or washing habits  HS is NOT contagious, so it cannot be spread from one person to another person   HS is NOT caused by how well you wash or what soaps you use  HS is NOT caused by smoking or obesity, but quitting smoking and losing weight have helped some people        Causes  The cause of HS remains unclear. It is thought that there is a problem in the hair follicle that makes it weaker and easier to break open. The current theory is that there  are three steps that cause an HS lesion to form::   The hair follicle gets plugged   The hair follicle swells and then ruptures, causing pain and inflammation   In some people, the inflammation does not stop and results in tunneling through the skin       Associated Conditions  HS is associated with several other medical conditions and activities including:  Tobacco use  High cholesterol, heart disease and stroke   Type 2 diabetes   Depression and anxiety   Arthritis, which causes stiffness and pain in joints   Inflammatory bowel disease (including Crohn's disease and ulcerative colitis)  Severe acne and pilonidal sinus/cyst  Polycystic ovarian syndrome  Skin cancer in areas of longstanding HS lesions, typically in the groin or buttocks (rare)    It is important to ask your physician to watch out for these conditions.      To help manage mental health issues related to HS and emotional support:  Help finding a mental health specialist: https://www.psychologyBetterment."[employer]/us/therapists  Suicide prevention (24/7 free confidential support):   Phone: (819) 259-8058  Website: https://suicidepreventionlifeline.org/    Support groups:  https://hopeforhs.org/  Intimacy support: American Association of Sexuality Educators, Counselors and Therapists (AASECT) website: https://www.aasect.org    For help quitting smoking   Phone:  English: 2-020-CYUE-NOW (1-124.580.6667)  Jamaican: 4-908-QFEFMM-DALY (1-864.896.3902)    Website:   English: https://smokefree.gov/  Jamaican: live.smokefree.gov     Lifestyle Modifications   Quitting smoking or weight loss can help your overall health and may help improve HS symptoms in some people, but not everyone.   It is recommended to eat a well-balanced, healthy diet (https://health.gov/dietaryguidelines) and to maintain a healthy weight. Avoiding dietary triggers can help some people suffering with HS, but will not necessarily help others with HS.    Some people may find that friction, irritation,  and rubbing may worsen HS symptoms. Some people do better with loose, breathable clothing and fabrics. Shaving close to the affected areas may also worsen HS in some people. But, not everyone has the same triggers for their HS, so see what works for you!    Some medications may worsen HS such as lithium, testosterone, and some progesterone-only birth control.  Please discuss this with your provider before stopping medication.     Zinc supplementation has been shown in some studies to help HS. However, every treatment can have a side effects,  so talk to your provider before starting any treatment.     Treatment                    Medicines, procedures and surgeries are offered to treat HS. Treatment can help reduce breakouts and improve quality of life.  Medicines used:  Topical washes (e.g. chlorhexidine, benzoyl peroxide)    Clindamycin on the skin - seems effective for pustules and papules. Probably not helpful for larger chronic lesions.     Oral antibiotics (e.g. doxycycline, clindamycin + rifampin, dapsone) - antibiotics may help some, but not all patients    Hormonal therapies (e.g. spironolactone, oral contraceptives) - primarily used in females though to have a hormonal component to their HS (e.g perimenstrual flares, worsening in pregnancy, polycystic ovarian syndrome)    Immunosuppression (e.g. prednisone)     Injectables (e.g. adalimumab, infliximab) - Adalimumab is the only federal drug administration (FDA) approved medication for HS    Procedures:  Intralesional steroid injections - may help areas of acute active inflammation     Some hair removal lasers - If considering this option, we recommend doing this only with a medical professional that has experience treating HS.     Surgeries:  Incision and drainage - Provides fast, short-term relief, but symptoms likely to recur.        Deroofing - This surgery involves opening the lesion and cleaning out the base. This treatment is effective for recurring  lesions.  Recurrence rates seem to be similar to excision. These are typically left open and allowed to heal on their own over 1-3 months      Excision - This involves cutting out chronic lesions.  This may entail cutting out a large affected area referred to as wide local excision, or cutting out single lesions, referred to as localized excisions.  Excision may be done with a scalpel, electric heating device or laser (e.g. carbon dioxide [CO2] laser).  These are either allowed to heal on their own, closed with sutures, or closed with a graft or flap.  Grafts are typically done by taking skin from one site of the body and using it to close another part of the body.  Flaps are done by moving tissue around to close a wound.     Check out this website to help decide the best treatment options for you!     https://www.informed-decisions.org/hidradenitispda.php

## 2022-09-15 NOTE — NURSING NOTE
Dermatology Rooming Note    Dahlia Hayes's goals for this visit include:   Chief Complaint   Patient presents with     Derm Problem     Dahlia is here to be seen for HS.     Wayne Lewis, EMT

## 2022-09-15 NOTE — PROGRESS NOTES
Hidradenitis Suppurativa Clinic New Patient Note    Self-referred    Dermatology Problem List  1) Hidradenitis suppurativa  2) Acne  3) Ganglion cyst, left wrist    CC:  Chief Complaint   Patient presents with     Derm Problem     Dahlia is here to be seen for HS.     History of Present Illness  Dahlia Hayes is a 32 year old  female with the above noted PMH being seen today  for evaluation of HS.      It started in 2014 or 15. Had baby in 2011. Has progressively gotten worse.   Diagnosed In Oct 2020 at [facility]. Started on humira in 2020 and spironolactone 100mg qday, The combination helps but getting it still under breasts. Definitely a frictional component. Has IUD in since 2016.  May have gotten worse after it was placed. HUmira was never that helpful.  Spironolactone helps that acne but minimal help for HS. Thighs used to be the worst site and now it is affectingchest more.    2017 was admitted for sepsis, but no one could figure out what it was due to    Bowel movement twice a day     Screenings  - Depression/Anxiety: depression/anxiety  - Sexual dysfunction: stopped sex because of it  - Tobacco use: No tobacco  - PCOS: Prior irregular menses  - Obesity: +  - DMII or Insulin Resistance: No  - Hypertension: No  - Hyperlipidemia: No  - Inflammatory bowel disease: Neg colonoscopy 2018  - Spondyloarthropathy:  Left wrist, mosly at night when she has typed a lot.  No am stiffness.     + Marijuana helps pain    HS Nurse Assessment    Nurse Assessment Data 9/15/2022   Over the past 30 days how many old lesions flared back up? 3   Over the past 30 days how many new lesions did you get? 4   Over the past week, how many dressing changes do you do each day? 2   Over the past week, has your wound drainage been: Mild   Rate your HS overall from 0 - 10 (0 = no disease, 10 = worst) over the past week:  6   Rate your pain score from 0 - 10 (0 = no disease, 10 = worst) for the most painful/symptomatic lesion in the  past week:  5 - Moderate Pain   Over the past week, how much has HS influenced your quality of life? moderately     Detailed Review of Systems  Otherwise nml state of health. No other skin concerns.    Past Medical History:   Past Medical History:   Diagnosis Date     ADHD (attention deficit hyperactivity disorder)     adderall      Anxiety     lexapro  Dr. Carrillo PN      breast lumps 2015    benign on mammogram      Chronic tonsillitis        Medications:  Current Outpatient Medications   Medication     amphetamine-dextroamphetamine (ADDERALL XR) 10 MG per capsule     amphetamine-dextroamphetamine (ADDERALL XR) 30 MG per capsule     busPIRone (BUSPAR) 15 MG tablet     escitalopram (LEXAPRO) 10 MG tablet     HUMIRA PEN 80 MG/0.8ML pen kit     spironolactone (ALDACTONE) 50 MG tablet     BENZONATATE PO     OXYCODONE HCL PO     PANTOPRAZOLE SODIUM PO     No current facility-administered medications for this visit.     Allergies:   No Known Allergies    Social History  Social History     Socioeconomic History     Marital status: Single     Spouse name: Not on file     Number of children: 1     Years of education: Not on file     Highest education level: Not on file   Occupational History     Occupation: student      Comment: nursing school    Tobacco Use     Smoking status: Never Smoker     Smokeless tobacco: Never Used   Substance and Sexual Activity     Alcohol use: No     Alcohol/week: 0.0 standard drinks     Drug use: Yes     Types: Marijuana     Comment: once every couple weeks     Sexual activity: Yes     Partners: Male     Birth control/protection: Natural Family Planning   Other Topics Concern     Parent/sibling w/ CABG, MI or angioplasty before 65F 55M? Not Asked   Social History Narrative     Not on file     Social Determinants of Health     Financial Resource Strain: Not on file   Food Insecurity: Not on file   Transportation Needs: Not on file   Physical Activity: Not on file   Stress: Not on file   Social  Connections: Not on file   Intimate Partner Violence: Not on file   Housing Stability: Not on file     Family History:  Family History   Problem Relation Age of Onset     Anxiety Disorder Mother      Anxiety Disorder Maternal Grandmother      Anxiety Disorder Sister      Anxiety Disorder Brother      Substance Abuse Sister      Substance Abuse Brother      Mental Illness Mother      Mental Illness Brother      Depression Mother      Depression Maternal Grandmother      Thyroid Disease Mother      Physical Exam  /80   GEN: NAD, alert and appropriately responsive  SKIN:   HS Data  HS Exam Data 9/15/2022   LC Type LC1   Clinical Subtypes Regular type   Acne? Yes   Dissecting Cellulitis? No   Visual analogue score (0-100) 35   Total Inflammatory Nodules 9   Total Abcesses 1   Total Draining Tunnels 0   Total Abscess and Nodule Count 10   IHS4 Score  11   Total  HASI Score 10     Labs/Imaging: Reviewed    Assessment & Plan:  1) Hidradenitis suppurativa  - Moderative effect on QOL and severe by IHS4  - Limited improvement on humira  - Start doxycyline 100mg BID   - Ordered resorcinol cream BID for flares  - Referred for nd-yag  - Will check labs and switch to infliximab. Discussed irsks and benefits, including infusion reactions.    Karl Baum MD, FAAD, FACP     Departments of Internal Medicine and Dermatology  [facility]      Thank you for referring Dahlia PIPPA Hayes to the HS clinic

## 2022-09-16 ENCOUNTER — LAB (OUTPATIENT)
Dept: LAB | Facility: CLINIC | Age: 32
End: 2022-09-16

## 2022-09-16 DIAGNOSIS — L73.2 HIDRADENITIS SUPPURATIVA: ICD-10-CM

## 2022-09-16 LAB
ALBUMIN SERPL BCG-MCNC: 4.3 G/DL (ref 3.5–5.2)
ALP SERPL-CCNC: 88 U/L (ref 35–104)
ALT SERPL W P-5'-P-CCNC: 26 U/L (ref 10–35)
ANION GAP SERPL CALCULATED.3IONS-SCNC: 11 MMOL/L (ref 7–15)
AST SERPL W P-5'-P-CCNC: 26 U/L (ref 10–35)
BILIRUB SERPL-MCNC: 0.3 MG/DL
BUN SERPL-MCNC: 9.9 MG/DL (ref 6–20)
CALCIUM SERPL-MCNC: 9.6 MG/DL (ref 8.6–10)
CHLORIDE SERPL-SCNC: 103 MMOL/L (ref 98–107)
CHOLEST SERPL-MCNC: 154 MG/DL
CREAT SERPL-MCNC: 0.74 MG/DL (ref 0.51–0.95)
DEPRECATED CALCIDIOL+CALCIFEROL SERPL-MC: 31 UG/L (ref 20–75)
DEPRECATED HCO3 PLAS-SCNC: 23 MMOL/L (ref 22–29)
GFR SERPL CREATININE-BSD FRML MDRD: >90 ML/MIN/1.73M2
GLUCOSE SERPL-MCNC: 105 MG/DL (ref 70–99)
HBA1C MFR BLD: 6.1 %
HBV SURFACE AG SERPL QL IA: NONREACTIVE
HCV AB SERPL QL IA: NONREACTIVE
HDLC SERPL-MCNC: 56 MG/DL
INSULIN SERPL-ACNC: 18.7 UU/ML (ref 2.6–24.9)
LDLC SERPL CALC-MCNC: 81 MG/DL
NONHDLC SERPL-MCNC: 98 MG/DL
POTASSIUM SERPL-SCNC: 4.4 MMOL/L (ref 3.4–5.3)
PROT SERPL-MCNC: 7.7 G/DL (ref 6.4–8.3)
SODIUM SERPL-SCNC: 137 MMOL/L (ref 136–145)
TRIGL SERPL-MCNC: 84 MG/DL
TSH SERPL DL<=0.005 MIU/L-ACNC: 0.83 UIU/ML (ref 0.3–4.2)

## 2022-09-16 PROCEDURE — 36415 COLL VENOUS BLD VENIPUNCTURE: CPT | Performed by: PATHOLOGY

## 2022-09-16 PROCEDURE — 87340 HEPATITIS B SURFACE AG IA: CPT | Performed by: DERMATOLOGY

## 2022-09-16 PROCEDURE — 82306 VITAMIN D 25 HYDROXY: CPT | Performed by: DERMATOLOGY

## 2022-09-16 PROCEDURE — 80061 LIPID PANEL: CPT | Performed by: DERMATOLOGY

## 2022-09-16 PROCEDURE — 80053 COMPREHEN METABOLIC PANEL: CPT | Performed by: PATHOLOGY

## 2022-09-16 PROCEDURE — 99000 SPECIMEN HANDLING OFFICE-LAB: CPT | Performed by: PATHOLOGY

## 2022-09-16 PROCEDURE — 84630 ASSAY OF ZINC: CPT | Mod: 90 | Performed by: PATHOLOGY

## 2022-09-16 PROCEDURE — 84443 ASSAY THYROID STIM HORMONE: CPT | Performed by: DERMATOLOGY

## 2022-09-16 PROCEDURE — 83036 HEMOGLOBIN GLYCOSYLATED A1C: CPT | Performed by: DERMATOLOGY

## 2022-09-16 PROCEDURE — 86481 TB AG RESPONSE T-CELL SUSP: CPT | Performed by: DERMATOLOGY

## 2022-09-16 PROCEDURE — 86803 HEPATITIS C AB TEST: CPT | Performed by: DERMATOLOGY

## 2022-09-16 PROCEDURE — 86704 HEP B CORE ANTIBODY TOTAL: CPT | Performed by: DERMATOLOGY

## 2022-09-16 PROCEDURE — 83525 ASSAY OF INSULIN: CPT | Performed by: DERMATOLOGY

## 2022-09-18 LAB
QUANTIFERON MITOGEN: 10 IU/ML
QUANTIFERON NIL TUBE: 0.31 IU/ML
QUANTIFERON TB1 TUBE: 0.3 IU/ML
QUANTIFERON TB2 TUBE: 0.29

## 2022-09-19 ENCOUNTER — TELEPHONE (OUTPATIENT)
Dept: SURGERY | Facility: CLINIC | Age: 32
End: 2022-09-19

## 2022-09-19 PROBLEM — L73.2 HIDRADENITIS SUPPURATIVA: Status: ACTIVE | Noted: 2022-09-19

## 2022-09-19 LAB
GAMMA INTERFERON BACKGROUND BLD IA-ACNC: 0.31 IU/ML
HBV CORE AB SERPL QL IA: NONREACTIVE
M TB IFN-G BLD-IMP: NEGATIVE
M TB IFN-G CD4+ BCKGRND COR BLD-ACNC: 9.69 IU/ML
MITOGEN IGNF BCKGRD COR BLD-ACNC: -0.01 IU/ML
MITOGEN IGNF BCKGRD COR BLD-ACNC: -0.02 IU/ML

## 2022-09-19 RX ORDER — EPINEPHRINE 1 MG/ML
0.3 INJECTION, SOLUTION, CONCENTRATE INTRAVENOUS EVERY 5 MIN PRN
OUTPATIENT
Start: 2022-09-19

## 2022-09-19 RX ORDER — MEPERIDINE HYDROCHLORIDE 25 MG/ML
25 INJECTION INTRAMUSCULAR; INTRAVENOUS; SUBCUTANEOUS EVERY 30 MIN PRN
OUTPATIENT
Start: 2022-09-19

## 2022-09-19 RX ORDER — ALBUTEROL SULFATE 0.83 MG/ML
2.5 SOLUTION RESPIRATORY (INHALATION)
OUTPATIENT
Start: 2022-09-19

## 2022-09-19 RX ORDER — ACETAMINOPHEN 325 MG/1
650 TABLET ORAL ONCE
Start: 2022-09-19 | End: 2022-09-19

## 2022-09-19 RX ORDER — METHYLPREDNISOLONE SODIUM SUCCINATE 125 MG/2ML
125 INJECTION, POWDER, LYOPHILIZED, FOR SOLUTION INTRAMUSCULAR; INTRAVENOUS
Start: 2022-09-19

## 2022-09-19 RX ORDER — HEPARIN SODIUM,PORCINE 10 UNIT/ML
5 VIAL (ML) INTRAVENOUS
OUTPATIENT
Start: 2022-09-19

## 2022-09-19 RX ORDER — METHYLPREDNISOLONE SODIUM SUCCINATE 40 MG/ML
40 INJECTION, POWDER, LYOPHILIZED, FOR SOLUTION INTRAMUSCULAR; INTRAVENOUS ONCE
Status: CANCELLED | OUTPATIENT
Start: 2022-09-19

## 2022-09-19 RX ORDER — DIPHENHYDRAMINE HCL 25 MG
25 CAPSULE ORAL ONCE
Start: 2022-09-19 | End: 2022-09-19

## 2022-09-19 RX ORDER — HEPARIN SODIUM (PORCINE) LOCK FLUSH IV SOLN 100 UNIT/ML 100 UNIT/ML
5 SOLUTION INTRAVENOUS
OUTPATIENT
Start: 2022-09-19

## 2022-09-19 RX ORDER — ALBUTEROL SULFATE 90 UG/1
1-2 AEROSOL, METERED RESPIRATORY (INHALATION)
Start: 2022-09-19

## 2022-09-19 RX ORDER — DIPHENHYDRAMINE HYDROCHLORIDE 50 MG/ML
50 INJECTION INTRAMUSCULAR; INTRAVENOUS
Start: 2022-09-19

## 2022-09-20 NOTE — TELEPHONE ENCOUNTER
PB DOS: TBD  Type of Procedure: YAG laser treatment   expect 1 treatment every 3- 6 weeks for a maximum of 12 treatments  CPT Codes: 29684  ICD10 Codes: L73.2 hidradenitis suppurativa   Surgeon/Ordering provider: Karl Baum MD    Pre-cert/Authorization completed:  pending   Payer: [facility]   Spoke to Marcello BEAVER  Ref. # [id number] Auth # [id number]  Valid Dates:     Letter and clinicals uploaded to the [facility] portal         DAVIE Castro  Financial Counselor  [facility]  [address] [place] [address]  [phone]

## 2022-09-20 NOTE — TELEPHONE ENCOUNTER
Financial Counselor Review:    Please submit letter of medical necessity from Dr. Baum dated 9/19/22 to insurance.  Procedure to be performed (include CPT code):  NdYag laser, 26383  Diagnosis:  hidradenitis suppurativa  ICD-10 code:  L73.2  # of treatments requested:  12  Sites: chest, groin, thighs

## 2022-09-21 LAB — ZINC SERPL-MCNC: 82.2 UG/DL

## 2022-09-23 NOTE — TELEPHONE ENCOUNTER
Good Afternoon,    The insurance company is requesting additional information for the prior authorization process. We have already sent in the office visit from 9/15/2022 with Dr. Baum, the letter the provider wrote and an office visit note from urgent care on 3/14/2022. Please let us know if there is any of the following information we can send to the insurance company.         Thanks!    Tracee Chin    Financial Counselor  [address] [place] [address]  Phone- [phone]  Fax- [phone]

## 2022-09-23 NOTE — TELEPHONE ENCOUNTER
PB DOS: TBD *Still pending review as of 9/23    Type of Procedure: YAG laser treatment   expect 1 treatment every 3- 6 weeks for a maximum of 12 treatments  CPT Codes: 73616  ICD10 Codes: L73.2 hidradenitis suppurativa   Surgeon/Ordering provider: Karl Baum MD     Pre-cert/Authorization completed:  pending   Payer: [facility]   Spoke to Marcello SD  Ref. # [id number] Auth # [id number]  Valid Dates:

## 2022-10-10 ENCOUNTER — HOME INFUSION (PRE-WILLOW HOME INFUSION) (OUTPATIENT)
Dept: PHARMACY | Facility: CLINIC | Age: 32
End: 2022-10-10

## 2022-10-22 DIAGNOSIS — L73.2 HIDRADENITIS SUPPURATIVA: ICD-10-CM

## 2022-10-24 ENCOUNTER — CARE COORDINATION (OUTPATIENT)
Dept: PHARMACY | Facility: CLINIC | Age: 32
End: 2022-10-24

## 2022-10-24 NOTE — PROGRESS NOTES
Referred to [facility]    Dahlia Hayes, 1990  Medication (name, frequency and route):  Inflectra IV week 0, 2, 6 then every 8 weeks   Start of Care Date: 10/27/22 (Confirmed with patient)  [facility]  Skilled Nursing will be provided by: [facility]  @ [phone]    RAMONE Shahid

## 2022-10-26 RX ORDER — DOXYCYCLINE 100 MG/1
CAPSULE ORAL
Qty: 60 CAPSULE | Refills: 4 | OUTPATIENT
Start: 2022-10-26

## 2022-10-27 ENCOUNTER — LAB REQUISITION (OUTPATIENT)
Dept: LAB | Facility: CLINIC | Age: 32
End: 2022-10-27
Payer: COMMERCIAL

## 2022-10-27 DIAGNOSIS — L73.2 HIDRADENITIS SUPPURATIVA: ICD-10-CM

## 2022-10-27 LAB
ALBUMIN SERPL-MCNC: 3.5 G/DL (ref 3.4–5)
ALP SERPL-CCNC: 77 U/L (ref 40–150)
ALT SERPL W P-5'-P-CCNC: 30 U/L (ref 0–50)
ANION GAP SERPL CALCULATED.3IONS-SCNC: 7 MMOL/L (ref 3–14)
AST SERPL W P-5'-P-CCNC: 17 U/L (ref 0–45)
BASOPHILS # BLD AUTO: 0.1 10E3/UL (ref 0–0.2)
BASOPHILS NFR BLD AUTO: 1 %
BILIRUB SERPL-MCNC: 0.2 MG/DL (ref 0.2–1.3)
BUN SERPL-MCNC: 13 MG/DL (ref 7–30)
CALCIUM SERPL-MCNC: 8.8 MG/DL (ref 8.5–10.1)
CHLORIDE BLD-SCNC: 105 MMOL/L (ref 94–109)
CO2 SERPL-SCNC: 25 MMOL/L (ref 20–32)
CREAT SERPL-MCNC: 0.75 MG/DL (ref 0.52–1.04)
EOSINOPHIL # BLD AUTO: 0.2 10E3/UL (ref 0–0.7)
EOSINOPHIL NFR BLD AUTO: 1 %
ERYTHROCYTE [DISTWIDTH] IN BLOOD BY AUTOMATED COUNT: 12.4 % (ref 10–15)
GFR SERPL CREATININE-BSD FRML MDRD: >90 ML/MIN/1.73M2
GLUCOSE BLD-MCNC: 91 MG/DL (ref 70–99)
HCT VFR BLD AUTO: 41.2 % (ref 35–47)
HGB BLD-MCNC: 14.2 G/DL (ref 11.7–15.7)
IMM GRANULOCYTES # BLD: 0.2 10E3/UL
IMM GRANULOCYTES NFR BLD: 2 %
LYMPHOCYTES # BLD AUTO: 4.1 10E3/UL (ref 0.8–5.3)
LYMPHOCYTES NFR BLD AUTO: 32 %
MCH RBC QN AUTO: 31.3 PG (ref 26.5–33)
MCHC RBC AUTO-ENTMCNC: 34.5 G/DL (ref 31.5–36.5)
MCV RBC AUTO: 91 FL (ref 78–100)
MONOCYTES # BLD AUTO: 0.8 10E3/UL (ref 0–1.3)
MONOCYTES NFR BLD AUTO: 6 %
NEUTROPHILS # BLD AUTO: 7.3 10E3/UL (ref 1.6–8.3)
NEUTROPHILS NFR BLD AUTO: 58 %
NRBC # BLD AUTO: 0 10E3/UL
NRBC BLD AUTO-RTO: 0 /100
PLATELET # BLD AUTO: 405 10E3/UL (ref 150–450)
POTASSIUM BLD-SCNC: 4.1 MMOL/L (ref 3.4–5.3)
PROT SERPL-MCNC: 7.3 G/DL (ref 6.8–8.8)
RBC # BLD AUTO: 4.53 10E6/UL (ref 3.8–5.2)
SODIUM SERPL-SCNC: 137 MMOL/L (ref 133–144)
WBC # BLD AUTO: 12.7 10E3/UL (ref 4–11)

## 2022-10-27 PROCEDURE — 85025 COMPLETE CBC W/AUTO DIFF WBC: CPT | Performed by: DERMATOLOGY

## 2022-10-27 PROCEDURE — 84450 TRANSFERASE (AST) (SGOT): CPT | Performed by: DERMATOLOGY

## 2022-11-02 NOTE — PROGRESS NOTES
This is a recent snapshot of the patient's [facility] medical record.  For current drug dose and complete information and questions, call [phone] or In Basket pool, [facility] ([id number])  CSN Number:  [id number]

## 2022-11-03 ENCOUNTER — TELEPHONE (OUTPATIENT)
Dept: DERMATOLOGY | Facility: CLINIC | Age: 32
End: 2022-11-03

## 2022-11-03 NOTE — TELEPHONE ENCOUNTER
----- Message from Caio Luu sent at 11/3/2022  8:33 AM CDT -----  Regarding: RE: Home infusion?  Good morning,  Yes pt is currently on service with us for Inflectra.    Thank you    ----- Message -----  From: Mana Madden  Sent: 11/3/2022   6:48 AM CDT  To: Terrie Eugene RN, [facility]  Subject: Home infusion?                                   Morning,     Was wondering if Dahlia is doing inflectra via home infusion?    Thank you,  [facility]  [facility] Scheduling  [phone] (option 3 then option 2)  [facility] Scheduling   [phone] (option 5 then option 2)

## 2022-11-10 ENCOUNTER — LAB REQUISITION (OUTPATIENT)
Dept: LAB | Facility: CLINIC | Age: 32
End: 2022-11-10
Payer: COMMERCIAL

## 2022-11-10 ENCOUNTER — DOCUMENTATION ONLY (OUTPATIENT)
Dept: PHARMACY | Facility: CLINIC | Age: 32
End: 2022-11-10

## 2022-11-10 DIAGNOSIS — L73.2 HIDRADENITIS SUPPURATIVA: ICD-10-CM

## 2022-11-10 LAB
ALBUMIN SERPL-MCNC: 3.5 G/DL (ref 3.4–5)
ALP SERPL-CCNC: 86 U/L (ref 40–150)
ALT SERPL W P-5'-P-CCNC: 36 U/L (ref 0–50)
ANION GAP SERPL CALCULATED.3IONS-SCNC: 8 MMOL/L (ref 3–14)
AST SERPL W P-5'-P-CCNC: 25 U/L (ref 0–45)
BASOPHILS # BLD AUTO: 0.1 10E3/UL (ref 0–0.2)
BASOPHILS NFR BLD AUTO: 1 %
BILIRUB SERPL-MCNC: 0.4 MG/DL (ref 0.2–1.3)
BUN SERPL-MCNC: 10 MG/DL (ref 7–30)
CALCIUM SERPL-MCNC: 8.8 MG/DL (ref 8.5–10.1)
CHLORIDE BLD-SCNC: 107 MMOL/L (ref 94–109)
CO2 SERPL-SCNC: 24 MMOL/L (ref 20–32)
CREAT SERPL-MCNC: 0.72 MG/DL (ref 0.52–1.04)
EOSINOPHIL # BLD AUTO: 0.1 10E3/UL (ref 0–0.7)
EOSINOPHIL NFR BLD AUTO: 2 %
ERYTHROCYTE [DISTWIDTH] IN BLOOD BY AUTOMATED COUNT: 12.7 % (ref 10–15)
GFR SERPL CREATININE-BSD FRML MDRD: >90 ML/MIN/1.73M2
GLUCOSE BLD-MCNC: 102 MG/DL (ref 70–99)
HCT VFR BLD AUTO: 40.2 % (ref 35–47)
HGB BLD-MCNC: 13.6 G/DL (ref 11.7–15.7)
IMM GRANULOCYTES # BLD: 0 10E3/UL
IMM GRANULOCYTES NFR BLD: 1 %
LYMPHOCYTES # BLD AUTO: 2.4 10E3/UL (ref 0.8–5.3)
LYMPHOCYTES NFR BLD AUTO: 29 %
MCH RBC QN AUTO: 30.7 PG (ref 26.5–33)
MCHC RBC AUTO-ENTMCNC: 33.8 G/DL (ref 31.5–36.5)
MCV RBC AUTO: 91 FL (ref 78–100)
MONOCYTES # BLD AUTO: 0.7 10E3/UL (ref 0–1.3)
MONOCYTES NFR BLD AUTO: 9 %
NEUTROPHILS # BLD AUTO: 4.7 10E3/UL (ref 1.6–8.3)
NEUTROPHILS NFR BLD AUTO: 58 %
NRBC # BLD AUTO: 0 10E3/UL
NRBC BLD AUTO-RTO: 0 /100
PLATELET # BLD AUTO: 314 10E3/UL (ref 150–450)
POTASSIUM BLD-SCNC: 4.3 MMOL/L (ref 3.4–5.3)
PROT SERPL-MCNC: 7.6 G/DL (ref 6.8–8.8)
RBC # BLD AUTO: 4.43 10E6/UL (ref 3.8–5.2)
SODIUM SERPL-SCNC: 139 MMOL/L (ref 133–144)
WBC # BLD AUTO: 8 10E3/UL (ref 4–11)

## 2022-11-10 PROCEDURE — 82040 ASSAY OF SERUM ALBUMIN: CPT | Performed by: DERMATOLOGY

## 2022-11-10 PROCEDURE — 85025 COMPLETE CBC W/AUTO DIFF WBC: CPT | Performed by: DERMATOLOGY

## 2022-11-10 PROCEDURE — 80053 COMPREHEN METABOLIC PANEL: CPT | Performed by: DERMATOLOGY

## 2022-11-10 NOTE — PROGRESS NOTES
Skilled Nurse visit in the [facility] to administer Inflectra 600mg.  No recent elevated temperature, fever, chills, productive cough, coughing for 3 weeks or longer or hemoptysis, abnormal vital signs, night sweats, chest pain. No  decrease in your appetite, unexplained weight loss or fatigue.  No other new onset medical symptoms.    Peripheral IVright Hand, 1 attempt Pre medicated with none. Labs drawn Cbc/d, CMP. Infusion completed without complication or reaction. Pt reports therapy is effective in managing symptoms related to therapy.  Monique Vasques RN  [facility]  [email or web address]  [phone]

## 2022-12-08 ENCOUNTER — VIRTUAL VISIT (OUTPATIENT)
Dept: DERMATOLOGY | Facility: CLINIC | Age: 32
End: 2022-12-08
Payer: COMMERCIAL

## 2022-12-08 DIAGNOSIS — L73.2 HIDRADENITIS SUPPURATIVA: Primary | ICD-10-CM

## 2022-12-08 PROCEDURE — 99214 OFFICE O/P EST MOD 30 MIN: CPT | Mod: TEL | Performed by: DERMATOLOGY

## 2022-12-08 RX ORDER — SPIRONOLACTONE 50 MG/1
TABLET, FILM COATED ORAL
Qty: 270 TABLET | Refills: 3 | Status: SHIPPED | OUTPATIENT
Start: 2022-12-08 | End: 2023-08-31

## 2022-12-08 ASSESSMENT — PAIN SCALES - GENERAL: PAINLEVEL: NO PAIN (0)

## 2022-12-08 NOTE — PATIENT INSTRUCTIONS
Continue infliximab, doxycycline, and spironolactone. Resorcinol cream is prescribed should you want to trial it if a flare up occurs.     Recommended pneumonia, shingles, and COVID booster at next primary care visit.     [facility]  Address: [address] [place] [address], [place] [address]  Hours: Open ? Closes 6PM  Phone: [phone]

## 2022-12-08 NOTE — NURSING NOTE
Dermatology Rooming Note    Dahlia Hayes's goals for this visit include:   Chief Complaint   Patient presents with     Derm Problem     Dahlia is being called for an HS follow-up.     Wayne Lewis, EMT

## 2022-12-08 NOTE — PROGRESS NOTES
[facility] Dermatology Note  Encounter Date: Dec 8, 2022  Telephone ([phone]). Location of teledermatologist: [facility] [place]. Start time: 5;12pm  End time: 5:30    Dermatology Problem List:  1) Hidradenitis suppurativa  - spironolactone 100 mg, infliximab, doxycycline 100 mg resorcinol cream  2) Acne  - spironolactone 100 mg  3) Ganglion cyst, left wrist  ____________________________________________    Assessment & Plan:     # Hidradenitis Suppurativa - improved  - Continue infliximab infusions   - Continue doxycycline 100 mg  - Continue spironolactone 100 mg   - Resorcinol cream prescribed if flare occurs   - Recommended COVID, shingles, and pneumonia vaccines     # Acne vulgaris.    - Continue spironolactone 100 mg Qday    Follow-up: 6 month(s) in-person, or earlier for new or changing lesions    Staff and Medical Student:      This patient was staffed and seen with Dr. Baum.     Bear Gonzalez, MS3  [facility] [employer]    Provider Disclosure:   The documentation recorded by the scribe accurately reflects the services I personally performed and the decisions made by me.    Karl Baum MD, FAAD    Departments of Internal Medicine and Dermatology  [facility]  [phone]      ____________________________________________    CC: Follow-up HS    HPI:  Ms. Dahlia Hayes is a(n) 32 year old female who presents today as a return patient for HS. Last seen by myself on 9/15/22, at which time the patient was started on doxycycline 100 mg, infliximab infusions, resorcinol cream in addition to her continuing 100 mg of spironolactone for treatment of HS.     Today, she shares she began infusions in October 2022 and has had 2 infusions thus far with another one in the coming week. She also continued to take doxycycline and spironolactone. She never was able to get the  resorcinol cream, after going through a  lot of back and forth with the pharmacy.     In regards to active HS lesions she has only had 1 under her buttock in the last 30 days. She states that normally she would have multiple new lesions popping up on her breasts by this time, but her current regimen appears to be keeping them at bay.     HS Nurse Assessment    Nurse Assessment Data 9/15/2022   Over the past 30 days how many old lesions flared back up? 3   Over the past 30 days how many new lesions did you get? 4   Over the past week, how many dressing changes do you do each day? 2   Over the past week, has your wound drainage been: Mild   Rate your HS overall from 0 - 10 (0 = no disease, 10 = worst) over the past week:  6   Rate your pain score from 0 - 10 (0 = no disease, 10 = worst) for the most painful/symptomatic lesion in the past week:  5 - Moderate Pain   Over the past week, how much has HS influenced your quality of life? moderately     Patient is otherwise feeling well, without additional skin concerns.    Labs Reviewed:  N/A    Physical Exam:  Vitals: There were no vitals taken for this visit.  NA  - No other lesions of concern on areas examined.     Medications:  Current Outpatient Medications   Medication     amphetamine-dextroamphetamine (ADDERALL XR) 10 MG per capsule     amphetamine-dextroamphetamine (ADDERALL XR) 30 MG per capsule     BENZONATATE PO     busPIRone (BUSPAR) 15 MG tablet     doxycycline monohydrate (MONODOX) 100 MG capsule     escitalopram (LEXAPRO) 10 MG tablet     HUMIRA PEN 80 MG/0.8ML pen kit     OXYCODONE HCL PO     PANTOPRAZOLE SODIUM PO     Resorcinol POWD     spironolactone (ALDACTONE) 50 MG tablet     No current facility-administered medications for this visit.      Past Medical/Surgical History:   Patient Active Problem List   Diagnosis     Pneumonia     Hidradenitis suppurativa     Past Medical History:   Diagnosis Date     ADHD (attention deficit hyperactivity disorder)     adderall      Anxiety     lexapro    Lorena PN      breast lumps 2015    benign on mammogram      Chronic tonsillitis        CC No referring provider defined for this encounter. on close of this encounter.

## 2022-12-08 NOTE — LETTER
12/8/2022       RE: Dahlia Hayes  [address]  [place] [address]     Dear Colleague,    Thank you for referring your patient, Dahlia Hayes, to the [facility] [place] at [facility]. Please see a copy of my visit note below.    [facility] Dermatology Note  Encounter Date: Dec 8, 2022  Telephone ([phone]). Location of teledermatologist: [facility] [place]. Start time: 5;12pm  End time: 5:30    Dermatology Problem List:  1) Hidradenitis suppurativa  - spironolactone 100 mg, infliximab, doxycycline 100 mg resorcinol cream  2) Acne  - spironolactone 100 mg  3) Ganglion cyst, left wrist  ____________________________________________    Assessment & Plan:     # Hidradenitis Suppurativa - improved  - Continue infliximab infusions   - Continue doxycycline 100 mg  - Continue spironolactone 100 mg   - Resorcinol cream prescribed if flare occurs   - Recommended COVID, shingles, and pneumonia vaccines     # Acne vulgaris.    - Continue spironolactone 100 mg Qday    Follow-up: 6 month(s) in-person, or earlier for new or changing lesions    Staff and Medical Student:      This patient was staffed and seen with Dr. Baum.     Bear Gonzalez, MS3  [facility] [employer]    Provider Disclosure:   The documentation recorded by the scribe accurately reflects the services I personally performed and the decisions made by me.    Karl Baum MD, FAAD    Departments of Internal Medicine and Dermatology  [facility]  [phone]      ____________________________________________    CC: Follow-up HS    HPI:  Ms. Dahlia Hayes is a(n) 32 year old female who presents today as a return patient for HS. Last seen by myself on 9/15/22, at which time the patient was started on doxycycline 100 mg, infliximab infusions, resorcinol cream in  addition to her continuing 100 mg of spironolactone for treatment of HS.     Today, she shares she began infusions in October 2022 and has had 2 infusions thus far with another one in the coming week. She also continued to take doxycycline and spironolactone. She never was able to get the  resorcinol cream, after going through a lot of back and forth with the pharmacy.     In regards to active HS lesions she has only had 1 under her buttock in the last 30 days. She states that normally she would have multiple new lesions popping up on her breasts by this time, but her current regimen appears to be keeping them at bay.     HS Nurse Assessment    Nurse Assessment Data 9/15/2022   Over the past 30 days how many old lesions flared back up? 3   Over the past 30 days how many new lesions did you get? 4   Over the past week, how many dressing changes do you do each day? 2   Over the past week, has your wound drainage been: Mild   Rate your HS overall from 0 - 10 (0 = no disease, 10 = worst) over the past week:  6   Rate your pain score from 0 - 10 (0 = no disease, 10 = worst) for the most painful/symptomatic lesion in the past week:  5 - Moderate Pain   Over the past week, how much has HS influenced your quality of life? moderately     Patient is otherwise feeling well, without additional skin concerns.    Labs Reviewed:  N/A    Physical Exam:  Vitals: There were no vitals taken for this visit.  NA  - No other lesions of concern on areas examined.     Medications:  Current Outpatient Medications   Medication     amphetamine-dextroamphetamine (ADDERALL XR) 10 MG per capsule     amphetamine-dextroamphetamine (ADDERALL XR) 30 MG per capsule     BENZONATATE PO     busPIRone (BUSPAR) 15 MG tablet     doxycycline monohydrate (MONODOX) 100 MG capsule     escitalopram (LEXAPRO) 10 MG tablet     HUMIRA PEN 80 MG/0.8ML pen kit     OXYCODONE HCL PO     PANTOPRAZOLE SODIUM PO     Resorcinol POWD     spironolactone (ALDACTONE) 50 MG  tablet     No current facility-administered medications for this visit.      Past Medical/Surgical History:   Patient Active Problem List   Diagnosis     Pneumonia     Hidradenitis suppurativa     Past Medical History:   Diagnosis Date     ADHD (attention deficit hyperactivity disorder)     adderall      Anxiety     lexapro  Dr. Carrillo PN      breast lumps 2015    benign on mammogram      Chronic tonsillitis        CC No referring provider defined for this encounter. on close of this encounter.

## 2022-12-13 ENCOUNTER — LAB REQUISITION (OUTPATIENT)
Dept: LAB | Facility: CLINIC | Age: 32
End: 2022-12-13
Payer: COMMERCIAL

## 2022-12-13 ENCOUNTER — DOCUMENTATION ONLY (OUTPATIENT)
Dept: PHARMACY | Facility: CLINIC | Age: 32
End: 2022-12-13

## 2022-12-13 DIAGNOSIS — L73.2 HIDRADENITIS SUPPURATIVA: ICD-10-CM

## 2022-12-13 LAB
ALBUMIN SERPL-MCNC: 3.6 G/DL (ref 3.4–5)
ALP SERPL-CCNC: 90 U/L (ref 40–150)
ALT SERPL W P-5'-P-CCNC: 26 U/L (ref 0–50)
ANION GAP SERPL CALCULATED.3IONS-SCNC: 7 MMOL/L (ref 3–14)
AST SERPL W P-5'-P-CCNC: 15 U/L (ref 0–45)
BASOPHILS # BLD AUTO: 0 10E3/UL (ref 0–0.2)
BASOPHILS NFR BLD AUTO: 0 %
BILIRUB SERPL-MCNC: 0.3 MG/DL (ref 0.2–1.3)
BUN SERPL-MCNC: 12 MG/DL (ref 7–30)
CALCIUM SERPL-MCNC: 9.1 MG/DL (ref 8.5–10.1)
CHLORIDE BLD-SCNC: 107 MMOL/L (ref 94–109)
CO2 SERPL-SCNC: 23 MMOL/L (ref 20–32)
CREAT SERPL-MCNC: 0.71 MG/DL (ref 0.52–1.04)
EOSINOPHIL # BLD AUTO: 0.1 10E3/UL (ref 0–0.7)
EOSINOPHIL NFR BLD AUTO: 1 %
ERYTHROCYTE [DISTWIDTH] IN BLOOD BY AUTOMATED COUNT: 12.5 % (ref 10–15)
GFR SERPL CREATININE-BSD FRML MDRD: >90 ML/MIN/1.73M2
GLUCOSE BLD-MCNC: 109 MG/DL (ref 70–99)
HCT VFR BLD AUTO: 39.4 % (ref 35–47)
HGB BLD-MCNC: 13.9 G/DL (ref 11.7–15.7)
IMM GRANULOCYTES # BLD: 0.1 10E3/UL
IMM GRANULOCYTES NFR BLD: 1 %
LYMPHOCYTES # BLD AUTO: 2.6 10E3/UL (ref 0.8–5.3)
LYMPHOCYTES NFR BLD AUTO: 31 %
MCH RBC QN AUTO: 31.2 PG (ref 26.5–33)
MCHC RBC AUTO-ENTMCNC: 35.3 G/DL (ref 31.5–36.5)
MCV RBC AUTO: 89 FL (ref 78–100)
MONOCYTES # BLD AUTO: 0.7 10E3/UL (ref 0–1.3)
MONOCYTES NFR BLD AUTO: 8 %
NEUTROPHILS # BLD AUTO: 4.9 10E3/UL (ref 1.6–8.3)
NEUTROPHILS NFR BLD AUTO: 59 %
NRBC # BLD AUTO: 0 10E3/UL
NRBC BLD AUTO-RTO: 0 /100
PLATELET # BLD AUTO: 349 10E3/UL (ref 150–450)
POTASSIUM BLD-SCNC: 4.1 MMOL/L (ref 3.4–5.3)
PROT SERPL-MCNC: 7.8 G/DL (ref 6.8–8.8)
RBC # BLD AUTO: 4.45 10E6/UL (ref 3.8–5.2)
SODIUM SERPL-SCNC: 137 MMOL/L (ref 133–144)
WBC # BLD AUTO: 8.3 10E3/UL (ref 4–11)

## 2022-12-13 PROCEDURE — 80053 COMPREHEN METABOLIC PANEL: CPT | Performed by: DERMATOLOGY

## 2022-12-13 PROCEDURE — 85025 COMPLETE CBC W/AUTO DIFF WBC: CPT | Performed by: DERMATOLOGY

## 2022-12-13 NOTE — PROGRESS NOTES
Skilled Nurse visit in the [facility] to administer Inflectra 600mg.  No recent elevated temperature, fever, chills, productive cough, coughing for 3 weeks or longer or hemoptysis, abnormal vital signs, night sweats, chest pain. No  decrease in your appetite, unexplained weight loss or fatigue.  No other new onset medical symptoms.  Current weight 248lb.  Peripheral IVright Hand, 1 attempt Pre medicated with none. Labs drawn Cbc/d, CMP. Infusion completed without complication or reaction. Pt reports therapy is effective in managing symptoms related to therapy.  Monique Vasques RN  [facility] infusion  [email or web address]  [phone]

## 2022-12-22 ENCOUNTER — PATIENT OUTREACH (OUTPATIENT)
Dept: DERMATOLOGY | Facility: CLINIC | Age: 32
End: 2022-12-22

## 2022-12-22 NOTE — TELEPHONE ENCOUNTER
Attempted to reach patient to schedule follow up in the Dermatology Clinic.  No answer,  LM on VM to call office and [employer] message sent..    Schedule with Dr. Karl Baum 6/2023 in-person.

## 2023-01-15 ENCOUNTER — HEALTH MAINTENANCE LETTER (OUTPATIENT)
Age: 33
End: 2023-01-15

## 2023-01-26 ENCOUNTER — TELEPHONE (OUTPATIENT)
Dept: DERMATOLOGY | Facility: CLINIC | Age: 33
End: 2023-01-26
Payer: COMMERCIAL

## 2023-01-26 NOTE — TELEPHONE ENCOUNTER
Humble.informing patient to call [phone] to schedule a follow up with Dr. Baum  in Dermatology.

## 2023-02-04 ENCOUNTER — PATIENT OUTREACH (OUTPATIENT)
Dept: DERMATOLOGY | Facility: CLINIC | Age: 33
End: 2023-02-04
Payer: COMMERCIAL

## 2023-02-04 NOTE — LETTER
February 4, 2023          Dahlia Hayes  [address]  [place] [address]        Dear Dahlia Hayes:    We recently reviewed your medical records from [facility] and found that you are due for an appointment with Dr. Karl Baum.  Our office has attempted to reach you via telephone and left a message.    At your earliest convenience, please call the clinic at [phone] to arrange the recommended exam and possible testing.  Please kindly mention this letter when calling so that your appointment(s) can be accurately scheduled.      Sincerely,       The Clinic Staff        Medical Record Number:  [id number]

## 2023-02-04 NOTE — TELEPHONE ENCOUNTER
Attempted to reach patient to schedule follow up in the Dermatology Clinic.  No answer,  LM on VM to call office and Letter mailed..    Schedule with Dr. Karl Baum.

## 2023-02-07 ENCOUNTER — LAB REQUISITION (OUTPATIENT)
Dept: LAB | Facility: CLINIC | Age: 33
End: 2023-02-07
Payer: COMMERCIAL

## 2023-02-07 ENCOUNTER — DOCUMENTATION ONLY (OUTPATIENT)
Dept: PHARMACY | Facility: CLINIC | Age: 33
End: 2023-02-07

## 2023-02-07 DIAGNOSIS — L73.2 HIDRADENITIS SUPPURATIVA: ICD-10-CM

## 2023-02-07 DIAGNOSIS — L73.2 HIDRADENITIS SUPPURATIVA: Primary | ICD-10-CM

## 2023-02-07 LAB
ALBUMIN SERPL BCG-MCNC: 4.1 G/DL (ref 3.5–5.2)
ALP SERPL-CCNC: 88 U/L (ref 35–104)
ALT SERPL W P-5'-P-CCNC: 39 U/L (ref 10–35)
ANION GAP SERPL CALCULATED.3IONS-SCNC: 15 MMOL/L (ref 7–15)
AST SERPL W P-5'-P-CCNC: 33 U/L (ref 10–35)
BASOPHILS # BLD AUTO: 0 10E3/UL (ref 0–0.2)
BASOPHILS NFR BLD AUTO: 1 %
BILIRUB SERPL-MCNC: 0.5 MG/DL
BUN SERPL-MCNC: 10.5 MG/DL (ref 6–20)
CALCIUM SERPL-MCNC: 9.5 MG/DL (ref 8.6–10)
CHLORIDE SERPL-SCNC: 101 MMOL/L (ref 98–107)
CREAT SERPL-MCNC: 0.73 MG/DL (ref 0.51–0.95)
DEPRECATED HCO3 PLAS-SCNC: 22 MMOL/L (ref 22–29)
EOSINOPHIL # BLD AUTO: 0.1 10E3/UL (ref 0–0.7)
EOSINOPHIL NFR BLD AUTO: 1 %
ERYTHROCYTE [DISTWIDTH] IN BLOOD BY AUTOMATED COUNT: 13.1 % (ref 10–15)
GFR SERPL CREATININE-BSD FRML MDRD: >90 ML/MIN/1.73M2
GLUCOSE SERPL-MCNC: 110 MG/DL (ref 70–99)
HCT VFR BLD AUTO: 40.8 % (ref 35–47)
HGB BLD-MCNC: 13.9 G/DL (ref 11.7–15.7)
IMM GRANULOCYTES # BLD: 0 10E3/UL
IMM GRANULOCYTES NFR BLD: 1 %
LYMPHOCYTES # BLD AUTO: 2.4 10E3/UL (ref 0.8–5.3)
LYMPHOCYTES NFR BLD AUTO: 30 %
MCH RBC QN AUTO: 31 PG (ref 26.5–33)
MCHC RBC AUTO-ENTMCNC: 34.1 G/DL (ref 31.5–36.5)
MCV RBC AUTO: 91 FL (ref 78–100)
MONOCYTES # BLD AUTO: 0.7 10E3/UL (ref 0–1.3)
MONOCYTES NFR BLD AUTO: 8 %
NEUTROPHILS # BLD AUTO: 4.8 10E3/UL (ref 1.6–8.3)
NEUTROPHILS NFR BLD AUTO: 59 %
NRBC # BLD AUTO: 0 10E3/UL
NRBC BLD AUTO-RTO: 0 /100
PLATELET # BLD AUTO: 339 10E3/UL (ref 150–450)
POTASSIUM SERPL-SCNC: 4.4 MMOL/L (ref 3.4–5.3)
PROT SERPL-MCNC: 7.5 G/DL (ref 6.4–8.3)
RBC # BLD AUTO: 4.48 10E6/UL (ref 3.8–5.2)
SODIUM SERPL-SCNC: 138 MMOL/L (ref 136–145)
WBC # BLD AUTO: 8.1 10E3/UL (ref 4–11)

## 2023-02-07 PROCEDURE — 85025 COMPLETE CBC W/AUTO DIFF WBC: CPT | Performed by: DERMATOLOGY

## 2023-02-07 PROCEDURE — 80053 COMPREHEN METABOLIC PANEL: CPT | Performed by: DERMATOLOGY

## 2023-02-07 NOTE — PROGRESS NOTES
Skilled Nurse visit in the [facility] to administer Inflectra 600mg IV over about 2 hours.  No recent elevated temperature, fever, chills, productive cough, coughing for 3 weeks or longer or hemoptysis, abnormal vital signs, night sweats, chest pain. No  decrease in your appetite, unexplained weight loss or fatigue.  No other new onset medical symptoms.  Current weight 245 lbs.  Peripheral IVright Hand, 2 attempts . Labs drawn CBCDP,CMP Infusion completed without complication or reaction. Pt reports therapy is effective in managing symptoms related to therapy. However pt states the effectiveness seems to be wearing off faster as her symptoms reappear and worsen a few weeks prior to next dose.      Kell Ferrer RN BSN RYAN  [facility]  [phone]  [email or web address]

## 2023-02-14 ENCOUNTER — TELEPHONE (OUTPATIENT)
Dept: DERMATOLOGY | Facility: CLINIC | Age: 33
End: 2023-02-14
Payer: COMMERCIAL

## 2023-02-14 NOTE — TELEPHONE ENCOUNTER
[facility] Call Center    Phone Message    May a detailed message be left on voicemail: yes     Reason for Call: Other: Patient called to schedule appt with Dr. Baum - Dr. aBum wants to see patient within 8 weeks. Writer scheduled for 05/25/2023. Patient is okay with this date unless dr wants to see her sooner.    Please call back  Thank you    Action Taken: Message routed to:  [facility] Derm Rheum    Travel Screening: Not Applicable

## 2023-02-21 DIAGNOSIS — L73.2 HIDRADENITIS SUPPURATIVA: Primary | ICD-10-CM

## 2023-04-04 ENCOUNTER — LAB REQUISITION (OUTPATIENT)
Dept: LAB | Facility: CLINIC | Age: 33
End: 2023-04-04
Payer: COMMERCIAL

## 2023-04-04 ENCOUNTER — DOCUMENTATION ONLY (OUTPATIENT)
Dept: PHARMACY | Facility: CLINIC | Age: 33
End: 2023-04-04

## 2023-04-04 DIAGNOSIS — L73.2 HIDRADENITIS SUPPURATIVA: ICD-10-CM

## 2023-04-04 LAB
ALBUMIN SERPL BCG-MCNC: 4.3 G/DL (ref 3.5–5.2)
ALP SERPL-CCNC: 96 U/L (ref 35–104)
ALT SERPL W P-5'-P-CCNC: 26 U/L (ref 10–35)
ANION GAP SERPL CALCULATED.3IONS-SCNC: 12 MMOL/L (ref 7–15)
AST SERPL W P-5'-P-CCNC: 35 U/L (ref 10–35)
BASOPHILS # BLD AUTO: 0 10E3/UL (ref 0–0.2)
BASOPHILS NFR BLD AUTO: 1 %
BILIRUB SERPL-MCNC: 0.5 MG/DL
BUN SERPL-MCNC: 10 MG/DL (ref 6–20)
CALCIUM SERPL-MCNC: 9.6 MG/DL (ref 8.6–10)
CHLORIDE SERPL-SCNC: 102 MMOL/L (ref 98–107)
CREAT SERPL-MCNC: 0.71 MG/DL (ref 0.51–0.95)
DEPRECATED HCO3 PLAS-SCNC: 22 MMOL/L (ref 22–29)
EOSINOPHIL # BLD AUTO: 0.1 10E3/UL (ref 0–0.7)
EOSINOPHIL NFR BLD AUTO: 1 %
ERYTHROCYTE [DISTWIDTH] IN BLOOD BY AUTOMATED COUNT: 12.4 % (ref 10–15)
GFR SERPL CREATININE-BSD FRML MDRD: >90 ML/MIN/1.73M2
GLUCOSE SERPL-MCNC: 120 MG/DL (ref 70–99)
HCT VFR BLD AUTO: 41.2 % (ref 35–47)
HGB BLD-MCNC: 14.2 G/DL (ref 11.7–15.7)
IMM GRANULOCYTES # BLD: 0 10E3/UL
IMM GRANULOCYTES NFR BLD: 0 %
LYMPHOCYTES # BLD AUTO: 2.6 10E3/UL (ref 0.8–5.3)
LYMPHOCYTES NFR BLD AUTO: 33 %
MCH RBC QN AUTO: 31.1 PG (ref 26.5–33)
MCHC RBC AUTO-ENTMCNC: 34.5 G/DL (ref 31.5–36.5)
MCV RBC AUTO: 90 FL (ref 78–100)
MONOCYTES # BLD AUTO: 0.6 10E3/UL (ref 0–1.3)
MONOCYTES NFR BLD AUTO: 8 %
NEUTROPHILS # BLD AUTO: 4.5 10E3/UL (ref 1.6–8.3)
NEUTROPHILS NFR BLD AUTO: 57 %
NRBC # BLD AUTO: 0 10E3/UL
NRBC BLD AUTO-RTO: 0 /100
PLATELET # BLD AUTO: 327 10E3/UL (ref 150–450)
POTASSIUM SERPL-SCNC: 4.5 MMOL/L (ref 3.4–5.3)
PROT SERPL-MCNC: 7.4 G/DL (ref 6.4–8.3)
RBC # BLD AUTO: 4.57 10E6/UL (ref 3.8–5.2)
SODIUM SERPL-SCNC: 136 MMOL/L (ref 136–145)
WBC # BLD AUTO: 7.8 10E3/UL (ref 4–11)

## 2023-04-04 PROCEDURE — 80230 DRUG ASSAY INFLIXIMAB: CPT | Performed by: DERMATOLOGY

## 2023-04-04 PROCEDURE — 85025 COMPLETE CBC W/AUTO DIFF WBC: CPT | Performed by: DERMATOLOGY

## 2023-04-04 PROCEDURE — 80053 COMPREHEN METABOLIC PANEL: CPT | Performed by: DERMATOLOGY

## 2023-04-04 NOTE — PROGRESS NOTES
Skilled Nurse visit in the [facility]fusion [facility] to administer Inflectra 600mg in 190mL NS.  No recent elevated temperature, fever, chills, productive cough, coughing for 3 weeks or longer or hemoptysis, abnormal vital signs, night sweats, chest pain. No  decrease in your appetite, unexplained weight loss or fatigue.  No other new onset medical symptoms.  Current weight 245 lbs.  Peripheral IV right Hand, 1 attempt. Labs drawn CBC w/diff, CMP, Infliximab level. Infusion completed without complication or reaction. Pt reports therapy iseffective in managing symptoms related to therapy.    Reta Angeles RN  [facility]

## 2023-04-07 LAB
INFLIXIMAB AB SERPL IA-MCNC: <3.1 U/ML
INFLIXIMAB SERPL-MCNC: 12.5 UG/ML

## 2023-05-25 ENCOUNTER — OFFICE VISIT (OUTPATIENT)
Dept: DERMATOLOGY | Facility: CLINIC | Age: 33
End: 2023-05-25
Payer: COMMERCIAL

## 2023-05-25 VITALS — SYSTOLIC BLOOD PRESSURE: 127 MMHG | DIASTOLIC BLOOD PRESSURE: 74 MMHG | WEIGHT: 265.2 LBS | BODY MASS INDEX: 46.98 KG/M2

## 2023-05-25 DIAGNOSIS — L73.2 HIDRADENITIS SUPPURATIVA: Primary | ICD-10-CM

## 2023-05-25 PROCEDURE — 11900 INJECT SKIN LESIONS </W 7: CPT | Performed by: DERMATOLOGY

## 2023-05-25 PROCEDURE — 99214 OFFICE O/P EST MOD 30 MIN: CPT | Mod: 25 | Performed by: DERMATOLOGY

## 2023-05-25 RX ORDER — RESORCINOL
POWDER (GRAM) MISCELLANEOUS
Qty: 30 G | Refills: 4 | Status: SHIPPED | OUTPATIENT
Start: 2023-05-25 | End: 2023-08-31

## 2023-05-25 RX ORDER — TRIAMCINOLONE ACETONIDE 40 MG/ML
40 INJECTION, SUSPENSION INTRA-ARTICULAR; INTRAMUSCULAR ONCE
Status: COMPLETED | OUTPATIENT
Start: 2023-05-25 | End: 2023-05-26

## 2023-05-25 RX ADMIN — TRIAMCINOLONE ACETONIDE 40 MG: 40 INJECTION, SUSPENSION INTRA-ARTICULAR; INTRAMUSCULAR at 17:00

## 2023-05-25 NOTE — PATIENT INSTRUCTIONS
Increase infliximab to every 4 weeks   Continue spironolactone 100mg daily  Doxycyline 100mg twice a day  Steroid injections today  Ordered resorcinol twice a day as needed for flares  Chemistry Rx  Resorcinol 15% in [facility]   30 g = $68  60g = $95   Phone #: [phone]    * Get shingles and pneumonia vaccines with PCP

## 2023-05-25 NOTE — LETTER
5/25/2023       RE: Dahlia Hayes  [address]  [place] [address]     Dear Colleague,    Thank you for referring your patient, Dahlia Hayes, to the [facility]ERMATOLOG[facility] [place] at [facility]. Please see a copy of my visit note below.    [facility] Dermatology Note  Encounter Date: May 25, 2023  Office Visit     Dermatology Problem List:  1) Hidradenitis suppurativa  - spironolactone 100 mg, infliximab, doxycycline 100 mg, resorcinol cream  2) Acne  - spironolactone 100 mg  3) Ganglion cyst, left wrist  ____________________________________________    Assessment & Plan:  # Hidradenitis Suppurativa - improved with infliximab but gets flares 3-4 weeks after infusions. Flares usually in inguinal regions and under breasts. No axillary involvement.  - Continue infliximab infusions; change frequency to q4 weeks from q8 weeks  - Continue doxycycline 100 mg BID  - Continue spironolactone 100 mg daily  - Resorcinol cream prescribed if flare occurs   - Recommended COVID, shingles, and pneumonia vaccines      # Acne vulgaris.    - Continue spironolactone 100 mg Qday    Procedures Performed:   - Intra-lesional triamcinolone procedure note. After verbal consent and review of risk of pain and skin thinning/atrophy, positioning and cleansing with isopropyl alcohol, 60 total mL of triamcinolonewas injected into a total of 3 lesion(s) on the right inguinal lesion. The patient tolerated the procedure well and left the dermatology clinic in good condition.    Follow-up: 3 month(s) virtually (telephone with photos), or earlier for new or changing lesions    Staff and Resident:     Staffed with Dr. Sarika Martinez Jr., MD  Internal Medicine- PGY2  [facility]    Staff Physician Comments:   I saw and evaluated the patient with the resident and I agree with the assessment and plan.  I was present  for the key portions of the above procedure and examination.    Karl Baum MD, FAAD    Departments of Internal Medicine and Dermatology  [facility]  [phone]    ____________________________________________    CC: Follow-up HS    HPI:  Ms. Dahlia Hayes is a(n) 32 year old female who presents today as a return patient for HS. Last seen by Dr. Baum virtually on 12/8/22. She states that the infliximab infusions are helpful but she notices flares on her chest and groin area after 3-4 weeks post transfusion. She has been using other therapies. Was unable to get the rocinerol cream. Spironolactone helpful for acne.    Patient is otherwise feeling well, without additional skin concerns.    Physical Exam:  Vitals: /74 (BP Location: Right arm, Patient Position: Right side)   Wt 120.3 kg (265 lb 3.2 oz)   BMI 46.98 kg/m    SKIN: Focused examination of chest and groin was performed.  - Three inflammatory HS lesions in the right inguinal region  - HS lesions without abscess under right breast  - Inflammatory HS lesion on right inner thigh  - No other lesions of concern on areas examined.   (Flowsheet data was lost)     Medications:  Current Outpatient Medications   Medication    amphetamine-dextroamphetamine (ADDERALL XR) 10 MG per capsule    amphetamine-dextroamphetamine (ADDERALL XR) 30 MG per capsule    BENZONATATE PO    busPIRone (BUSPAR) 15 MG tablet    doxycycline monohydrate (MONODOX) 100 MG capsule    escitalopram (LEXAPRO) 10 MG tablet    HUMIRA PEN 80 MG/0.8ML pen kit    OXYCODONE HCL PO    PANTOPRAZOLE SODIUM PO    Resorcinol POWD    spironolactone (ALDACTONE) 50 MG tablet     No current facility-administered medications for this visit.      Past Medical History:   Patient Active Problem List   Diagnosis    Pneumonia    Hidradenitis suppurativa     Past Medical History:   Diagnosis Date    ADHD (attention deficit hyperactivity disorder)     adderall     Anxiety      jose antonioapro  Dr. Carrillo PN     breast lumps 2015    benign on mammogram     Chronic tonsillitis

## 2023-05-25 NOTE — PROGRESS NOTES
[facility] Dermatology Note  Encounter Date: May 25, 2023  Office Visit     Dermatology Problem List:  1) Hidradenitis suppurativa  - spironolactone 100 mg, infliximab, doxycycline 100 mg, resorcinol cream  2) Acne  - spironolactone 100 mg  3) Ganglion cyst, left wrist  ____________________________________________    Assessment & Plan:  # Hidradenitis Suppurativa - improved with infliximab but gets flares 3-4 weeks after infusions. Flares usually in inguinal regions and under breasts. No axillary involvement.  - Continue infliximab infusions; change frequency to q4 weeks from q8 weeks  - Continue doxycycline 100 mg BID  - Continue spironolactone 100 mg daily  - Resorcinol cream prescribed if flare occurs   - Recommended COVID, shingles, and pneumonia vaccines      # Acne vulgaris.    - Continue spironolactone 100 mg Qday    Procedures Performed:   - Intra-lesional triamcinolone procedure note. After verbal consent and review of risk of pain and skin thinning/atrophy, positioning and cleansing with isopropyl alcohol, 60 total mL of triamcinolonewas injected into a total of 3 lesion(s) on the right inguinal lesion. The patient tolerated the procedure well and left the dermatology clinic in good condition.    Follow-up: 3 month(s) virtually (telephone with photos), or earlier for new or changing lesions    Staff and Resident:     Staffed with Dr. Sarika Martinez Jr., MD  Internal Medicine- PGY2  [facility]    Staff Physician Comments:   I saw and evaluated the patient with the resident and I agree with the assessment and plan.  I was present for the key portions of the above procedure and examination.    Karl Baum MD, FAAD    Departments of Internal Medicine and Dermatology  [facility]  [phone]    ____________________________________________    CC: Follow-up HS    HPI:  Ms. Dahlia Hayes is a(n) 32 year old female who  presents today as a return patient for HS. Last seen by Dr. Sarika davidson on 12/8/22. She states that the infliximab infusions are helpful but she notices flares on her chest and groin area after 3-4 weeks post transfusion. She has been using other therapies. Was unable to get the rocinerol cream. Spironolactone helpful for acne.    Patient is otherwise feeling well, without additional skin concerns.    Physical Exam:  Vitals: /74 (BP Location: Right arm, Patient Position: Right side)   Wt 120.3 kg (265 lb 3.2 oz)   BMI 46.98 kg/m    SKIN: Focused examination of chest and groin was performed.  - Three inflammatory HS lesions in the right inguinal region  - HS lesions without abscess under right breast  - Inflammatory HS lesion on right inner thigh  - No other lesions of concern on areas examined.   (Flowsheet data was lost)     Medications:  Current Outpatient Medications   Medication     amphetamine-dextroamphetamine (ADDERALL XR) 10 MG per capsule     amphetamine-dextroamphetamine (ADDERALL XR) 30 MG per capsule     BENZONATATE PO     busPIRone (BUSPAR) 15 MG tablet     doxycycline monohydrate (MONODOX) 100 MG capsule     escitalopram (LEXAPRO) 10 MG tablet     HUMIRA PEN 80 MG/0.8ML pen kit     OXYCODONE HCL PO     PANTOPRAZOLE SODIUM PO     Resorcinol POWD     spironolactone (ALDACTONE) 50 MG tablet     No current facility-administered medications for this visit.      Past Medical History:   Patient Active Problem List   Diagnosis     Pneumonia     Hidradenitis suppurativa     Past Medical History:   Diagnosis Date     ADHD (attention deficit hyperactivity disorder)     adderall      Anxiety     lexapro  Dr. Carrillo PN      breast lumps 2015    benign on mammogram      Chronic tonsillitis

## 2023-05-25 NOTE — PROGRESS NOTES
HS Nurse Assessment        9/15/2022     1:16 PM 12/8/2022     3:16 PM 5/25/2023     3:34 PM   Nurse Assessment Data   Over the past 30 days how many old lesions flared back up? 3 1 4   Over the past 30 days how many new lesions did you get? 4 1 6   Over the past week, how many dressing changes do you do each day? 2 0 2   Over the past week, has your wound drainage been: Mild Mild Moderate   Rate your HS overall from 0 - 10 (0 = no disease, 10 = worst) over the past week:  6 4 6   Rate your pain score from 0 - 10 (0 = no disease, 10 = worst) for the most painful/symptomatic lesion in the past week:  5 - Moderate Pain 2 5 - Moderate Pain   Over the past week, how much has HS influenced your quality of life? moderately slightly very much

## 2023-05-26 RX ADMIN — TRIAMCINOLONE ACETONIDE 40 MG: 40 INJECTION, SUSPENSION INTRA-ARTICULAR; INTRAMUSCULAR at 11:11

## 2023-05-26 NOTE — NURSING NOTE
Drug Administration Record    Prior to injection, verified patient identity using patient's name and date of birth.  Due to injection administration, patient instructed to remain in clinic for 15 minutes  afterwards, and to report any adverse reaction to me immediately.    Drug Name: triamcinolone acetonide(kenalog)  Dose: 2mL of triamcinolone 10mg/mL, 20mg dose  Route administered: ID  NDC #: Kenalog-10 (1119-9651-16)  Amount of waste(mL):3  Reason for waste: Single use vial  LOT #: [id number]  : [employer]  EXPIRATION DATE: 09/2024      Drug Name: triamcinolone acetonide(kenalog)  Dose: 1mL of triamcinolone 40mg/mL, 40mg dose  Route administered: ID  NDC #: Kenalog-40 ([address]-7)  Amount of waste(mL):0  LOT #: [id number]  : [employer]  EXPIRATION DATE: 12/31/2024

## 2023-05-30 ENCOUNTER — LAB REQUISITION (OUTPATIENT)
Dept: LAB | Facility: CLINIC | Age: 33
End: 2023-05-30
Payer: COMMERCIAL

## 2023-05-30 DIAGNOSIS — L73.2 HIDRADENITIS SUPPURATIVA: ICD-10-CM

## 2023-05-30 LAB
ALBUMIN SERPL BCG-MCNC: 4.2 G/DL (ref 3.5–5.2)
ALP SERPL-CCNC: 91 U/L (ref 35–104)
ALT SERPL W P-5'-P-CCNC: 22 U/L (ref 10–35)
ANION GAP SERPL CALCULATED.3IONS-SCNC: 14 MMOL/L (ref 7–15)
AST SERPL W P-5'-P-CCNC: 32 U/L (ref 10–35)
BASOPHILS # BLD AUTO: 0 10E3/UL (ref 0–0.2)
BASOPHILS NFR BLD AUTO: 0 %
BILIRUB SERPL-MCNC: 0.3 MG/DL
BUN SERPL-MCNC: 15.3 MG/DL (ref 6–20)
CALCIUM SERPL-MCNC: 9.4 MG/DL (ref 8.6–10)
CHLORIDE SERPL-SCNC: 101 MMOL/L (ref 98–107)
CREAT SERPL-MCNC: 0.8 MG/DL (ref 0.51–0.95)
DEPRECATED HCO3 PLAS-SCNC: 23 MMOL/L (ref 22–29)
EOSINOPHIL # BLD AUTO: 0.1 10E3/UL (ref 0–0.7)
EOSINOPHIL NFR BLD AUTO: 1 %
ERYTHROCYTE [DISTWIDTH] IN BLOOD BY AUTOMATED COUNT: 12.7 % (ref 10–15)
GFR SERPL CREATININE-BSD FRML MDRD: >90 ML/MIN/1.73M2
GLUCOSE SERPL-MCNC: 97 MG/DL (ref 70–99)
HCT VFR BLD AUTO: 41.5 % (ref 35–47)
HGB BLD-MCNC: 14.2 G/DL (ref 11.7–15.7)
IMM GRANULOCYTES # BLD: 0.1 10E3/UL
IMM GRANULOCYTES NFR BLD: 1 %
LYMPHOCYTES # BLD AUTO: 3.1 10E3/UL (ref 0.8–5.3)
LYMPHOCYTES NFR BLD AUTO: 30 %
MCH RBC QN AUTO: 31.3 PG (ref 26.5–33)
MCHC RBC AUTO-ENTMCNC: 34.2 G/DL (ref 31.5–36.5)
MCV RBC AUTO: 91 FL (ref 78–100)
MONOCYTES # BLD AUTO: 0.7 10E3/UL (ref 0–1.3)
MONOCYTES NFR BLD AUTO: 7 %
NEUTROPHILS # BLD AUTO: 6.2 10E3/UL (ref 1.6–8.3)
NEUTROPHILS NFR BLD AUTO: 61 %
NRBC # BLD AUTO: 0 10E3/UL
NRBC BLD AUTO-RTO: 0 /100
PLATELET # BLD AUTO: 351 10E3/UL (ref 150–450)
POTASSIUM SERPL-SCNC: 4.5 MMOL/L (ref 3.4–5.3)
PROT SERPL-MCNC: 7.5 G/DL (ref 6.4–8.3)
RBC # BLD AUTO: 4.54 10E6/UL (ref 3.8–5.2)
SODIUM SERPL-SCNC: 138 MMOL/L (ref 136–145)
WBC # BLD AUTO: 10.2 10E3/UL (ref 4–11)

## 2023-05-30 PROCEDURE — 80053 COMPREHEN METABOLIC PANEL: CPT | Performed by: DERMATOLOGY

## 2023-05-30 PROCEDURE — 85014 HEMATOCRIT: CPT | Performed by: DERMATOLOGY

## 2023-06-03 ENCOUNTER — HEALTH MAINTENANCE LETTER (OUTPATIENT)
Age: 33
End: 2023-06-03

## 2023-06-27 ENCOUNTER — LAB REQUISITION (OUTPATIENT)
Dept: LAB | Facility: CLINIC | Age: 33
End: 2023-06-27
Payer: COMMERCIAL

## 2023-06-27 ENCOUNTER — DOCUMENTATION ONLY (OUTPATIENT)
Dept: PHARMACY | Facility: CLINIC | Age: 33
End: 2023-06-27

## 2023-06-27 ENCOUNTER — HOME INFUSION (PRE-WILLOW HOME INFUSION) (OUTPATIENT)
Dept: DERMATOLOGY | Facility: CLINIC | Age: 33
End: 2023-06-27

## 2023-06-27 DIAGNOSIS — L73.2 HIDRADENITIS SUPPURATIVA: ICD-10-CM

## 2023-06-27 LAB
ALBUMIN SERPL BCG-MCNC: 4 G/DL (ref 3.5–5.2)
ALP SERPL-CCNC: 86 U/L (ref 35–104)
ALT SERPL W P-5'-P-CCNC: 25 U/L (ref 0–50)
ANION GAP SERPL CALCULATED.3IONS-SCNC: 10 MMOL/L (ref 7–15)
AST SERPL W P-5'-P-CCNC: 25 U/L (ref 0–45)
BASOPHILS # BLD AUTO: 0 10E3/UL (ref 0–0.2)
BASOPHILS NFR BLD AUTO: 0 %
BILIRUB SERPL-MCNC: 0.3 MG/DL
BUN SERPL-MCNC: 12.6 MG/DL (ref 6–20)
CALCIUM SERPL-MCNC: 9 MG/DL (ref 8.6–10)
CHLORIDE SERPL-SCNC: 103 MMOL/L (ref 98–107)
CREAT SERPL-MCNC: 0.71 MG/DL (ref 0.51–0.95)
DEPRECATED HCO3 PLAS-SCNC: 23 MMOL/L (ref 22–29)
EOSINOPHIL # BLD AUTO: 0.1 10E3/UL (ref 0–0.7)
EOSINOPHIL NFR BLD AUTO: 1 %
ERYTHROCYTE [DISTWIDTH] IN BLOOD BY AUTOMATED COUNT: 12.8 % (ref 10–15)
GFR SERPL CREATININE-BSD FRML MDRD: >90 ML/MIN/1.73M2
GLUCOSE SERPL-MCNC: 104 MG/DL (ref 70–99)
HCT VFR BLD AUTO: 41.1 % (ref 35–47)
HGB BLD-MCNC: 14.3 G/DL (ref 11.7–15.7)
IMM GRANULOCYTES # BLD: 0.1 10E3/UL
IMM GRANULOCYTES NFR BLD: 1 %
LYMPHOCYTES # BLD AUTO: 2.6 10E3/UL (ref 0.8–5.3)
LYMPHOCYTES NFR BLD AUTO: 33 %
MCH RBC QN AUTO: 32 PG (ref 26.5–33)
MCHC RBC AUTO-ENTMCNC: 34.8 G/DL (ref 31.5–36.5)
MCV RBC AUTO: 92 FL (ref 78–100)
MONOCYTES # BLD AUTO: 0.6 10E3/UL (ref 0–1.3)
MONOCYTES NFR BLD AUTO: 7 %
NEUTROPHILS # BLD AUTO: 4.5 10E3/UL (ref 1.6–8.3)
NEUTROPHILS NFR BLD AUTO: 58 %
NRBC # BLD AUTO: 0 10E3/UL
NRBC BLD AUTO-RTO: 0 /100
PLATELET # BLD AUTO: 337 10E3/UL (ref 150–450)
POTASSIUM SERPL-SCNC: 4.3 MMOL/L (ref 3.4–5.3)
PROT SERPL-MCNC: 7.2 G/DL (ref 6.4–8.3)
RBC # BLD AUTO: 4.47 10E6/UL (ref 3.8–5.2)
SODIUM SERPL-SCNC: 136 MMOL/L (ref 136–145)
WBC # BLD AUTO: 7.8 10E3/UL (ref 4–11)

## 2023-06-27 PROCEDURE — 85041 AUTOMATED RBC COUNT: CPT | Performed by: DERMATOLOGY

## 2023-06-27 PROCEDURE — 85018 HEMOGLOBIN: CPT | Performed by: DERMATOLOGY

## 2023-06-27 PROCEDURE — 80053 COMPREHEN METABOLIC PANEL: CPT | Performed by: DERMATOLOGY

## 2023-06-27 NOTE — PROGRESS NOTES
Skilled Nurse visit in the [facility] to administer Inflectra 600mg in 190mL NS.  No recent elevated temperature, fever, chills, productive cough, coughing for 3 weeks or longer or hemoptysis, abnormal vital signs, night sweats, chest pain. No  decrease in your appetite, unexplained weight loss or fatigue.  No other new onset medical symptoms.  Current weight 250 lbs.  Peripheral IV left Hand, 2 attempts. Labs drawn CBC w/diff and CMP. Infusion completed without complication or reaction. Pt reports therapy is effective in managing symptoms related to therapy    Cherri Ibarra RN, BSN, B.S., CP  Resource Nurse  [facility]  [facility], [address] [address]  [email or web address]

## 2023-06-29 ENCOUNTER — TELEPHONE (OUTPATIENT)
Dept: DERMATOLOGY | Facility: CLINIC | Age: 33
End: 2023-06-29

## 2023-06-29 NOTE — TELEPHONE ENCOUNTER
Patient due for follow up appt. At end of August or sept. If it is telephone it is okay per the patient to schedule any day and let her know. If it needs to be in person please call to schedule.     Morenita LAWLER CMA

## 2023-07-25 ENCOUNTER — LAB REQUISITION (OUTPATIENT)
Dept: LAB | Facility: CLINIC | Age: 33
End: 2023-07-25
Payer: COMMERCIAL

## 2023-07-25 DIAGNOSIS — L73.2 HIDRADENITIS SUPPURATIVA: ICD-10-CM

## 2023-07-25 LAB
ALBUMIN SERPL BCG-MCNC: 4.4 G/DL (ref 3.5–5.2)
ALP SERPL-CCNC: 108 U/L (ref 35–104)
ALT SERPL W P-5'-P-CCNC: 29 U/L (ref 0–50)
ANION GAP SERPL CALCULATED.3IONS-SCNC: 15 MMOL/L (ref 7–15)
AST SERPL W P-5'-P-CCNC: 28 U/L (ref 0–45)
BASOPHILS # BLD AUTO: 0 10E3/UL (ref 0–0.2)
BASOPHILS NFR BLD AUTO: 0 %
BILIRUB SERPL-MCNC: 0.2 MG/DL
BUN SERPL-MCNC: 7.6 MG/DL (ref 6–20)
CALCIUM SERPL-MCNC: 9.4 MG/DL (ref 8.6–10)
CHLORIDE SERPL-SCNC: 98 MMOL/L (ref 98–107)
CREAT SERPL-MCNC: 0.7 MG/DL (ref 0.51–0.95)
DEPRECATED HCO3 PLAS-SCNC: 22 MMOL/L (ref 22–29)
EOSINOPHIL # BLD AUTO: 0.1 10E3/UL (ref 0–0.7)
EOSINOPHIL NFR BLD AUTO: 1 %
ERYTHROCYTE [DISTWIDTH] IN BLOOD BY AUTOMATED COUNT: 13.1 % (ref 10–15)
GFR SERPL CREATININE-BSD FRML MDRD: >90 ML/MIN/1.73M2
GLUCOSE SERPL-MCNC: 83 MG/DL (ref 70–99)
HCT VFR BLD AUTO: 41.5 % (ref 35–47)
HGB BLD-MCNC: 14.1 G/DL (ref 11.7–15.7)
IMM GRANULOCYTES # BLD: 0.1 10E3/UL
IMM GRANULOCYTES NFR BLD: 1 %
LYMPHOCYTES # BLD AUTO: 3.6 10E3/UL (ref 0.8–5.3)
LYMPHOCYTES NFR BLD AUTO: 30 %
MCH RBC QN AUTO: 31.1 PG (ref 26.5–33)
MCHC RBC AUTO-ENTMCNC: 34 G/DL (ref 31.5–36.5)
MCV RBC AUTO: 91 FL (ref 78–100)
MONOCYTES # BLD AUTO: 0.7 10E3/UL (ref 0–1.3)
MONOCYTES NFR BLD AUTO: 6 %
NEUTROPHILS # BLD AUTO: 7.7 10E3/UL (ref 1.6–8.3)
NEUTROPHILS NFR BLD AUTO: 62 %
NRBC # BLD AUTO: 0 10E3/UL
NRBC BLD AUTO-RTO: 0 /100
PLATELET # BLD AUTO: 407 10E3/UL (ref 150–450)
POTASSIUM SERPL-SCNC: 4 MMOL/L (ref 3.4–5.3)
PROT SERPL-MCNC: 7.8 G/DL (ref 6.4–8.3)
RBC # BLD AUTO: 4.54 10E6/UL (ref 3.8–5.2)
SODIUM SERPL-SCNC: 135 MMOL/L (ref 136–145)
WBC # BLD AUTO: 12.2 10E3/UL (ref 4–11)

## 2023-07-25 PROCEDURE — 80053 COMPREHEN METABOLIC PANEL: CPT | Performed by: DERMATOLOGY

## 2023-07-25 PROCEDURE — 85004 AUTOMATED DIFF WBC COUNT: CPT | Performed by: FAMILY MEDICINE

## 2023-07-25 PROCEDURE — 85025 COMPLETE CBC W/AUTO DIFF WBC: CPT | Performed by: DERMATOLOGY

## 2023-08-30 ENCOUNTER — LAB REQUISITION (OUTPATIENT)
Dept: LAB | Facility: CLINIC | Age: 33
End: 2023-08-30
Payer: COMMERCIAL

## 2023-08-30 ENCOUNTER — DOCUMENTATION ONLY (OUTPATIENT)
Dept: PHARMACY | Facility: CLINIC | Age: 33
End: 2023-08-30

## 2023-08-30 DIAGNOSIS — L73.2 HIDRADENITIS SUPPURATIVA: ICD-10-CM

## 2023-08-30 LAB
ALBUMIN SERPL BCG-MCNC: 4.1 G/DL (ref 3.5–5.2)
ALP SERPL-CCNC: 80 U/L (ref 35–104)
ALT SERPL W P-5'-P-CCNC: 25 U/L (ref 0–50)
ANION GAP SERPL CALCULATED.3IONS-SCNC: 8 MMOL/L (ref 7–15)
AST SERPL W P-5'-P-CCNC: 22 U/L (ref 0–45)
BASOPHILS # BLD AUTO: 0 10E3/UL (ref 0–0.2)
BASOPHILS NFR BLD AUTO: 1 %
BILIRUB SERPL-MCNC: 0.4 MG/DL
BUN SERPL-MCNC: 13.5 MG/DL (ref 6–20)
CALCIUM SERPL-MCNC: 9.2 MG/DL (ref 8.6–10)
CHLORIDE SERPL-SCNC: 105 MMOL/L (ref 98–107)
CREAT SERPL-MCNC: 0.66 MG/DL (ref 0.51–0.95)
DEPRECATED HCO3 PLAS-SCNC: 25 MMOL/L (ref 22–29)
EOSINOPHIL # BLD AUTO: 0.1 10E3/UL (ref 0–0.7)
EOSINOPHIL NFR BLD AUTO: 2 %
ERYTHROCYTE [DISTWIDTH] IN BLOOD BY AUTOMATED COUNT: 13 % (ref 10–15)
GFR SERPL CREATININE-BSD FRML MDRD: >90 ML/MIN/1.73M2
GLUCOSE SERPL-MCNC: 102 MG/DL (ref 70–99)
HCT VFR BLD AUTO: 39.5 % (ref 35–47)
HGB BLD-MCNC: 13.4 G/DL (ref 11.7–15.7)
IMM GRANULOCYTES # BLD: 0 10E3/UL
IMM GRANULOCYTES NFR BLD: 0 %
LYMPHOCYTES # BLD AUTO: 2.4 10E3/UL (ref 0.8–5.3)
LYMPHOCYTES NFR BLD AUTO: 36 %
MCH RBC QN AUTO: 31.2 PG (ref 26.5–33)
MCHC RBC AUTO-ENTMCNC: 33.9 G/DL (ref 31.5–36.5)
MCV RBC AUTO: 92 FL (ref 78–100)
MONOCYTES # BLD AUTO: 0.5 10E3/UL (ref 0–1.3)
MONOCYTES NFR BLD AUTO: 8 %
NEUTROPHILS # BLD AUTO: 3.6 10E3/UL (ref 1.6–8.3)
NEUTROPHILS NFR BLD AUTO: 53 %
NRBC # BLD AUTO: 0 10E3/UL
NRBC BLD AUTO-RTO: 0 /100
PLATELET # BLD AUTO: 350 10E3/UL (ref 150–450)
POTASSIUM SERPL-SCNC: 4 MMOL/L (ref 3.4–5.3)
PROT SERPL-MCNC: 7 G/DL (ref 6.4–8.3)
RBC # BLD AUTO: 4.3 10E6/UL (ref 3.8–5.2)
SODIUM SERPL-SCNC: 138 MMOL/L (ref 136–145)
WBC # BLD AUTO: 6.8 10E3/UL (ref 4–11)

## 2023-08-30 PROCEDURE — 85004 AUTOMATED DIFF WBC COUNT: CPT | Performed by: DERMATOLOGY

## 2023-08-30 PROCEDURE — 80053 COMPREHEN METABOLIC PANEL: CPT | Performed by: DERMATOLOGY

## 2023-08-30 NOTE — PROGRESS NOTES
Skilled Nurse visit in the [facility] Ambulatory Infusion Suite to administer Inflectra 600 mg in 250 mL NS IV.  No recent elevated temperature, fever, chills, productive cough, coughing for 3 weeks or longer or hemoptysis, abnormal vital signs, night sweats, chest pain. No decrease in appetite, unexplained weight loss or fatigue.  No other new onset medical symptoms.  Current weight 250 lbs.  Peripheral IV left wrist, 1 attempt. Labs drawn pre-infusion: CBC w/diff and CMP as ordered. Infusion completed without complication or reaction. Pt reports therapy is effective in helping to manage symptoms related to therapy.     DANNI YañezN, RN  [phone]  [email or web address]

## 2023-08-31 ENCOUNTER — VIRTUAL VISIT (OUTPATIENT)
Dept: DERMATOLOGY | Facility: CLINIC | Age: 33
End: 2023-08-31
Payer: COMMERCIAL

## 2023-08-31 DIAGNOSIS — L73.2 HIDRADENITIS SUPPURATIVA: Primary | ICD-10-CM

## 2023-08-31 PROCEDURE — 99442 PR PHYSICIAN TELEPHONE EVALUATION 11-20 MIN: CPT | Performed by: DERMATOLOGY

## 2023-08-31 RX ORDER — METHYLPREDNISOLONE SODIUM SUCCINATE 40 MG/ML
40 INJECTION, POWDER, LYOPHILIZED, FOR SOLUTION INTRAMUSCULAR; INTRAVENOUS ONCE
OUTPATIENT
Start: 2023-08-31

## 2023-08-31 RX ORDER — RESORCINOL
POWDER (GRAM) MISCELLANEOUS
Qty: 30 G | Refills: 4 | Status: SHIPPED | OUTPATIENT
Start: 2023-08-31

## 2023-08-31 RX ORDER — SPIRONOLACTONE 100 MG/1
100 TABLET, FILM COATED ORAL 2 TIMES DAILY
Qty: 180 TABLET | Refills: 3 | Status: SHIPPED | OUTPATIENT
Start: 2023-08-31 | End: 2024-04-11

## 2023-08-31 ASSESSMENT — PAIN SCALES - GENERAL: PAINLEVEL: MODERATE PAIN (5)

## 2023-08-31 NOTE — LETTER
8/31/2023       RE: Dahlia Hayes  [address]  [facility] [address]     Dear Colleague,    Thank you for referring your patient, Dahlia Hayes, to the [facility] DERMATOLOG[facility] [place] at [facility]. Please see a copy of my visit note below.    [facility] Dermatology Note  Encounter Date: August 31, 2023  Start: 8:15pm  End: 8:28pm     Dermatology Problem List:  1) Hidradenitis suppurativa  - spironolactone 100 mg, infliximab, doxycycline 100 mg, resorcinol cream  2) Acne  - spironolactone 100 mg  3) Ganglion cyst, left wrist  4) Mirena IUD  SH: Works [employer]ONY Ventura  ____________________________________________     Assessment & Plan:  # Hidradenitis Suppurativa   - Improved  - Continue infliximab infusions every 4 weeks. Will get infliximab level with next infusion  - Continue doxycycline 100 mg twice a day   - Increase spironolactone f to 100mg twice a day  - Resorcinol cream prescribed if flare occurs (sent to [facility])   [facility]  Address: [address] [place] [address], [place] [address]  Phone: [phone]  - Recommend shingles, and pneumonia vaccines with PCP     # Acne vulgaris.    - Continue spironolactone 200 mg Qday      Follow-up: 3 month(s) virtually (telephone with photos), or earlier for new or changing lesions     Staff and Resident:       Karl Baum MD, FAAD    Departments of Internal Medicine and Dermatology  [facility]  [phone]     ____________________________________________     CC: Follow-up HS     HPI:  She notes it has gotten better but had 2 bad flares on buttocks, but now OK.  Under breast there will be small white ones. Nothing painful currently.   Has been getting 5mg./kg every  weks, doxycyline 100mg BID twice a day and spironolactone 100mg daily.   She did not get the resorcinol.     She has done 2 infusions monthly .   Patient  is otherwise feeling well, without additional skin concerns.    - No symptoms or history concerning for TB    No known TB exposure    No recent travel to TB endemic area   No chronic cough, hemoptysis, major weight changes or night sweats.   Not a healthcare worker   Not homeless and has not worked in a homeless shelter        HS Nurse Assessment        12/8/2022     3:16 PM 5/25/2023     3:34 PM 8/31/2023     4:41 PM   Nurse Assessment Data   Over the past 30 days how many old lesions flared back up? 1 4 7   Over the past 30 days how many new lesions did you get? 1 6 2   Over the past week, how many dressing changes do you do each day? 0 2 1   Over the past week, has your wound drainage been: Mild Moderate Moderate   Rate your HS overall from 0 - 10 (0 = no disease, 10 = worst) over the past week:  4 6 4   Rate your pain score from 0 - 10 (0 = no disease, 10 = worst) for the most painful/symptomatic lesion in the past week:  2 5 - Moderate Pain 5 - Moderate Pain   Over the past week, how much has HS influenced your quality of life? slightly very much moderately            Medications:      Current Outpatient Medications   Medication    amphetamine-dextroamphetamine (ADDERALL XR) 10 MG per capsule    amphetamine-dextroamphetamine (ADDERALL XR) 30 MG per capsule    BENZONATATE PO    busPIRone (BUSPAR) 15 MG tablet    doxycycline monohydrate (MONODOX) 100 MG capsule    escitalopram (LEXAPRO) 10 MG tablet    HUMIRA PEN 80 MG/0.8ML pen kit    OXYCODONE HCL PO    PANTOPRAZOLE SODIUM PO    Resorcinol POWD    spironolactone (ALDACTONE) 50 MG tablet      No current facility-administered medications for this visit.                 Past Medical History:       Patient Active Problem List   Diagnosis    Pneumonia    Hidradenitis suppurativa      Past Medical History        Past Medical History:   Diagnosis Date    ADHD (attention deficit hyperactivity disorder)       adderall     Anxiety       lexapro  Dr. Carrillo PN     breast  lumps 2015     benign on mammogram     Chronic tonsillitis

## 2023-09-01 NOTE — PATIENT INSTRUCTIONS
- Continue infliximab infusions every 4 weeks. Will get infliximab level with next infusion  - Continue doxycycline 100 mg twice a day   - Increase spironolactone f to 100mg twice a day  - Resorcinol cream prescribed if flare occurs (sent to [facility] [place])   [facility]  Address: [address] [place] [address], [place] [address]  Phone: [phone]  - Recommend shingles, and pneumonia vaccines with PCP

## 2023-09-01 NOTE — PROGRESS NOTES
[facility] Dermatology Note  Encounter Date: August 31, 2023  Start: 8:15pm  End: 8:28pm     Dermatology Problem List:  1) Hidradenitis suppurativa  - spironolactone 100 mg, infliximab, doxycycline 100 mg, resorcinol cream  2) Acne  - spironolactone 100 mg  3) Ganglion cyst, left wrist  4) Mirena IUD  SH: Alexandre Ventura  ____________________________________________     Assessment & Plan:  # Hidradenitis Suppurativa   - Improved  - Continue infliximab infusions every 4 weeks. Will get infliximab level with next infusion  - Continue doxycycline 100 mg twice a day   - Increase spironolactone f to 100mg twice a day  - Resorcinol cream prescribed if flare occurs (sent to [facility])   [facility]  Address: [address] [place] [address]  Phone: [phone]  - Recommend shingles, and pneumonia vaccines with PCP     # Acne vulgaris.    - Continue spironolactone 200 mg Qday      Follow-up: 3 month(s) virtually (telephone with photos), or earlier for new or changing lesions     Staff and Resident:       Karl Baum MD, FAAD    Departments of Internal Medicine and Dermatology  [facility]  [phone]     ____________________________________________     CC: Follow-up HS     HPI:  She notes it has gotten better but had 2 bad flares on buttocks, but now OK.  Under breast there will be small white ones. Nothing painful currently.   Has been getting 5mg./kg every  weks, doxycyline 100mg BID twice a day and spironolactone 100mg daily.   She did not get the resorcinol.     She has done 2 infusions monthly .   Patient is otherwise feeling well, without additional skin concerns.    - No symptoms or history concerning for TB    No known TB exposure    No recent travel to TB endemic area   No chronic cough, hemoptysis, major weight changes or night sweats.   Not a healthcare worker   Not homeless and has not worked in a homeless  shelter        HS Nurse Assessment        12/8/2022     3:16 PM 5/25/2023     3:34 PM 8/31/2023     4:41 PM   Nurse Assessment Data   Over the past 30 days how many old lesions flared back up? 1 4 7   Over the past 30 days how many new lesions did you get? 1 6 2   Over the past week, how many dressing changes do you do each day? 0 2 1   Over the past week, has your wound drainage been: Mild Moderate Moderate   Rate your HS overall from 0 - 10 (0 = no disease, 10 = worst) over the past week:  4 6 4   Rate your pain score from 0 - 10 (0 = no disease, 10 = worst) for the most painful/symptomatic lesion in the past week:  2 5 - Moderate Pain 5 - Moderate Pain   Over the past week, how much has HS influenced your quality of life? slightly very much moderately            Medications:      Current Outpatient Medications   Medication    amphetamine-dextroamphetamine (ADDERALL XR) 10 MG per capsule    amphetamine-dextroamphetamine (ADDERALL XR) 30 MG per capsule    BENZONATATE PO    busPIRone (BUSPAR) 15 MG tablet    doxycycline monohydrate (MONODOX) 100 MG capsule    escitalopram (LEXAPRO) 10 MG tablet    HUMIRA PEN 80 MG/0.8ML pen kit    OXYCODONE HCL PO    PANTOPRAZOLE SODIUM PO    Resorcinol POWD    spironolactone (ALDACTONE) 50 MG tablet      No current facility-administered medications for this visit.                 Past Medical History:       Patient Active Problem List   Diagnosis    Pneumonia    Hidradenitis suppurativa      Past Medical History        Past Medical History:   Diagnosis Date    ADHD (attention deficit hyperactivity disorder)       adderall     Anxiety       lexapro  Dr. Carrillo PN     breast lumps 2015     benign on mammogram     Chronic tonsillitis

## 2023-09-27 ENCOUNTER — LAB REQUISITION (OUTPATIENT)
Dept: LAB | Facility: CLINIC | Age: 33
End: 2023-09-27
Payer: COMMERCIAL

## 2023-09-27 ENCOUNTER — DOCUMENTATION ONLY (OUTPATIENT)
Dept: PHARMACY | Facility: CLINIC | Age: 33
End: 2023-09-27

## 2023-09-27 DIAGNOSIS — L73.2 HIDRADENITIS SUPPURATIVA: ICD-10-CM

## 2023-09-27 LAB
ALBUMIN SERPL BCG-MCNC: 4.2 G/DL (ref 3.5–5.2)
ALP SERPL-CCNC: 87 U/L (ref 35–104)
ALT SERPL W P-5'-P-CCNC: 34 U/L (ref 0–50)
ANION GAP SERPL CALCULATED.3IONS-SCNC: 13 MMOL/L (ref 7–15)
AST SERPL W P-5'-P-CCNC: 25 U/L (ref 0–45)
BASOPHILS # BLD AUTO: 0 10E3/UL (ref 0–0.2)
BASOPHILS NFR BLD AUTO: 0 %
BILIRUB SERPL-MCNC: 0.5 MG/DL
BUN SERPL-MCNC: 12.5 MG/DL (ref 6–20)
CALCIUM SERPL-MCNC: 9.3 MG/DL (ref 8.6–10)
CHLORIDE SERPL-SCNC: 100 MMOL/L (ref 98–107)
CREAT SERPL-MCNC: 0.67 MG/DL (ref 0.51–0.95)
DEPRECATED HCO3 PLAS-SCNC: 23 MMOL/L (ref 22–29)
EGFRCR SERPLBLD CKD-EPI 2021: >90 ML/MIN/1.73M2
EOSINOPHIL # BLD AUTO: 0.1 10E3/UL (ref 0–0.7)
EOSINOPHIL NFR BLD AUTO: 1 %
ERYTHROCYTE [DISTWIDTH] IN BLOOD BY AUTOMATED COUNT: 12.5 % (ref 10–15)
GLUCOSE SERPL-MCNC: 153 MG/DL (ref 70–99)
HCT VFR BLD AUTO: 41.1 % (ref 35–47)
HGB BLD-MCNC: 13.9 G/DL (ref 11.7–15.7)
IMM GRANULOCYTES # BLD: 0 10E3/UL
IMM GRANULOCYTES NFR BLD: 0 %
LYMPHOCYTES # BLD AUTO: 2.8 10E3/UL (ref 0.8–5.3)
LYMPHOCYTES NFR BLD AUTO: 30 %
MCH RBC QN AUTO: 31.2 PG (ref 26.5–33)
MCHC RBC AUTO-ENTMCNC: 33.8 G/DL (ref 31.5–36.5)
MCV RBC AUTO: 92 FL (ref 78–100)
MONOCYTES # BLD AUTO: 0.6 10E3/UL (ref 0–1.3)
MONOCYTES NFR BLD AUTO: 6 %
NEUTROPHILS # BLD AUTO: 5.6 10E3/UL (ref 1.6–8.3)
NEUTROPHILS NFR BLD AUTO: 63 %
NRBC # BLD AUTO: 0 10E3/UL
NRBC BLD AUTO-RTO: 0 /100
PLATELET # BLD AUTO: 355 10E3/UL (ref 150–450)
POTASSIUM SERPL-SCNC: 3.8 MMOL/L (ref 3.4–5.3)
PROT SERPL-MCNC: 7.4 G/DL (ref 6.4–8.3)
RBC # BLD AUTO: 4.45 10E6/UL (ref 3.8–5.2)
SODIUM SERPL-SCNC: 136 MMOL/L (ref 135–145)
WBC # BLD AUTO: 9.1 10E3/UL (ref 4–11)

## 2023-09-27 PROCEDURE — 85025 COMPLETE CBC W/AUTO DIFF WBC: CPT | Performed by: DERMATOLOGY

## 2023-09-27 PROCEDURE — 80053 COMPREHEN METABOLIC PANEL: CPT | Performed by: DERMATOLOGY

## 2023-09-27 NOTE — PROGRESS NOTES
Skilled Nurse visit in the [facility] Ambulatory Infusion Site to administer Inflectra.  No recent elevated temperature, fever, chills, productive cough, coughing for 3 weeks or longer or hemoptysis, abnormal vital signs, night sweats, chest pain. No  decrease in your appetite, unexplained weight loss or fatigue.  No other new onset medical symptoms.  Current weight 260 lbs.  Peripheral IVright Hand, 1 attempt No pre medication given. Labs drawn CBC . Infusion completed with complication or reaction. Pt reports therapy iseffective in managing symptoms related to therapy.    RAMONE Harley RN, BSN, B.S., [facility]  Resource Nurse  [facility]fusion  [facility], [address] [address]  [email or web address]

## 2023-10-16 DIAGNOSIS — L73.2 HIDRADENITIS SUPPURATIVA: ICD-10-CM

## 2023-10-17 NOTE — TELEPHONE ENCOUNTER
doxycycline monohydrate (MONODOX) 100 MG capsule   60 capsule 4 9/15/2022     Last Office Visit : 8-  Future Office visit:  none

## 2023-10-19 RX ORDER — DOXYCYCLINE 100 MG/1
100 CAPSULE ORAL 2 TIMES DAILY
Qty: 180 CAPSULE | Refills: 1 | Status: SHIPPED | OUTPATIENT
Start: 2023-10-19 | End: 2024-04-11

## 2023-10-25 ENCOUNTER — LAB REQUISITION (OUTPATIENT)
Dept: LAB | Facility: CLINIC | Age: 33
End: 2023-10-25
Payer: COMMERCIAL

## 2023-10-25 ENCOUNTER — DOCUMENTATION ONLY (OUTPATIENT)
Dept: PHARMACY | Facility: CLINIC | Age: 33
End: 2023-10-25

## 2023-10-25 DIAGNOSIS — L73.2 HIDRADENITIS SUPPURATIVA: ICD-10-CM

## 2023-10-25 LAB
ALBUMIN SERPL BCG-MCNC: 3.9 G/DL (ref 3.5–5.2)
ALP SERPL-CCNC: ABNORMAL U/L
ALT SERPL W P-5'-P-CCNC: ABNORMAL U/L
ANION GAP SERPL CALCULATED.3IONS-SCNC: 13 MMOL/L (ref 7–15)
AST SERPL W P-5'-P-CCNC: ABNORMAL U/L
BASOPHILS # BLD AUTO: 0 10E3/UL (ref 0–0.2)
BASOPHILS NFR BLD AUTO: 0 %
BILIRUB SERPL-MCNC: 0.3 MG/DL
BUN SERPL-MCNC: 13.5 MG/DL (ref 6–20)
CALCIUM SERPL-MCNC: 9.3 MG/DL (ref 8.6–10)
CHLORIDE SERPL-SCNC: 103 MMOL/L (ref 98–107)
CREAT SERPL-MCNC: 0.6 MG/DL (ref 0.51–0.95)
DEPRECATED HCO3 PLAS-SCNC: 20 MMOL/L (ref 22–29)
EGFRCR SERPLBLD CKD-EPI 2021: >90 ML/MIN/1.73M2
EOSINOPHIL # BLD AUTO: 0.1 10E3/UL (ref 0–0.7)
EOSINOPHIL NFR BLD AUTO: 1 %
ERYTHROCYTE [DISTWIDTH] IN BLOOD BY AUTOMATED COUNT: 12.7 % (ref 10–15)
GLUCOSE SERPL-MCNC: 126 MG/DL (ref 70–99)
HCT VFR BLD AUTO: 40.6 % (ref 35–47)
HGB BLD-MCNC: 13.5 G/DL (ref 11.7–15.7)
IMM GRANULOCYTES # BLD: 0.1 10E3/UL
IMM GRANULOCYTES NFR BLD: 1 %
LYMPHOCYTES # BLD AUTO: 3 10E3/UL (ref 0.8–5.3)
LYMPHOCYTES NFR BLD AUTO: 32 %
MCH RBC QN AUTO: 30.9 PG (ref 26.5–33)
MCHC RBC AUTO-ENTMCNC: 33.3 G/DL (ref 31.5–36.5)
MCV RBC AUTO: 93 FL (ref 78–100)
MONOCYTES # BLD AUTO: 0.6 10E3/UL (ref 0–1.3)
MONOCYTES NFR BLD AUTO: 7 %
NEUTROPHILS # BLD AUTO: 5.6 10E3/UL (ref 1.6–8.3)
NEUTROPHILS NFR BLD AUTO: 59 %
NRBC # BLD AUTO: 0 10E3/UL
NRBC BLD AUTO-RTO: 0 /100
PLATELET # BLD AUTO: 383 10E3/UL (ref 150–450)
POTASSIUM SERPL-SCNC: 4.8 MMOL/L (ref 3.4–5.3)
PROT SERPL-MCNC: 7.5 G/DL (ref 6.4–8.3)
RBC # BLD AUTO: 4.37 10E6/UL (ref 3.8–5.2)
SODIUM SERPL-SCNC: 136 MMOL/L (ref 135–145)
WBC # BLD AUTO: 9.4 10E3/UL (ref 4–11)

## 2023-10-25 PROCEDURE — 84155 ASSAY OF PROTEIN SERUM: CPT | Performed by: DERMATOLOGY

## 2023-10-25 PROCEDURE — 85025 COMPLETE CBC W/AUTO DIFF WBC: CPT | Performed by: DERMATOLOGY

## 2023-10-25 NOTE — PROGRESS NOTES
Skilled Nurse visit in the [facility] Ambulatory Infusion Suite to administer Inflectra 600 mg in 250 mL NS IV.  No recent elevated temperature, fever, chills, productive cough, coughing for 3 weeks or longer or hemoptysis, abnormal vital signs, night sweats, chest pain. No decrease in appetite, unexplained weight loss or fatigue.  No other new onset medical symptoms.  Current weight 260 lbs.  Peripheral IV left hand, 1 attempt. Labs drawn pre-infusion: CBC w/diff and CMP as ordered. Infusion completed without complication or reaction. Pt reports therapy is effective in helping to manage symptoms related to therapy.      Erika Vang BSN, RN  [phone]  [email or web address]

## 2023-11-22 ENCOUNTER — LAB REQUISITION (OUTPATIENT)
Dept: LAB | Facility: CLINIC | Age: 33
End: 2023-11-22
Payer: COMMERCIAL

## 2023-11-22 DIAGNOSIS — L73.2 HIDRADENITIS SUPPURATIVA: ICD-10-CM

## 2023-11-22 LAB
ALBUMIN SERPL BCG-MCNC: 4.3 G/DL (ref 3.5–5.2)
ALP SERPL-CCNC: 89 U/L (ref 40–150)
ALT SERPL W P-5'-P-CCNC: 28 U/L (ref 0–50)
ANION GAP SERPL CALCULATED.3IONS-SCNC: 9 MMOL/L (ref 7–15)
AST SERPL W P-5'-P-CCNC: 26 U/L (ref 0–45)
BASOPHILS # BLD AUTO: 0.1 10E3/UL (ref 0–0.2)
BASOPHILS NFR BLD AUTO: 1 %
BILIRUB SERPL-MCNC: 0.4 MG/DL
BUN SERPL-MCNC: 10.4 MG/DL (ref 6–20)
CALCIUM SERPL-MCNC: 9.4 MG/DL (ref 8.6–10)
CHLORIDE SERPL-SCNC: 100 MMOL/L (ref 98–107)
CREAT SERPL-MCNC: 0.63 MG/DL (ref 0.51–0.95)
DEPRECATED HCO3 PLAS-SCNC: 24 MMOL/L (ref 22–29)
EGFRCR SERPLBLD CKD-EPI 2021: >90 ML/MIN/1.73M2
EOSINOPHIL # BLD AUTO: 0.1 10E3/UL (ref 0–0.7)
EOSINOPHIL NFR BLD AUTO: 1 %
ERYTHROCYTE [DISTWIDTH] IN BLOOD BY AUTOMATED COUNT: 12.8 % (ref 10–15)
GLUCOSE SERPL-MCNC: 118 MG/DL (ref 70–99)
HCT VFR BLD AUTO: 40.9 % (ref 35–47)
HGB BLD-MCNC: 14.2 G/DL (ref 11.7–15.7)
IMM GRANULOCYTES # BLD: 0.1 10E3/UL
IMM GRANULOCYTES NFR BLD: 1 %
LYMPHOCYTES # BLD AUTO: 3 10E3/UL (ref 0.8–5.3)
LYMPHOCYTES NFR BLD AUTO: 29 %
MCH RBC QN AUTO: 31.8 PG (ref 26.5–33)
MCHC RBC AUTO-ENTMCNC: 34.7 G/DL (ref 31.5–36.5)
MCV RBC AUTO: 92 FL (ref 78–100)
MONOCYTES # BLD AUTO: 0.8 10E3/UL (ref 0–1.3)
MONOCYTES NFR BLD AUTO: 7 %
NEUTROPHILS # BLD AUTO: 6.4 10E3/UL (ref 1.6–8.3)
NEUTROPHILS NFR BLD AUTO: 61 %
NRBC # BLD AUTO: 0 10E3/UL
NRBC BLD AUTO-RTO: 0 /100
PLATELET # BLD AUTO: 382 10E3/UL (ref 150–450)
POTASSIUM SERPL-SCNC: 4.3 MMOL/L (ref 3.4–5.3)
PROT SERPL-MCNC: 7.4 G/DL (ref 6.4–8.3)
RBC # BLD AUTO: 4.47 10E6/UL (ref 3.8–5.2)
SODIUM SERPL-SCNC: 133 MMOL/L (ref 135–145)
WBC # BLD AUTO: 10.4 10E3/UL (ref 4–11)

## 2023-11-22 PROCEDURE — 80053 COMPREHEN METABOLIC PANEL: CPT | Performed by: DERMATOLOGY

## 2023-11-22 PROCEDURE — 85004 AUTOMATED DIFF WBC COUNT: CPT | Performed by: DERMATOLOGY

## 2023-12-05 ENCOUNTER — TELEPHONE (OUTPATIENT)
Dept: DERMATOLOGY | Facility: CLINIC | Age: 33
End: 2023-12-05
Payer: COMMERCIAL

## 2023-12-05 DIAGNOSIS — L73.2 HIDRADENITIS SUPPURATIVA: Primary | ICD-10-CM

## 2023-12-20 ENCOUNTER — DOCUMENTATION ONLY (OUTPATIENT)
Dept: PHARMACY | Facility: CLINIC | Age: 33
End: 2023-12-20

## 2023-12-20 ENCOUNTER — LAB REQUISITION (OUTPATIENT)
Dept: LAB | Facility: CLINIC | Age: 33
End: 2023-12-20
Payer: COMMERCIAL

## 2023-12-20 DIAGNOSIS — L73.2 HIDRADENITIS SUPPURATIVA: ICD-10-CM

## 2023-12-20 LAB
ALBUMIN SERPL BCG-MCNC: 4 G/DL (ref 3.5–5.2)
ALP SERPL-CCNC: 80 U/L (ref 40–150)
ALT SERPL W P-5'-P-CCNC: 35 U/L (ref 0–50)
ANION GAP SERPL CALCULATED.3IONS-SCNC: 14 MMOL/L (ref 7–15)
AST SERPL W P-5'-P-CCNC: 26 U/L (ref 0–45)
BASOPHILS # BLD AUTO: 0 10E3/UL (ref 0–0.2)
BASOPHILS NFR BLD AUTO: 0 %
BILIRUB SERPL-MCNC: 0.4 MG/DL
BUN SERPL-MCNC: 11.5 MG/DL (ref 6–20)
CALCIUM SERPL-MCNC: 8.9 MG/DL (ref 8.6–10)
CHLORIDE SERPL-SCNC: 101 MMOL/L (ref 98–107)
CREAT SERPL-MCNC: 0.65 MG/DL (ref 0.51–0.95)
DEPRECATED HCO3 PLAS-SCNC: 22 MMOL/L (ref 22–29)
EGFRCR SERPLBLD CKD-EPI 2021: >90 ML/MIN/1.73M2
EOSINOPHIL # BLD AUTO: 0.1 10E3/UL (ref 0–0.7)
EOSINOPHIL NFR BLD AUTO: 1 %
ERYTHROCYTE [DISTWIDTH] IN BLOOD BY AUTOMATED COUNT: 12.4 % (ref 10–15)
GLUCOSE SERPL-MCNC: 145 MG/DL (ref 70–99)
HCT VFR BLD AUTO: 39.8 % (ref 35–47)
HGB BLD-MCNC: 13.9 G/DL (ref 11.7–15.7)
IMM GRANULOCYTES # BLD: 0.1 10E3/UL
IMM GRANULOCYTES NFR BLD: 1 %
LYMPHOCYTES # BLD AUTO: 2.7 10E3/UL (ref 0.8–5.3)
LYMPHOCYTES NFR BLD AUTO: 33 %
MCH RBC QN AUTO: 31.9 PG (ref 26.5–33)
MCHC RBC AUTO-ENTMCNC: 34.9 G/DL (ref 31.5–36.5)
MCV RBC AUTO: 91 FL (ref 78–100)
MONOCYTES # BLD AUTO: 0.6 10E3/UL (ref 0–1.3)
MONOCYTES NFR BLD AUTO: 7 %
NEUTROPHILS # BLD AUTO: 4.9 10E3/UL (ref 1.6–8.3)
NEUTROPHILS NFR BLD AUTO: 58 %
NRBC # BLD AUTO: 0 10E3/UL
NRBC BLD AUTO-RTO: 0 /100
PLATELET # BLD AUTO: 341 10E3/UL (ref 150–450)
POTASSIUM SERPL-SCNC: 3.9 MMOL/L (ref 3.4–5.3)
PROT SERPL-MCNC: 7.2 G/DL (ref 6.4–8.3)
RBC # BLD AUTO: 4.36 10E6/UL (ref 3.8–5.2)
SODIUM SERPL-SCNC: 137 MMOL/L (ref 135–145)
WBC # BLD AUTO: 8.4 10E3/UL (ref 4–11)

## 2023-12-20 PROCEDURE — 80053 COMPREHEN METABOLIC PANEL: CPT | Performed by: DERMATOLOGY

## 2023-12-20 PROCEDURE — 84999 UNLISTED CHEMISTRY PROCEDURE: CPT | Performed by: DERMATOLOGY

## 2023-12-20 PROCEDURE — 80230 DRUG ASSAY INFLIXIMAB: CPT | Performed by: DERMATOLOGY

## 2023-12-20 PROCEDURE — 99000 SPECIMEN HANDLING OFFICE-LAB: CPT | Performed by: PATHOLOGY

## 2023-12-20 PROCEDURE — 85025 COMPLETE CBC W/AUTO DIFF WBC: CPT | Performed by: DERMATOLOGY

## 2023-12-20 PROCEDURE — 82397 CHEMILUMINESCENT ASSAY: CPT | Performed by: DERMATOLOGY

## 2023-12-20 NOTE — PROGRESS NOTES
Skilled Nurse visit in the [facility] Ambulatory Infusion Site to administer Inflectra 600mg.  No recent elevated temperature, fever, chills, productive cough, coughing for 3 weeks or longer or hemoptysis, abnormal vital signs, night sweats, chest pain. No  decrease in your appetite, unexplained weight loss or fatigue.  No other new onset medical symptoms.  Current weight 265lb.  Peripheral IVright AC, 1attempt Pre medicated with none. Labs drawn Cbc/d, cmp, infliximab level. Infusion completed without complication or reaction. Pt reports therapy is effective in managing symptoms related to therapy.   Monique Vasques RN  [place] home infusion  [email or web address]  [phone]

## 2024-01-05 ENCOUNTER — TELEPHONE (OUTPATIENT)
Dept: DERMATOLOGY | Facility: CLINIC | Age: 34
End: 2024-01-05
Payer: COMMERCIAL

## 2024-01-05 NOTE — TELEPHONE ENCOUNTER
Left pt a msg to call clinic to schedule a phone appt to discuss infliximab levels. Let pt know a msg was sent via [facility] as well.

## 2024-01-17 ENCOUNTER — LAB REQUISITION (OUTPATIENT)
Dept: LAB | Facility: CLINIC | Age: 34
End: 2024-01-17
Payer: COMMERCIAL

## 2024-01-17 DIAGNOSIS — L73.2 HIDRADENITIS SUPPURATIVA: ICD-10-CM

## 2024-01-17 LAB
ALBUMIN SERPL BCG-MCNC: 4.4 G/DL (ref 3.5–5.2)
ALP SERPL-CCNC: 87 U/L (ref 40–150)
ALT SERPL W P-5'-P-CCNC: 37 U/L (ref 0–50)
ANION GAP SERPL CALCULATED.3IONS-SCNC: 12 MMOL/L (ref 7–15)
AST SERPL W P-5'-P-CCNC: ABNORMAL U/L
BASOPHILS # BLD AUTO: 0 10E3/UL (ref 0–0.2)
BASOPHILS NFR BLD AUTO: 1 %
BILIRUB SERPL-MCNC: 0.4 MG/DL
BUN SERPL-MCNC: 11.9 MG/DL (ref 6–20)
CALCIUM SERPL-MCNC: 9.6 MG/DL (ref 8.6–10)
CHLORIDE SERPL-SCNC: 101 MMOL/L (ref 98–107)
CREAT SERPL-MCNC: 0.68 MG/DL (ref 0.51–0.95)
DEPRECATED HCO3 PLAS-SCNC: 23 MMOL/L (ref 22–29)
EGFRCR SERPLBLD CKD-EPI 2021: >90 ML/MIN/1.73M2
EOSINOPHIL # BLD AUTO: 0.1 10E3/UL (ref 0–0.7)
EOSINOPHIL NFR BLD AUTO: 1 %
ERYTHROCYTE [DISTWIDTH] IN BLOOD BY AUTOMATED COUNT: 12.2 % (ref 10–15)
GLUCOSE SERPL-MCNC: 172 MG/DL (ref 70–99)
HCT VFR BLD AUTO: 42.5 % (ref 35–47)
HGB BLD-MCNC: 14.5 G/DL (ref 11.7–15.7)
IMM GRANULOCYTES # BLD: 0.1 10E3/UL
IMM GRANULOCYTES NFR BLD: 1 %
LYMPHOCYTES # BLD AUTO: 2.6 10E3/UL (ref 0.8–5.3)
LYMPHOCYTES NFR BLD AUTO: 31 %
MCH RBC QN AUTO: 31 PG (ref 26.5–33)
MCHC RBC AUTO-ENTMCNC: 34.1 G/DL (ref 31.5–36.5)
MCV RBC AUTO: 91 FL (ref 78–100)
MONOCYTES # BLD AUTO: 0.5 10E3/UL (ref 0–1.3)
MONOCYTES NFR BLD AUTO: 6 %
NEUTROPHILS # BLD AUTO: 5.1 10E3/UL (ref 1.6–8.3)
NEUTROPHILS NFR BLD AUTO: 60 %
NRBC # BLD AUTO: 0 10E3/UL
NRBC BLD AUTO-RTO: 0 /100
PLATELET # BLD AUTO: 366 10E3/UL (ref 150–450)
POTASSIUM SERPL-SCNC: 5.1 MMOL/L (ref 3.4–5.3)
PROT SERPL-MCNC: 8.1 G/DL (ref 6.4–8.3)
RBC # BLD AUTO: 4.67 10E6/UL (ref 3.8–5.2)
SODIUM SERPL-SCNC: 136 MMOL/L (ref 135–145)
WBC # BLD AUTO: 8.4 10E3/UL (ref 4–11)

## 2024-01-17 PROCEDURE — 85025 COMPLETE CBC W/AUTO DIFF WBC: CPT | Performed by: DERMATOLOGY

## 2024-01-17 PROCEDURE — 82040 ASSAY OF SERUM ALBUMIN: CPT | Performed by: DERMATOLOGY

## 2024-01-25 DIAGNOSIS — L73.2 HIDRADENITIS SUPPURATIVA: Primary | ICD-10-CM

## 2024-02-14 ENCOUNTER — LAB REQUISITION (OUTPATIENT)
Dept: LAB | Facility: CLINIC | Age: 34
End: 2024-02-14
Payer: COMMERCIAL

## 2024-02-14 ENCOUNTER — DOCUMENTATION ONLY (OUTPATIENT)
Dept: PHARMACY | Facility: CLINIC | Age: 34
End: 2024-02-14
Payer: COMMERCIAL

## 2024-02-14 DIAGNOSIS — L73.2 HIDRADENITIS SUPPURATIVA: ICD-10-CM

## 2024-02-14 LAB
ALBUMIN SERPL BCG-MCNC: 4.3 G/DL (ref 3.5–5.2)
ALP SERPL-CCNC: 97 U/L (ref 40–150)
ALT SERPL W P-5'-P-CCNC: 30 U/L (ref 0–50)
ANION GAP SERPL CALCULATED.3IONS-SCNC: 13 MMOL/L (ref 7–15)
AST SERPL W P-5'-P-CCNC: 26 U/L (ref 0–45)
BASOPHILS # BLD AUTO: 0.1 10E3/UL (ref 0–0.2)
BASOPHILS NFR BLD AUTO: 1 %
BILIRUB SERPL-MCNC: 0.5 MG/DL
BUN SERPL-MCNC: 12.5 MG/DL (ref 6–20)
CALCIUM SERPL-MCNC: 9.5 MG/DL (ref 8.6–10)
CHLORIDE SERPL-SCNC: 99 MMOL/L (ref 98–107)
CREAT SERPL-MCNC: 0.76 MG/DL (ref 0.51–0.95)
DEPRECATED HCO3 PLAS-SCNC: 23 MMOL/L (ref 22–29)
EGFRCR SERPLBLD CKD-EPI 2021: >90 ML/MIN/1.73M2
EOSINOPHIL # BLD AUTO: 0.1 10E3/UL (ref 0–0.7)
EOSINOPHIL NFR BLD AUTO: 1 %
ERYTHROCYTE [DISTWIDTH] IN BLOOD BY AUTOMATED COUNT: 12.3 % (ref 10–15)
GLUCOSE SERPL-MCNC: 170 MG/DL (ref 70–99)
HBA1C MFR BLD: 5.3 %
HCT VFR BLD AUTO: 43.1 % (ref 35–47)
HGB BLD-MCNC: 14.8 G/DL (ref 11.7–15.7)
IMM GRANULOCYTES # BLD: 0 10E3/UL
IMM GRANULOCYTES NFR BLD: 1 %
LYMPHOCYTES # BLD AUTO: 2.8 10E3/UL (ref 0.8–5.3)
LYMPHOCYTES NFR BLD AUTO: 32 %
MCH RBC QN AUTO: 30.8 PG (ref 26.5–33)
MCHC RBC AUTO-ENTMCNC: 34.3 G/DL (ref 31.5–36.5)
MCV RBC AUTO: 90 FL (ref 78–100)
MONOCYTES # BLD AUTO: 0.6 10E3/UL (ref 0–1.3)
MONOCYTES NFR BLD AUTO: 7 %
NEUTROPHILS # BLD AUTO: 5.2 10E3/UL (ref 1.6–8.3)
NEUTROPHILS NFR BLD AUTO: 58 %
NRBC # BLD AUTO: 0 10E3/UL
NRBC BLD AUTO-RTO: 0 /100
PLATELET # BLD AUTO: 370 10E3/UL (ref 150–450)
POTASSIUM SERPL-SCNC: 4.2 MMOL/L (ref 3.4–5.3)
PROT SERPL-MCNC: 7.8 G/DL (ref 6.4–8.3)
RBC # BLD AUTO: 4.81 10E6/UL (ref 3.8–5.2)
SODIUM SERPL-SCNC: 135 MMOL/L (ref 135–145)
WBC # BLD AUTO: 8.8 10E3/UL (ref 4–11)

## 2024-02-14 PROCEDURE — 80053 COMPREHEN METABOLIC PANEL: CPT | Performed by: DERMATOLOGY

## 2024-02-14 PROCEDURE — 83036 HEMOGLOBIN GLYCOSYLATED A1C: CPT | Performed by: DERMATOLOGY

## 2024-02-14 PROCEDURE — 85025 COMPLETE CBC W/AUTO DIFF WBC: CPT | Performed by: DERMATOLOGY

## 2024-02-14 NOTE — PROGRESS NOTES
Skilled Nurse visit in the [facility] Ambulatory Infusion Site to administer Inflectra infusion.  No recent elevated temperature, fever, chills, productive cough, coughing for 3 weeks or longer or hemoptysis, abnormal vital signs, night sweats, chest pain. No  decrease in your appetite, unexplained weight loss or fatigue.  No other new onset medical symptoms.  Current weight 260lb.  Peripheral IVright Hand, 1attempt Pre medicated with none. Labs drawn Cbc/d, cmp, hgb a1c. Infusion completed without complication or reaction. Pt reports therapy is effective in managing symptoms related to therapy.   Monique Vasques RN  [place] home infusion  [email or web address]  [phone]

## 2024-03-13 ENCOUNTER — LAB REQUISITION (OUTPATIENT)
Dept: LAB | Facility: CLINIC | Age: 34
End: 2024-03-13
Payer: COMMERCIAL

## 2024-03-13 DIAGNOSIS — L73.2 HIDRADENITIS SUPPURATIVA: ICD-10-CM

## 2024-03-13 LAB
ALBUMIN SERPL BCG-MCNC: 4 G/DL (ref 3.5–5.2)
ALP SERPL-CCNC: 87 U/L (ref 40–150)
ALT SERPL W P-5'-P-CCNC: 40 U/L (ref 0–50)
ANION GAP SERPL CALCULATED.3IONS-SCNC: 11 MMOL/L (ref 7–15)
AST SERPL W P-5'-P-CCNC: 32 U/L (ref 0–45)
BASOPHILS # BLD AUTO: 0 10E3/UL (ref 0–0.2)
BASOPHILS NFR BLD AUTO: 1 %
BILIRUB SERPL-MCNC: 0.4 MG/DL
BUN SERPL-MCNC: 12.2 MG/DL (ref 6–20)
CALCIUM SERPL-MCNC: 9 MG/DL (ref 8.6–10)
CHLORIDE SERPL-SCNC: 100 MMOL/L (ref 98–107)
CREAT SERPL-MCNC: 0.68 MG/DL (ref 0.51–0.95)
DEPRECATED HCO3 PLAS-SCNC: 23 MMOL/L (ref 22–29)
EGFRCR SERPLBLD CKD-EPI 2021: >90 ML/MIN/1.73M2
EOSINOPHIL # BLD AUTO: 0.1 10E3/UL (ref 0–0.7)
EOSINOPHIL NFR BLD AUTO: 1 %
ERYTHROCYTE [DISTWIDTH] IN BLOOD BY AUTOMATED COUNT: 12.9 % (ref 10–15)
GLUCOSE SERPL-MCNC: 163 MG/DL (ref 70–99)
HCT VFR BLD AUTO: 41 % (ref 35–47)
HGB BLD-MCNC: 14 G/DL (ref 11.7–15.7)
IMM GRANULOCYTES # BLD: 0.1 10E3/UL
IMM GRANULOCYTES NFR BLD: 1 %
LYMPHOCYTES # BLD AUTO: 2.5 10E3/UL (ref 0.8–5.3)
LYMPHOCYTES NFR BLD AUTO: 30 %
MCH RBC QN AUTO: 30.7 PG (ref 26.5–33)
MCHC RBC AUTO-ENTMCNC: 34.1 G/DL (ref 31.5–36.5)
MCV RBC AUTO: 90 FL (ref 78–100)
MONOCYTES # BLD AUTO: 0.6 10E3/UL (ref 0–1.3)
MONOCYTES NFR BLD AUTO: 7 %
NEUTROPHILS # BLD AUTO: 5 10E3/UL (ref 1.6–8.3)
NEUTROPHILS NFR BLD AUTO: 60 %
NRBC # BLD AUTO: 0 10E3/UL
NRBC BLD AUTO-RTO: 0 /100
PLATELET # BLD AUTO: 360 10E3/UL (ref 150–450)
POTASSIUM SERPL-SCNC: 3.9 MMOL/L (ref 3.4–5.3)
PROT SERPL-MCNC: 7.2 G/DL (ref 6.4–8.3)
RBC # BLD AUTO: 4.56 10E6/UL (ref 3.8–5.2)
SODIUM SERPL-SCNC: 134 MMOL/L (ref 135–145)
WBC # BLD AUTO: 8.3 10E3/UL (ref 4–11)

## 2024-03-13 PROCEDURE — 85025 COMPLETE CBC W/AUTO DIFF WBC: CPT | Performed by: DERMATOLOGY

## 2024-03-13 PROCEDURE — 80053 COMPREHEN METABOLIC PANEL: CPT | Performed by: DERMATOLOGY

## 2024-04-03 ENCOUNTER — TELEPHONE (OUTPATIENT)
Dept: DERMATOLOGY | Facility: CLINIC | Age: 34
End: 2024-04-03
Payer: COMMERCIAL

## 2024-04-03 NOTE — TELEPHONE ENCOUNTER
[facility] Call Center    Phone Message    May a detailed message be left on voicemail: yes     Reason for Call: Other: Patient says she was returning a call. Pt. Is not sure what the call was regarding. Please call Pt. Back to discuss. Thanks.     Action Taken: Message routed to:  [facility] Derm    Travel Screening: Not Applicable

## 2024-04-04 NOTE — TELEPHONE ENCOUNTER
Writer has been unable to connect with pt. Phone visit was scheduled for next Thursday 4/11. Pt notified via . Asked pt to call if does not work.

## 2024-04-10 ENCOUNTER — LAB REQUISITION (OUTPATIENT)
Dept: LAB | Facility: CLINIC | Age: 34
End: 2024-04-10
Payer: COMMERCIAL

## 2024-04-10 ENCOUNTER — INFUSION THERAPY VISIT (OUTPATIENT)
Dept: PHARMACY | Facility: CLINIC | Age: 34
End: 2024-04-10

## 2024-04-10 DIAGNOSIS — L73.2 HIDRADENITIS SUPPURATIVA: ICD-10-CM

## 2024-04-10 LAB
ALBUMIN SERPL BCG-MCNC: 4.2 G/DL (ref 3.5–5.2)
ALP SERPL-CCNC: 95 U/L (ref 40–150)
ALT SERPL W P-5'-P-CCNC: 41 U/L (ref 0–50)
ANION GAP SERPL CALCULATED.3IONS-SCNC: 13 MMOL/L (ref 7–15)
AST SERPL W P-5'-P-CCNC: 30 U/L (ref 0–45)
BASOPHILS # BLD AUTO: 0.1 10E3/UL (ref 0–0.2)
BASOPHILS NFR BLD AUTO: 1 %
BILIRUB SERPL-MCNC: 0.5 MG/DL
BUN SERPL-MCNC: 10.7 MG/DL (ref 6–20)
CALCIUM SERPL-MCNC: 9 MG/DL (ref 8.6–10)
CHLORIDE SERPL-SCNC: 102 MMOL/L (ref 98–107)
CREAT SERPL-MCNC: 0.72 MG/DL (ref 0.51–0.95)
DEPRECATED HCO3 PLAS-SCNC: 20 MMOL/L (ref 22–29)
EGFRCR SERPLBLD CKD-EPI 2021: >90 ML/MIN/1.73M2
EOSINOPHIL # BLD AUTO: 0.2 10E3/UL (ref 0–0.7)
EOSINOPHIL NFR BLD AUTO: 2 %
ERYTHROCYTE [DISTWIDTH] IN BLOOD BY AUTOMATED COUNT: 13 % (ref 10–15)
GLUCOSE SERPL-MCNC: 111 MG/DL (ref 70–99)
HCT VFR BLD AUTO: 41 % (ref 35–47)
HGB BLD-MCNC: 14 G/DL (ref 11.7–15.7)
IMM GRANULOCYTES # BLD: 0 10E3/UL
IMM GRANULOCYTES NFR BLD: 0 %
LYMPHOCYTES # BLD AUTO: 2.8 10E3/UL (ref 0.8–5.3)
LYMPHOCYTES NFR BLD AUTO: 33 %
MCH RBC QN AUTO: 30.6 PG (ref 26.5–33)
MCHC RBC AUTO-ENTMCNC: 34.1 G/DL (ref 31.5–36.5)
MCV RBC AUTO: 90 FL (ref 78–100)
MONOCYTES # BLD AUTO: 0.7 10E3/UL (ref 0–1.3)
MONOCYTES NFR BLD AUTO: 8 %
NEUTROPHILS # BLD AUTO: 4.9 10E3/UL (ref 1.6–8.3)
NEUTROPHILS NFR BLD AUTO: 56 %
NRBC # BLD AUTO: 0 10E3/UL
NRBC BLD AUTO-RTO: 0 /100
PLATELET # BLD AUTO: 353 10E3/UL (ref 150–450)
POTASSIUM SERPL-SCNC: 4 MMOL/L (ref 3.4–5.3)
PROT SERPL-MCNC: 7.3 G/DL (ref 6.4–8.3)
RBC # BLD AUTO: 4.57 10E6/UL (ref 3.8–5.2)
SODIUM SERPL-SCNC: 135 MMOL/L (ref 135–145)
WBC # BLD AUTO: 8.6 10E3/UL (ref 4–11)

## 2024-04-10 PROCEDURE — 80053 COMPREHEN METABOLIC PANEL: CPT | Performed by: DERMATOLOGY

## 2024-04-10 PROCEDURE — 85025 COMPLETE CBC W/AUTO DIFF WBC: CPT | Performed by: DERMATOLOGY

## 2024-04-11 ENCOUNTER — TELEPHONE (OUTPATIENT)
Dept: DERMATOLOGY | Facility: CLINIC | Age: 34
End: 2024-04-11

## 2024-04-11 DIAGNOSIS — L73.2 HIDRADENITIS SUPPURATIVA: ICD-10-CM

## 2024-04-11 DIAGNOSIS — L73.2 HIDRADENITIS SUPPURATIVA: Primary | ICD-10-CM

## 2024-04-11 RX ORDER — DOXYCYCLINE 100 MG/1
100 CAPSULE ORAL 2 TIMES DAILY
Qty: 180 CAPSULE | Refills: 3 | Status: SHIPPED | OUTPATIENT
Start: 2024-04-11

## 2024-04-11 RX ORDER — SPIRONOLACTONE 100 MG/1
100 TABLET, FILM COATED ORAL 2 TIMES DAILY
Qty: 180 TABLET | Refills: 3 | Status: SHIPPED | OUTPATIENT
Start: 2024-04-11

## 2024-04-12 NOTE — TELEPHONE ENCOUNTER
Called pt with results of infliximab levels >40. Very high. Doing very well with infliximab every 4 weeks. Gets one lesion right before. OK to continue doxycyline 100mg BID   and spironolactone 100mg daily. Resorcinol also helpful. Did not get shingles or pneumonia vaccines.

## 2024-05-08 ENCOUNTER — LAB REQUISITION (OUTPATIENT)
Dept: LAB | Facility: CLINIC | Age: 34
End: 2024-05-08
Payer: COMMERCIAL

## 2024-05-08 ENCOUNTER — DOCUMENTATION ONLY (OUTPATIENT)
Dept: PHARMACY | Facility: CLINIC | Age: 34
End: 2024-05-08
Payer: COMMERCIAL

## 2024-05-08 DIAGNOSIS — L73.2 HIDRADENITIS SUPPURATIVA: ICD-10-CM

## 2024-05-08 LAB
ALBUMIN SERPL BCG-MCNC: 4.2 G/DL (ref 3.5–5.2)
ALP SERPL-CCNC: 98 U/L (ref 40–150)
ALT SERPL W P-5'-P-CCNC: 44 U/L (ref 0–50)
ANION GAP SERPL CALCULATED.3IONS-SCNC: 13 MMOL/L (ref 7–15)
AST SERPL W P-5'-P-CCNC: 32 U/L (ref 0–45)
BASOPHILS # BLD AUTO: 0 10E3/UL (ref 0–0.2)
BASOPHILS NFR BLD AUTO: 0 %
BILIRUB SERPL-MCNC: 0.5 MG/DL
BUN SERPL-MCNC: 9.7 MG/DL (ref 6–20)
CALCIUM SERPL-MCNC: 9.1 MG/DL (ref 8.6–10)
CHLORIDE SERPL-SCNC: 100 MMOL/L (ref 98–107)
CREAT SERPL-MCNC: 0.71 MG/DL (ref 0.51–0.95)
DEPRECATED HCO3 PLAS-SCNC: 24 MMOL/L (ref 22–29)
EGFRCR SERPLBLD CKD-EPI 2021: >90 ML/MIN/1.73M2
EOSINOPHIL # BLD AUTO: 0.1 10E3/UL (ref 0–0.7)
EOSINOPHIL NFR BLD AUTO: 2 %
ERYTHROCYTE [DISTWIDTH] IN BLOOD BY AUTOMATED COUNT: 13 % (ref 10–15)
GLUCOSE SERPL-MCNC: 131 MG/DL (ref 70–99)
HCT VFR BLD AUTO: 41.4 % (ref 35–47)
HGB BLD-MCNC: 14.2 G/DL (ref 11.7–15.7)
IMM GRANULOCYTES # BLD: 0 10E3/UL
IMM GRANULOCYTES NFR BLD: 0 %
LYMPHOCYTES # BLD AUTO: 2.8 10E3/UL (ref 0.8–5.3)
LYMPHOCYTES NFR BLD AUTO: 32 %
MCH RBC QN AUTO: 31.5 PG (ref 26.5–33)
MCHC RBC AUTO-ENTMCNC: 34.3 G/DL (ref 31.5–36.5)
MCV RBC AUTO: 92 FL (ref 78–100)
MONOCYTES # BLD AUTO: 0.6 10E3/UL (ref 0–1.3)
MONOCYTES NFR BLD AUTO: 6 %
NEUTROPHILS # BLD AUTO: 5.2 10E3/UL (ref 1.6–8.3)
NEUTROPHILS NFR BLD AUTO: 60 %
NRBC # BLD AUTO: 0 10E3/UL
NRBC BLD AUTO-RTO: 0 /100
PLATELET # BLD AUTO: 357 10E3/UL (ref 150–450)
POTASSIUM SERPL-SCNC: 3.8 MMOL/L (ref 3.4–5.3)
PROT SERPL-MCNC: 7.4 G/DL (ref 6.4–8.3)
RBC # BLD AUTO: 4.51 10E6/UL (ref 3.8–5.2)
SODIUM SERPL-SCNC: 137 MMOL/L (ref 135–145)
WBC # BLD AUTO: 8.7 10E3/UL (ref 4–11)

## 2024-05-08 PROCEDURE — 85025 COMPLETE CBC W/AUTO DIFF WBC: CPT | Performed by: DERMATOLOGY

## 2024-05-08 PROCEDURE — 80053 COMPREHEN METABOLIC PANEL: CPT | Performed by: DERMATOLOGY

## 2024-05-08 NOTE — PROGRESS NOTES
Skilled Nurse visit in the [facility] Ambulatory Infusion Site to administer Inflectra 600mg.  No recent elevated temperature, fever, chills, productive cough, coughing for 3 weeks or longer or hemoptysis, abnormal vital signs, night sweats, chest pain. No  decrease in your appetite, unexplained weight loss or fatigue.  No other new onset medical symptoms.  Current weight 270lb.  Peripheral IVleft Lower Forearm, 1attempt Pre medicated with none. Labs drawn Cbc/d, CMP. Infusion completed without complication or reaction. Pt reports therapy is effective in managing symptoms related to therapy.   Monique Vasques RN  [place] home infusion  [email or web address]  [phone]

## 2024-06-05 ENCOUNTER — INFUSION THERAPY VISIT (OUTPATIENT)
Dept: PHARMACY | Facility: CLINIC | Age: 34
End: 2024-06-05

## 2024-06-05 ENCOUNTER — LAB REQUISITION (OUTPATIENT)
Dept: LAB | Facility: CLINIC | Age: 34
End: 2024-06-05
Payer: COMMERCIAL

## 2024-06-05 DIAGNOSIS — L73.2 HIDRADENITIS SUPPURATIVA: ICD-10-CM

## 2024-06-05 LAB
ALBUMIN SERPL BCG-MCNC: 4.1 G/DL (ref 3.5–5.2)
ALP SERPL-CCNC: 91 U/L (ref 40–150)
ALT SERPL W P-5'-P-CCNC: 48 U/L (ref 0–50)
ANION GAP SERPL CALCULATED.3IONS-SCNC: 13 MMOL/L (ref 7–15)
AST SERPL W P-5'-P-CCNC: 33 U/L (ref 0–45)
BASOPHILS # BLD AUTO: 0 10E3/UL (ref 0–0.2)
BASOPHILS NFR BLD AUTO: 0 %
BILIRUB SERPL-MCNC: 0.4 MG/DL
BUN SERPL-MCNC: 9.2 MG/DL (ref 6–20)
CALCIUM SERPL-MCNC: 8.9 MG/DL (ref 8.6–10)
CHLORIDE SERPL-SCNC: 101 MMOL/L (ref 98–107)
CREAT SERPL-MCNC: 0.73 MG/DL (ref 0.51–0.95)
DEPRECATED HCO3 PLAS-SCNC: 22 MMOL/L (ref 22–29)
EGFRCR SERPLBLD CKD-EPI 2021: >90 ML/MIN/1.73M2
EOSINOPHIL # BLD AUTO: 0.1 10E3/UL (ref 0–0.7)
EOSINOPHIL NFR BLD AUTO: 1 %
ERYTHROCYTE [DISTWIDTH] IN BLOOD BY AUTOMATED COUNT: 12.8 % (ref 10–15)
GLUCOSE SERPL-MCNC: 103 MG/DL (ref 70–99)
HCT VFR BLD AUTO: 41.3 % (ref 35–47)
HGB BLD-MCNC: 14.3 G/DL (ref 11.7–15.7)
IMM GRANULOCYTES # BLD: 0.1 10E3/UL
IMM GRANULOCYTES NFR BLD: 1 %
LYMPHOCYTES # BLD AUTO: 2.6 10E3/UL (ref 0.8–5.3)
LYMPHOCYTES NFR BLD AUTO: 28 %
MCH RBC QN AUTO: 31.7 PG (ref 26.5–33)
MCHC RBC AUTO-ENTMCNC: 34.6 G/DL (ref 31.5–36.5)
MCV RBC AUTO: 92 FL (ref 78–100)
MONOCYTES # BLD AUTO: 0.7 10E3/UL (ref 0–1.3)
MONOCYTES NFR BLD AUTO: 8 %
NEUTROPHILS # BLD AUTO: 5.7 10E3/UL (ref 1.6–8.3)
NEUTROPHILS NFR BLD AUTO: 62 %
NRBC # BLD AUTO: 0 10E3/UL
NRBC BLD AUTO-RTO: 0 /100
PLATELET # BLD AUTO: 344 10E3/UL (ref 150–450)
POTASSIUM SERPL-SCNC: 3.8 MMOL/L (ref 3.4–5.3)
PROT SERPL-MCNC: 7.5 G/DL (ref 6.4–8.3)
RBC # BLD AUTO: 4.51 10E6/UL (ref 3.8–5.2)
SODIUM SERPL-SCNC: 136 MMOL/L (ref 135–145)
WBC # BLD AUTO: 9.1 10E3/UL (ref 4–11)

## 2024-06-05 PROCEDURE — 82040 ASSAY OF SERUM ALBUMIN: CPT | Performed by: DERMATOLOGY

## 2024-06-05 PROCEDURE — 85041 AUTOMATED RBC COUNT: CPT | Performed by: DERMATOLOGY

## 2024-06-05 NOTE — PROCEDURE: HIGH RISK MEDICATION MONITORING
Detail Level: Detailed Itraconazole Counseling:  I discussed with the patient the risks of itraconazole including but not limited to liver damage, nausea/vomiting, neuropathy, and severe allergy.  The patient understands that this medication is best absorbed when taken with acidic beverages such as non-diet cola or ginger ale.  The patient understands that monitoring is required including baseline LFTs and repeat LFTs at intervals.  The patient understands that they are to contact us or the primary physician if concerning signs are noted.

## 2024-06-05 NOTE — PROGRESS NOTES
Skilled Nurse visit in the [facility] Ambulatory Infusion Site to administer Inflectra.  No recent elevated temperature, fever, chills, productive cough, coughing for 3 weeks or longer or hemoptysis, abnormal vital signs, night sweats, chest pain. No  decrease in your appetite, unexplained weight loss or fatigue.  No other new onset medical symptoms.  Current weight 260 lbs.  Peripheral IVleft Hand, 1attempt Pre medicated with NA. Labs drawn CBC with diff and CMP. Infusion completed without complication or reaction. Pt reports therapy iseffective in managing symptoms related to therapy.

## 2024-07-03 ENCOUNTER — DOCUMENTATION ONLY (OUTPATIENT)
Dept: PHARMACY | Facility: CLINIC | Age: 34
End: 2024-07-03

## 2024-07-03 ENCOUNTER — LAB REQUISITION (OUTPATIENT)
Dept: LAB | Facility: CLINIC | Age: 34
End: 2024-07-03
Payer: COMMERCIAL

## 2024-07-03 DIAGNOSIS — L73.2 HIDRADENITIS SUPPURATIVA: ICD-10-CM

## 2024-07-03 LAB
ALBUMIN SERPL BCG-MCNC: 4.1 G/DL (ref 3.5–5.2)
ALP SERPL-CCNC: 91 U/L (ref 40–150)
ALT SERPL W P-5'-P-CCNC: 86 U/L (ref 0–50)
ANION GAP SERPL CALCULATED.3IONS-SCNC: 11 MMOL/L (ref 7–15)
AST SERPL W P-5'-P-CCNC: 65 U/L (ref 0–45)
BASOPHILS # BLD AUTO: 0 10E3/UL (ref 0–0.2)
BASOPHILS NFR BLD AUTO: 1 %
BILIRUB SERPL-MCNC: 0.5 MG/DL
BUN SERPL-MCNC: 11.3 MG/DL (ref 6–20)
CALCIUM SERPL-MCNC: 9.3 MG/DL (ref 8.6–10)
CHLORIDE SERPL-SCNC: 104 MMOL/L (ref 98–107)
CREAT SERPL-MCNC: 0.69 MG/DL (ref 0.51–0.95)
DEPRECATED HCO3 PLAS-SCNC: 21 MMOL/L (ref 22–29)
EGFRCR SERPLBLD CKD-EPI 2021: >90 ML/MIN/1.73M2
EOSINOPHIL # BLD AUTO: 0.2 10E3/UL (ref 0–0.7)
EOSINOPHIL NFR BLD AUTO: 2 %
ERYTHROCYTE [DISTWIDTH] IN BLOOD BY AUTOMATED COUNT: 12.8 % (ref 10–15)
GLUCOSE SERPL-MCNC: 114 MG/DL (ref 70–99)
HCT VFR BLD AUTO: 40.9 % (ref 35–47)
HGB BLD-MCNC: 14.1 G/DL (ref 11.7–15.7)
IMM GRANULOCYTES # BLD: 0 10E3/UL
IMM GRANULOCYTES NFR BLD: 1 %
LYMPHOCYTES # BLD AUTO: 2.4 10E3/UL (ref 0.8–5.3)
LYMPHOCYTES NFR BLD AUTO: 30 %
MCH RBC QN AUTO: 31.4 PG (ref 26.5–33)
MCHC RBC AUTO-ENTMCNC: 34.5 G/DL (ref 31.5–36.5)
MCV RBC AUTO: 91 FL (ref 78–100)
MONOCYTES # BLD AUTO: 0.6 10E3/UL (ref 0–1.3)
MONOCYTES NFR BLD AUTO: 8 %
NEUTROPHILS # BLD AUTO: 4.7 10E3/UL (ref 1.6–8.3)
NEUTROPHILS NFR BLD AUTO: 58 %
NRBC # BLD AUTO: 0 10E3/UL
NRBC BLD AUTO-RTO: 0 /100
PLATELET # BLD AUTO: 374 10E3/UL (ref 150–450)
POTASSIUM SERPL-SCNC: 3.9 MMOL/L (ref 3.4–5.3)
PROT SERPL-MCNC: 7.4 G/DL (ref 6.4–8.3)
RBC # BLD AUTO: 4.49 10E6/UL (ref 3.8–5.2)
SODIUM SERPL-SCNC: 136 MMOL/L (ref 135–145)
WBC # BLD AUTO: 7.9 10E3/UL (ref 4–11)

## 2024-07-03 PROCEDURE — 85025 COMPLETE CBC W/AUTO DIFF WBC: CPT | Performed by: DERMATOLOGY

## 2024-07-03 PROCEDURE — 82374 ASSAY BLOOD CARBON DIOXIDE: CPT | Performed by: DERMATOLOGY

## 2024-07-03 PROCEDURE — 84460 ALANINE AMINO (ALT) (SGPT): CPT | Performed by: DERMATOLOGY

## 2024-07-03 NOTE — PROGRESS NOTES
Skilled Nurse visit in the [facility] Ambulatory Infusion Site to administer Inflectra.  No recent elevated temperature, fever, chills, productive cough, coughing for 3 weeks or longer or hemoptysis, abnormal vital signs, night sweats, chest pain. No  decrease in your appetite, unexplained weight loss or fatigue.  No other new onset medical symptoms.  Current weight 260 lbs.  Peripheral IVleft Hand, 1 attempt. Labs drawn CBC with diff and CMP at 1022 sent to [facility]. Infusion completed without complication or reaction. Pt reports therapy is effective in managing symptoms related to therapy.    [email or web address]

## 2024-07-04 DIAGNOSIS — L73.2 HIDRADENITIS SUPPURATIVA: Primary | ICD-10-CM

## 2024-07-31 ENCOUNTER — LAB REQUISITION (OUTPATIENT)
Dept: LAB | Facility: CLINIC | Age: 34
End: 2024-07-31
Payer: COMMERCIAL

## 2024-07-31 ENCOUNTER — DOCUMENTATION ONLY (OUTPATIENT)
Dept: PHARMACY | Facility: CLINIC | Age: 34
End: 2024-07-31

## 2024-07-31 DIAGNOSIS — L73.2 HIDRADENITIS SUPPURATIVA: ICD-10-CM

## 2024-07-31 LAB
ALBUMIN SERPL BCG-MCNC: 4.3 G/DL (ref 3.5–5.2)
ALP SERPL-CCNC: 97 U/L (ref 40–150)
ALT SERPL W P-5'-P-CCNC: 79 U/L (ref 0–50)
ANION GAP SERPL CALCULATED.3IONS-SCNC: 14 MMOL/L (ref 7–15)
AST SERPL W P-5'-P-CCNC: 55 U/L (ref 0–45)
BASOPHILS # BLD AUTO: 0.1 10E3/UL (ref 0–0.2)
BASOPHILS NFR BLD AUTO: 1 %
BILIRUB SERPL-MCNC: 0.4 MG/DL
BUN SERPL-MCNC: 9.8 MG/DL (ref 6–20)
CALCIUM SERPL-MCNC: 9.3 MG/DL (ref 8.8–10.4)
CHLORIDE SERPL-SCNC: 102 MMOL/L (ref 98–107)
CREAT SERPL-MCNC: 0.74 MG/DL (ref 0.51–0.95)
EGFRCR SERPLBLD CKD-EPI 2021: >90 ML/MIN/1.73M2
EOSINOPHIL # BLD AUTO: 0.2 10E3/UL (ref 0–0.7)
EOSINOPHIL NFR BLD AUTO: 2 %
ERYTHROCYTE [DISTWIDTH] IN BLOOD BY AUTOMATED COUNT: 12.6 % (ref 10–15)
GLUCOSE SERPL-MCNC: 122 MG/DL (ref 70–99)
HCO3 SERPL-SCNC: 21 MMOL/L (ref 22–29)
HCT VFR BLD AUTO: 41.4 % (ref 35–47)
HGB BLD-MCNC: 14.4 G/DL (ref 11.7–15.7)
IMM GRANULOCYTES # BLD: 0 10E3/UL
IMM GRANULOCYTES NFR BLD: 1 %
LYMPHOCYTES # BLD AUTO: 2.8 10E3/UL (ref 0.8–5.3)
LYMPHOCYTES NFR BLD AUTO: 32 %
MCH RBC QN AUTO: 31.7 PG (ref 26.5–33)
MCHC RBC AUTO-ENTMCNC: 34.8 G/DL (ref 31.5–36.5)
MCV RBC AUTO: 91 FL (ref 78–100)
MONOCYTES # BLD AUTO: 0.7 10E3/UL (ref 0–1.3)
MONOCYTES NFR BLD AUTO: 8 %
NEUTROPHILS # BLD AUTO: 5 10E3/UL (ref 1.6–8.3)
NEUTROPHILS NFR BLD AUTO: 56 %
NRBC # BLD AUTO: 0 10E3/UL
NRBC BLD AUTO-RTO: 0 /100
PLATELET # BLD AUTO: 384 10E3/UL (ref 150–450)
POTASSIUM SERPL-SCNC: 3.9 MMOL/L (ref 3.4–5.3)
PROT SERPL-MCNC: 7.8 G/DL (ref 6.4–8.3)
RBC # BLD AUTO: 4.54 10E6/UL (ref 3.8–5.2)
SODIUM SERPL-SCNC: 137 MMOL/L (ref 135–145)
WBC # BLD AUTO: 8.6 10E3/UL (ref 4–11)

## 2024-07-31 PROCEDURE — 82542 COL CHROMOTOGRAPHY QUAL/QUAN: CPT | Performed by: DERMATOLOGY

## 2024-07-31 PROCEDURE — 82040 ASSAY OF SERUM ALBUMIN: CPT | Performed by: DERMATOLOGY

## 2024-07-31 PROCEDURE — 85025 COMPLETE CBC W/AUTO DIFF WBC: CPT | Performed by: DERMATOLOGY

## 2024-07-31 NOTE — PROGRESS NOTES
Skilled Nurse visit in the \[facility]\"" Ambulatory Infusion Site to administer Inflectra 600 mg in 250 mL of sodium chloride 0.9% IV over approximately 1 hour every 4 weeks. Rate: 250 ml/hr..  No recent elevated temperature, fever, chills, productive cough, coughing for 3 weeks or longer or hemoptysis, abnormal vital signs, night sweats, chest pain. No  decrease in your appetite, unexplained weight loss or fatigue.  No other new onset medical symptoms.  Current weight 281 lbs.  Peripheral IVleft Hand, 1attempt. Labs drawn CBC w/ differential, CMP, infliximab level. Infusion completed without complication or reaction. Pt reports therapy iseffective in managing symptoms related to therapy. Lory Gerardo RN BSN

## 2024-08-02 LAB
INFLIXIMAB AB SERPL IA-MCNC: <3.1 U/ML
INFLIXIMAB SERPL-MCNC: >34 UG/ML

## 2024-08-21 DIAGNOSIS — L73.2 HIDRADENITIS SUPPURATIVA: Primary | ICD-10-CM

## 2024-08-29 ENCOUNTER — LAB REQUISITION (OUTPATIENT)
Dept: LAB | Facility: CLINIC | Age: 34
End: 2024-08-29
Payer: COMMERCIAL

## 2024-08-29 DIAGNOSIS — L73.2 HIDRADENITIS SUPPURATIVA: ICD-10-CM

## 2024-08-29 LAB
ALBUMIN SERPL BCG-MCNC: 4.2 G/DL (ref 3.5–5.2)
ALP SERPL-CCNC: 78 U/L (ref 40–150)
ALT SERPL W P-5'-P-CCNC: 67 U/L (ref 0–50)
ALT SERPL W P-5'-P-CCNC: 67 U/L (ref 0–50)
ANION GAP SERPL CALCULATED.3IONS-SCNC: 12 MMOL/L (ref 7–15)
AST SERPL W P-5'-P-CCNC: 41 U/L (ref 0–45)
AST SERPL W P-5'-P-CCNC: 41 U/L (ref 0–45)
BASOPHILS # BLD AUTO: 0 10E3/UL (ref 0–0.2)
BASOPHILS NFR BLD AUTO: 0 %
BILIRUB SERPL-MCNC: 0.3 MG/DL
BUN SERPL-MCNC: 8.7 MG/DL (ref 6–20)
CALCIUM SERPL-MCNC: 9.2 MG/DL (ref 8.8–10.4)
CHLORIDE SERPL-SCNC: 106 MMOL/L (ref 98–107)
CREAT SERPL-MCNC: 0.74 MG/DL (ref 0.51–0.95)
EGFRCR SERPLBLD CKD-EPI 2021: >90 ML/MIN/1.73M2
EOSINOPHIL # BLD AUTO: 0.2 10E3/UL (ref 0–0.7)
EOSINOPHIL NFR BLD AUTO: 2 %
ERYTHROCYTE [DISTWIDTH] IN BLOOD BY AUTOMATED COUNT: 12.8 % (ref 10–15)
GLUCOSE SERPL-MCNC: 110 MG/DL (ref 70–99)
HCO3 SERPL-SCNC: 21 MMOL/L (ref 22–29)
HCT VFR BLD AUTO: 41.9 % (ref 35–47)
HGB BLD-MCNC: 14.4 G/DL (ref 11.7–15.7)
IMM GRANULOCYTES # BLD: 0.1 10E3/UL
IMM GRANULOCYTES NFR BLD: 1 %
LYMPHOCYTES # BLD AUTO: 2.6 10E3/UL (ref 0.8–5.3)
LYMPHOCYTES NFR BLD AUTO: 31 %
MCH RBC QN AUTO: 31.6 PG (ref 26.5–33)
MCHC RBC AUTO-ENTMCNC: 34.4 G/DL (ref 31.5–36.5)
MCV RBC AUTO: 92 FL (ref 78–100)
MONOCYTES # BLD AUTO: 0.7 10E3/UL (ref 0–1.3)
MONOCYTES NFR BLD AUTO: 9 %
NEUTROPHILS # BLD AUTO: 4.7 10E3/UL (ref 1.6–8.3)
NEUTROPHILS NFR BLD AUTO: 57 %
NRBC # BLD AUTO: 0 10E3/UL
NRBC BLD AUTO-RTO: 0 /100
PLATELET # BLD AUTO: 387 10E3/UL (ref 150–450)
POTASSIUM SERPL-SCNC: 3.8 MMOL/L (ref 3.4–5.3)
PROT SERPL-MCNC: 7.5 G/DL (ref 6.4–8.3)
RBC # BLD AUTO: 4.56 10E6/UL (ref 3.8–5.2)
SODIUM SERPL-SCNC: 139 MMOL/L (ref 135–145)
WBC # BLD AUTO: 8.2 10E3/UL (ref 4–11)

## 2024-08-29 PROCEDURE — 85025 COMPLETE CBC W/AUTO DIFF WBC: CPT | Performed by: DERMATOLOGY

## 2024-08-29 PROCEDURE — 82542 COL CHROMOTOGRAPHY QUAL/QUAN: CPT | Performed by: DERMATOLOGY

## 2024-08-29 PROCEDURE — 80053 COMPREHEN METABOLIC PANEL: CPT | Performed by: DERMATOLOGY

## 2024-09-05 ENCOUNTER — ENROLLMENT (OUTPATIENT)
Dept: HOME HEALTH SERVICES | Facility: HOME HEALTH | Age: 34
End: 2024-09-05
Payer: COMMERCIAL

## 2024-09-05 LAB
INFLIXIMAB AB SERPL IA-MCNC: <3.1 U/ML
INFLIXIMAB SERPL-MCNC: >34 UG/ML

## 2024-10-10 ENCOUNTER — LAB REQUISITION (OUTPATIENT)
Dept: LAB | Facility: CLINIC | Age: 34
End: 2024-10-10
Payer: COMMERCIAL

## 2024-10-10 DIAGNOSIS — L73.2 HIDRADENITIS SUPPURATIVA: ICD-10-CM

## 2024-10-10 LAB
ALBUMIN SERPL BCG-MCNC: 4.2 G/DL (ref 3.5–5.2)
ALP SERPL-CCNC: 87 U/L (ref 40–150)
ALT SERPL W P-5'-P-CCNC: 45 U/L (ref 0–50)
ANION GAP SERPL CALCULATED.3IONS-SCNC: 12 MMOL/L (ref 7–15)
AST SERPL W P-5'-P-CCNC: 31 U/L (ref 0–45)
BASOPHILS # BLD AUTO: 0 10E3/UL (ref 0–0.2)
BASOPHILS NFR BLD AUTO: 0 %
BILIRUB SERPL-MCNC: 0.3 MG/DL
BUN SERPL-MCNC: 11.3 MG/DL (ref 6–20)
CALCIUM SERPL-MCNC: 9.5 MG/DL (ref 8.8–10.4)
CHLORIDE SERPL-SCNC: 104 MMOL/L (ref 98–107)
CREAT SERPL-MCNC: 0.81 MG/DL (ref 0.51–0.95)
EGFRCR SERPLBLD CKD-EPI 2021: >90 ML/MIN/1.73M2
EOSINOPHIL # BLD AUTO: 0.2 10E3/UL (ref 0–0.7)
EOSINOPHIL NFR BLD AUTO: 2 %
ERYTHROCYTE [DISTWIDTH] IN BLOOD BY AUTOMATED COUNT: 12.9 % (ref 10–15)
GLUCOSE SERPL-MCNC: 109 MG/DL (ref 70–99)
HCO3 SERPL-SCNC: 22 MMOL/L (ref 22–29)
HCT VFR BLD AUTO: 41.8 % (ref 35–47)
HGB BLD-MCNC: 14.5 G/DL (ref 11.7–15.7)
IMM GRANULOCYTES # BLD: 0 10E3/UL
IMM GRANULOCYTES NFR BLD: 0 %
LYMPHOCYTES # BLD AUTO: 2.7 10E3/UL (ref 0.8–5.3)
LYMPHOCYTES NFR BLD AUTO: 26 %
MCH RBC QN AUTO: 31.4 PG (ref 26.5–33)
MCHC RBC AUTO-ENTMCNC: 34.7 G/DL (ref 31.5–36.5)
MCV RBC AUTO: 91 FL (ref 78–100)
MONOCYTES # BLD AUTO: 0.8 10E3/UL (ref 0–1.3)
MONOCYTES NFR BLD AUTO: 8 %
NEUTROPHILS # BLD AUTO: 6.7 10E3/UL (ref 1.6–8.3)
NEUTROPHILS NFR BLD AUTO: 64 %
NRBC # BLD AUTO: 0 10E3/UL
NRBC BLD AUTO-RTO: 0 /100
PLATELET # BLD AUTO: 338 10E3/UL (ref 150–450)
POTASSIUM SERPL-SCNC: 4.2 MMOL/L (ref 3.4–5.3)
PROT SERPL-MCNC: 7.6 G/DL (ref 6.4–8.3)
RBC # BLD AUTO: 4.62 10E6/UL (ref 3.8–5.2)
SODIUM SERPL-SCNC: 138 MMOL/L (ref 135–145)
WBC # BLD AUTO: 10.4 10E3/UL (ref 4–11)

## 2024-10-10 PROCEDURE — 80053 COMPREHEN METABOLIC PANEL: CPT | Performed by: DERMATOLOGY

## 2024-10-10 PROCEDURE — 85014 HEMATOCRIT: CPT | Performed by: DERMATOLOGY

## 2024-10-10 PROCEDURE — 85004 AUTOMATED DIFF WBC COUNT: CPT | Performed by: DERMATOLOGY

## 2024-10-17 ENCOUNTER — HOME INFUSION (OUTPATIENT)
Dept: HOME HEALTH SERVICES | Facility: HOME HEALTH | Age: 34
End: 2024-10-17
Payer: COMMERCIAL

## 2024-10-17 VITALS — BODY MASS INDEX: 46.26 KG/M2 | WEIGHT: 271 LBS | HEIGHT: 64 IN

## 2024-10-17 DIAGNOSIS — L73.2 HIDRADENITIS SUPPURATIVA: Primary | ICD-10-CM

## 2024-11-03 ENCOUNTER — HOME INFUSION (OUTPATIENT)
Dept: HOME HEALTH SERVICES | Facility: HOME HEALTH | Age: 34
End: 2024-11-03
Payer: COMMERCIAL

## 2024-11-19 ENCOUNTER — HOME INFUSION (OUTPATIENT)
Dept: HOME HEALTH SERVICES | Facility: HOME HEALTH | Age: 34
End: 2024-11-19
Payer: COMMERCIAL

## 2024-11-19 ENCOUNTER — HOME INFUSION BILLING (OUTPATIENT)
Dept: HOME HEALTH SERVICES | Facility: HOME HEALTH | Age: 34
End: 2024-11-19
Payer: COMMERCIAL

## 2024-11-20 ENCOUNTER — HOME INFUSION BILLING (OUTPATIENT)
Dept: HOME HEALTH SERVICES | Facility: HOME HEALTH | Age: 34
End: 2024-11-20
Payer: COMMERCIAL

## 2024-11-20 ENCOUNTER — HOME CARE VISIT (OUTPATIENT)
Dept: HOME HEALTH SERVICES | Facility: HOME HEALTH | Age: 34
End: 2024-11-20
Payer: COMMERCIAL

## 2024-11-20 ENCOUNTER — LAB REQUISITION (OUTPATIENT)
Dept: LAB | Facility: CLINIC | Age: 34
End: 2024-11-20
Payer: COMMERCIAL

## 2024-11-20 VITALS
TEMPERATURE: 98.7 F | WEIGHT: 260 LBS | DIASTOLIC BLOOD PRESSURE: 81 MMHG | RESPIRATION RATE: 16 BRPM | OXYGEN SATURATION: 99 % | SYSTOLIC BLOOD PRESSURE: 148 MMHG | HEART RATE: 63 BPM | BODY MASS INDEX: 44.39 KG/M2

## 2024-11-20 DIAGNOSIS — L73.2 HIDRADENITIS SUPPURATIVA: ICD-10-CM

## 2024-11-20 LAB
ALBUMIN SERPL BCG-MCNC: 4.2 G/DL (ref 3.5–5.2)
ALP SERPL-CCNC: 88 U/L (ref 40–150)
ALT SERPL W P-5'-P-CCNC: 38 U/L (ref 0–50)
ANION GAP SERPL CALCULATED.3IONS-SCNC: 15 MMOL/L (ref 7–15)
AST SERPL W P-5'-P-CCNC: 30 U/L (ref 0–45)
BASOPHILS # BLD AUTO: 0 10E3/UL (ref 0–0.2)
BASOPHILS NFR BLD AUTO: 0 %
BILIRUB SERPL-MCNC: 0.6 MG/DL
BUN SERPL-MCNC: 7.9 MG/DL (ref 6–20)
CALCIUM SERPL-MCNC: 9.3 MG/DL (ref 8.8–10.4)
CHLORIDE SERPL-SCNC: 101 MMOL/L (ref 98–107)
CREAT SERPL-MCNC: 0.72 MG/DL (ref 0.51–0.95)
EGFRCR SERPLBLD CKD-EPI 2021: >90 ML/MIN/1.73M2
EOSINOPHIL # BLD AUTO: 0.1 10E3/UL (ref 0–0.7)
EOSINOPHIL NFR BLD AUTO: 1 %
ERYTHROCYTE [DISTWIDTH] IN BLOOD BY AUTOMATED COUNT: 12.8 % (ref 10–15)
GLUCOSE SERPL-MCNC: 100 MG/DL (ref 70–99)
HCO3 SERPL-SCNC: 21 MMOL/L (ref 22–29)
HCT VFR BLD AUTO: 44.4 % (ref 35–47)
HGB BLD-MCNC: 15.4 G/DL (ref 11.7–15.7)
IMM GRANULOCYTES # BLD: 0 10E3/UL
IMM GRANULOCYTES NFR BLD: 0 %
LYMPHOCYTES # BLD AUTO: 2.4 10E3/UL (ref 0.8–5.3)
LYMPHOCYTES NFR BLD AUTO: 26 %
MCH RBC QN AUTO: 31.9 PG (ref 26.5–33)
MCHC RBC AUTO-ENTMCNC: 34.7 G/DL (ref 31.5–36.5)
MCV RBC AUTO: 92 FL (ref 78–100)
MONOCYTES # BLD AUTO: 0.7 10E3/UL (ref 0–1.3)
MONOCYTES NFR BLD AUTO: 8 %
NEUTROPHILS # BLD AUTO: 5.9 10E3/UL (ref 1.6–8.3)
NEUTROPHILS NFR BLD AUTO: 64 %
NRBC # BLD AUTO: 0 10E3/UL
NRBC BLD AUTO-RTO: 0 /100
PLATELET # BLD AUTO: 379 10E3/UL (ref 150–450)
POTASSIUM SERPL-SCNC: 4.3 MMOL/L (ref 3.4–5.3)
PROT SERPL-MCNC: 7.6 G/DL (ref 6.4–8.3)
RBC # BLD AUTO: 4.83 10E6/UL (ref 3.8–5.2)
SODIUM SERPL-SCNC: 137 MMOL/L (ref 135–145)
WBC # BLD AUTO: 9.2 10E3/UL (ref 4–11)

## 2024-11-20 PROCEDURE — S9338 HIT IMMUNOTHERAPY DIEM: HCPCS

## 2024-11-20 PROCEDURE — 82040 ASSAY OF SERUM ALBUMIN: CPT | Performed by: DERMATOLOGY

## 2024-11-20 PROCEDURE — 80051 ELECTROLYTE PANEL: CPT | Performed by: DERMATOLOGY

## 2024-11-20 PROCEDURE — 85004 AUTOMATED DIFF WBC COUNT: CPT | Performed by: DERMATOLOGY

## 2024-11-20 PROCEDURE — 80053 COMPREHEN METABOLIC PANEL: CPT | Performed by: DERMATOLOGY

## 2024-11-20 PROCEDURE — 85049 AUTOMATED PLATELET COUNT: CPT | Performed by: DERMATOLOGY

## 2024-11-20 NOTE — PROGRESS NOTES
"Infusion Nursing Note:  Skilled nurse visit today for Inflectra 600mg for Dahlia Hayes.   present during visit today:not needed     Pre-infusion Checklist    Have you had any delayed reaction since last infusion?   No    Have you recently had an elevated temperature, fever, chills productive cough, coughing for 3 weeks or longer or hemoptysis, abnormal vital signs, night sweats, chest pain, or have you noticed a decrease in your appetite, or noted unexplained weight loss or fatigue?   No    Do you have any open wounds or new incisions?  No    Do you have any recent or upcoming hospitalizations, surgeries, or dental procedures?   No    Do you currently have or recently have had any signs of illness or infection or are you on any antibiotics?   No    Have you had any new, sudden , or worsening abdominal pain?   No    Have you or anyone in your household received a live vaccination in the past 4 weeks?   No    Have you recently been diagnosed with any new nervous system diseases or cancer diagnosis? (i.e., Multiple Sclerosis, Guillain [place], sezures, neurological changes) Are you receiving any form of radiation or chemotherapy?   No    Are you pregnant or breastfeeding, or do you have plans of pregnancy in the future?   No    Have you been having any signs of worsening depression or suicidal ideation?  No    Have there been any other new onset medical symptoms?  No    Did the patient answer \"YES\" to any of the questions above?  No    Will the patient receive a medication that has an order for infusion reaction management?  Yes, and all drugs and supplies are available and none have .    If ordered, has the patient taken pre-medications?  No, declines    Plan:   Therapy is appropriate, will proceed with treatment.     Chemotherapy Treatment Conditions:   Not Applicable    Intravenous Access:  Peripheral IV placed.    Post Infusion Assessment:  Patient tolerated infusion without incident. "     Note: Pt states that she is happy with effectiveness of Inflectra. Before starting Inflectra she had more new lesions, more drainage, more pain.  She reports that her worst area is between her breasts.    NS 10ml flush with PIV start and 20ml NS flush to empty Inflectra bag to flush tubing     Next Visit Plan:   January 1st at 10am--pt aware this is a holiday       Monique Vasques RN 11/20/2024

## 2024-11-20 NOTE — PROGRESS NOTES
Lab-Only Nursing Note    Labs obtained via VPT    [id number]    Facility [facility] to: [facility]    Next Visit Plan:   January 1st 10am    Monique Vasques RN 11/20/2024

## 2024-11-23 ENCOUNTER — HEALTH MAINTENANCE LETTER (OUTPATIENT)
Age: 34
End: 2024-11-23

## 2024-11-25 ENCOUNTER — TELEPHONE (OUTPATIENT)
Dept: DERMATOLOGY | Facility: CLINIC | Age: 34
End: 2024-11-25

## 2024-12-23 ENCOUNTER — TELEPHONE (OUTPATIENT)
Dept: DERMATOLOGY | Facility: CLINIC | Age: 34
End: 2024-12-23
Payer: COMMERCIAL

## 2024-12-23 DIAGNOSIS — L73.2 HIDRADENITIS SUPPURATIVA: Primary | ICD-10-CM

## 2024-12-23 NOTE — TELEPHONE ENCOUNTER
[facility] Call Center    Phone Message    May a detailed message be left on voicemail: yes     Reason for Call: Other: Please review care plan and sign., Dates 10/05/24     Action Taken: TE SENT    Travel Screening: Not Applicable     Date of Service:                   Thank you!  [facility]

## 2024-12-31 ENCOUNTER — HOME INFUSION BILLING (OUTPATIENT)
Dept: HOME HEALTH SERVICES | Facility: HOME HEALTH | Age: 34
End: 2024-12-31
Payer: COMMERCIAL

## 2024-12-31 ENCOUNTER — HOME INFUSION (OUTPATIENT)
Dept: HOME HEALTH SERVICES | Facility: HOME HEALTH | Age: 34
End: 2024-12-31
Payer: COMMERCIAL

## 2025-01-01 ENCOUNTER — HOME INFUSION BILLING (OUTPATIENT)
Dept: HOME HEALTH SERVICES | Facility: HOME HEALTH | Age: 35
End: 2025-01-01
Payer: COMMERCIAL

## 2025-01-01 ENCOUNTER — HOME CARE VISIT (OUTPATIENT)
Dept: HOME HEALTH SERVICES | Facility: HOME HEALTH | Age: 35
End: 2025-01-01
Payer: COMMERCIAL

## 2025-01-01 ENCOUNTER — LAB REQUISITION (OUTPATIENT)
Dept: LAB | Facility: CLINIC | Age: 35
End: 2025-01-01
Payer: COMMERCIAL

## 2025-01-01 VITALS
HEART RATE: 64 BPM | SYSTOLIC BLOOD PRESSURE: 116 MMHG | OXYGEN SATURATION: 99 % | WEIGHT: 259.5 LBS | RESPIRATION RATE: 16 BRPM | DIASTOLIC BLOOD PRESSURE: 56 MMHG | BODY MASS INDEX: 44.3 KG/M2 | TEMPERATURE: 97.5 F

## 2025-01-01 DIAGNOSIS — L73.2 HIDRADENITIS SUPPURATIVA: ICD-10-CM

## 2025-01-01 LAB
ALBUMIN SERPL BCG-MCNC: 4 G/DL (ref 3.5–5.2)
ALP SERPL-CCNC: 92 U/L (ref 40–150)
ALT SERPL W P-5'-P-CCNC: 24 U/L (ref 0–50)
ANION GAP SERPL CALCULATED.3IONS-SCNC: 13 MMOL/L (ref 7–15)
AST SERPL W P-5'-P-CCNC: 18 U/L (ref 0–45)
BASOPHILS # BLD AUTO: 0 10E3/UL (ref 0–0.2)
BASOPHILS NFR BLD AUTO: 0 %
BILIRUB SERPL-MCNC: 0.2 MG/DL
BUN SERPL-MCNC: 9.7 MG/DL (ref 6–20)
CALCIUM SERPL-MCNC: 9.1 MG/DL (ref 8.8–10.4)
CHLORIDE SERPL-SCNC: 101 MMOL/L (ref 98–107)
CREAT SERPL-MCNC: 0.8 MG/DL (ref 0.51–0.95)
EGFRCR SERPLBLD CKD-EPI 2021: >90 ML/MIN/1.73M2
EOSINOPHIL # BLD AUTO: 0.2 10E3/UL (ref 0–0.7)
EOSINOPHIL NFR BLD AUTO: 1 %
ERYTHROCYTE [DISTWIDTH] IN BLOOD BY AUTOMATED COUNT: 12.7 % (ref 10–15)
GLUCOSE SERPL-MCNC: 101 MG/DL (ref 70–99)
HCO3 SERPL-SCNC: 23 MMOL/L (ref 22–29)
HCT VFR BLD AUTO: 40.6 % (ref 35–47)
HGB BLD-MCNC: 14.1 G/DL (ref 11.7–15.7)
IMM GRANULOCYTES # BLD: 0.1 10E3/UL
IMM GRANULOCYTES NFR BLD: 1 %
LYMPHOCYTES # BLD AUTO: 3 10E3/UL (ref 0.8–5.3)
LYMPHOCYTES NFR BLD AUTO: 25 %
MCH RBC QN AUTO: 31.5 PG (ref 26.5–33)
MCHC RBC AUTO-ENTMCNC: 34.7 G/DL (ref 31.5–36.5)
MCV RBC AUTO: 91 FL (ref 78–100)
MONOCYTES # BLD AUTO: 0.9 10E3/UL (ref 0–1.3)
MONOCYTES NFR BLD AUTO: 7 %
NEUTROPHILS # BLD AUTO: 8.1 10E3/UL (ref 1.6–8.3)
NEUTROPHILS NFR BLD AUTO: 66 %
NRBC # BLD AUTO: 0 10E3/UL
NRBC BLD AUTO-RTO: 0 /100
PLATELET # BLD AUTO: 385 10E3/UL (ref 150–450)
POTASSIUM SERPL-SCNC: 4.1 MMOL/L (ref 3.4–5.3)
PROT SERPL-MCNC: 7.2 G/DL (ref 6.4–8.3)
RBC # BLD AUTO: 4.48 10E6/UL (ref 3.8–5.2)
SODIUM SERPL-SCNC: 137 MMOL/L (ref 135–145)
WBC # BLD AUTO: 12.2 10E3/UL (ref 4–11)

## 2025-01-01 PROCEDURE — 82310 ASSAY OF CALCIUM: CPT | Performed by: DERMATOLOGY

## 2025-01-01 PROCEDURE — S9338 HIT IMMUNOTHERAPY DIEM: HCPCS

## 2025-01-01 PROCEDURE — 99601 HOME NFS VISIT <2 HRS: CPT | Mod: SS

## 2025-01-01 PROCEDURE — 84155 ASSAY OF PROTEIN SERUM: CPT | Performed by: DERMATOLOGY

## 2025-01-01 PROCEDURE — 99602 HOME NFS VISIT EACH ADDL HR: CPT | Mod: SS

## 2025-01-01 PROCEDURE — 85004 AUTOMATED DIFF WBC COUNT: CPT | Performed by: DERMATOLOGY

## 2025-01-01 PROCEDURE — 85014 HEMATOCRIT: CPT | Performed by: DERMATOLOGY

## 2025-01-01 NOTE — PROGRESS NOTES
"Nursing Visit Note:  Nurse visit today for piv start, labs, and Inflectra infusion  for Dahlia Hayes.     present during visit today: Not Applicable.    Infusion Nursing Note:  Skilled nurse visit today for Inflectra infusion  for Dahlia Hayes.   present during visit today: Not Applicable.    Pre-infusion Checklist:   Pre-infusion Checklist    Have you had any delayed reaction since last infusion?   No    Have you recently had an elevated temperature, fever, chills productive cough, coughing for 3 weeks or longer or hemoptysis, abnormal vital signs, night sweats, chest pain, or have you noticed a decrease in your appetite, or noted unexplained weight loss or fatigue?   No    Do you have any open wounds or new incisions?  No    Do you have any recent or upcoming hospitalizations, surgeries, or dental procedures?   No    Do you currently have or recently have had any signs of illness or infection or are you on any antibiotics?   No    Have you had any new, sudden , or worsening abdominal pain?   No    Have you or anyone in your household received a live vaccination in the past 4 weeks?   No    Have you recently been diagnosed with any new nervous system diseases or cancer diagnosis? (i.e., Multiple Sclerosis, Guillain [place], sezures, neurological changes) Are you receiving any form of radiation or chemotherapy?   No    Are you pregnant or breastfeeding, or do you have plans of pregnancy in the future?   No    Have you been having any signs of worsening depression or suicidal ideation?  No    Have there been any other new onset medical symptoms?  No    Did the patient answer \"YES\" to any of the questions above?  No    Will the patient receive a medication that has an order for infusion reaction management?  Yes, and all drugs and supplies are available and none have .    If ordered, has the patient taken pre-medications?  N/A    Plan:   Therapy is appropriate, will proceed with " treatment.     Chemotherapy Treatment Conditions:   Not Applicable    Intravenous Access:  Labs drawn without difficulty. and Peripheral IV placed.    Post Infusion Assessment:  Patient tolerated infusion without incident.  Site patent and intact, free from redness, edema or discomfort.  Access discontinued per protocol.     Note: Pt reports that she is doing well. She reports that the number of lesions has decreased and they are not really painful when they flare up and they flare up less often. She says they flare up a little bit maybe every 2-3 weeks but they don't get really painful and they don't drain they just go back down after a few days. Current lesion spots include underneath her breasts and 1 in between her breasts, on her lower abdomen and her upper thighs along the panty line. She says the ones that flare the most are the ones under her breasts and the one in between is the only one that will drain sometimes. She says this is most likely because of the friction with her bras. She feels the medication is effective though as her symptoms have improved significantly. She denies any side effects to the medication as well and says she has no other issues or concerns at this time.     Saline administered (in mLs): 40 mLs total       Next Visit Plan:   [facility] in [place] for labs and Inflectra infusion in 6 weeks. Next visit scheduled for 02/12/25 @1000. Visit confirmed with patient and added to Epic schedule.         Kell Vang RN 1/1/2025  and Lab-Only Nursing Note    Labs [facility] via VPT    [facility] no [facility] on [facility]     Facility sent to: [facility]    Saline administered (in mLs): 10 mLs      Next Visit Plan:   labs drawn: CBCd/p and CMP      Kell Vang RN 1/1/2025

## 2025-02-11 ENCOUNTER — HOME INFUSION BILLING (OUTPATIENT)
Dept: HOME HEALTH SERVICES | Facility: HOME HEALTH | Age: 35
End: 2025-02-11
Payer: COMMERCIAL

## 2025-02-11 ENCOUNTER — HOME INFUSION (OUTPATIENT)
Dept: HOME HEALTH SERVICES | Facility: HOME HEALTH | Age: 35
End: 2025-02-11
Payer: COMMERCIAL

## 2025-02-11 VITALS — WEIGHT: 245 LBS | BODY MASS INDEX: 41.83 KG/M2

## 2025-02-12 ENCOUNTER — HOME CARE VISIT (OUTPATIENT)
Dept: HOME HEALTH SERVICES | Facility: HOME HEALTH | Age: 35
End: 2025-02-12
Payer: COMMERCIAL

## 2025-02-12 ENCOUNTER — HOME INFUSION BILLING (OUTPATIENT)
Dept: HOME HEALTH SERVICES | Facility: HOME HEALTH | Age: 35
End: 2025-02-12
Payer: COMMERCIAL

## 2025-02-12 ENCOUNTER — LAB REQUISITION (OUTPATIENT)
Dept: LAB | Facility: CLINIC | Age: 35
End: 2025-02-12
Payer: COMMERCIAL

## 2025-02-12 VITALS
SYSTOLIC BLOOD PRESSURE: 108 MMHG | WEIGHT: 246.5 LBS | DIASTOLIC BLOOD PRESSURE: 63 MMHG | TEMPERATURE: 97.7 F | RESPIRATION RATE: 16 BRPM | BODY MASS INDEX: 42.08 KG/M2 | HEART RATE: 68 BPM | OXYGEN SATURATION: 99 %

## 2025-02-12 DIAGNOSIS — L73.2 HIDRADENITIS SUPPURATIVA: ICD-10-CM

## 2025-02-12 LAB
ALBUMIN SERPL BCG-MCNC: 4.2 G/DL (ref 3.5–5.2)
ALP SERPL-CCNC: 90 U/L (ref 40–150)
ALT SERPL W P-5'-P-CCNC: 32 U/L (ref 0–50)
ANION GAP SERPL CALCULATED.3IONS-SCNC: 13 MMOL/L (ref 7–15)
AST SERPL W P-5'-P-CCNC: 25 U/L (ref 0–45)
BASOPHILS # BLD AUTO: 0 10E3/UL (ref 0–0.2)
BASOPHILS NFR BLD AUTO: 0 %
BILIRUB SERPL-MCNC: 0.4 MG/DL
BUN SERPL-MCNC: 10.1 MG/DL (ref 6–20)
CALCIUM SERPL-MCNC: 9.4 MG/DL (ref 8.8–10.4)
CHLORIDE SERPL-SCNC: 102 MMOL/L (ref 98–107)
CREAT SERPL-MCNC: 0.77 MG/DL (ref 0.51–0.95)
EGFRCR SERPLBLD CKD-EPI 2021: >90 ML/MIN/1.73M2
EOSINOPHIL # BLD AUTO: 0.2 10E3/UL (ref 0–0.7)
EOSINOPHIL NFR BLD AUTO: 2 %
ERYTHROCYTE [DISTWIDTH] IN BLOOD BY AUTOMATED COUNT: 12.7 % (ref 10–15)
GLUCOSE SERPL-MCNC: 108 MG/DL (ref 70–99)
HCO3 SERPL-SCNC: 22 MMOL/L (ref 22–29)
HCT VFR BLD AUTO: 40.9 % (ref 35–47)
HGB BLD-MCNC: 14 G/DL (ref 11.7–15.7)
IMM GRANULOCYTES # BLD: 0 10E3/UL
IMM GRANULOCYTES NFR BLD: 1 %
LYMPHOCYTES # BLD AUTO: 2.3 10E3/UL (ref 0.8–5.3)
LYMPHOCYTES NFR BLD AUTO: 29 %
MCH RBC QN AUTO: 31.2 PG (ref 26.5–33)
MCHC RBC AUTO-ENTMCNC: 34.2 G/DL (ref 31.5–36.5)
MCV RBC AUTO: 91 FL (ref 78–100)
MONOCYTES # BLD AUTO: 0.7 10E3/UL (ref 0–1.3)
MONOCYTES NFR BLD AUTO: 9 %
NEUTROPHILS # BLD AUTO: 4.7 10E3/UL (ref 1.6–8.3)
NEUTROPHILS NFR BLD AUTO: 60 %
NRBC # BLD AUTO: 0 10E3/UL
NRBC BLD AUTO-RTO: 0 /100
PLATELET # BLD AUTO: 370 10E3/UL (ref 150–450)
POTASSIUM SERPL-SCNC: 4.2 MMOL/L (ref 3.4–5.3)
PROT SERPL-MCNC: 7.7 G/DL (ref 6.4–8.3)
RBC # BLD AUTO: 4.49 10E6/UL (ref 3.8–5.2)
SODIUM SERPL-SCNC: 137 MMOL/L (ref 135–145)
WBC # BLD AUTO: 7.9 10E3/UL (ref 4–11)

## 2025-02-12 PROCEDURE — 99601 HOME NFS VISIT <2 HRS: CPT | Mod: SS

## 2025-02-12 PROCEDURE — 99602 HOME NFS VISIT EACH ADDL HR: CPT | Mod: SS

## 2025-02-12 PROCEDURE — 80053 COMPREHEN METABOLIC PANEL: CPT | Performed by: DERMATOLOGY

## 2025-02-12 PROCEDURE — 85025 COMPLETE CBC W/AUTO DIFF WBC: CPT | Performed by: DERMATOLOGY

## 2025-02-12 PROCEDURE — S9338 HIT IMMUNOTHERAPY DIEM: HCPCS

## 2025-02-12 NOTE — PROGRESS NOTES
Nursing Visit Note:  Nurse visit today for piv start and Inflectra infusion  for Dahlia Hayes.     present during visit today: Not Applicable.    Intravenous Access:  Labs drawn without difficulty. and Peripheral IV placed.     Infusion Nursing Note:    Pre-infusion Checklist:   Have you had any delayed reaction since last infusion?   No     Have you recently had an elevated temperature, fever, chills productive cough, coughing for 3 weeks or longer or hemoptysis, abnormal vital signs, night sweats, chest pain, or have you noticed a decrease in your appetite, or noted unexplained weight loss or fatigue?   No     Do you have any open wounds or new incisions?  No     Do you have any recent or upcoming hospitalizations, surgeries, or dental procedures? Does not include esophagogastroduodenoscopy, colonoscopy, endoscopic retrograde cholangiopancreatography (ERCP), endoscopic ultrasound (EUS), dental procedures or joint aspiration/steroid injections.   No     Do you currently have or recently have had any signs of illness or infection or are you on any antibiotics?   No     Have you had any new, sudden, or worsening abdominal pain?   No     Have you or anyone in your household received a live vaccination in the past 4 weeks?   No     Have you recently been diagnosed with any new nervous system diseases or cancer diagnosis? (i.e., Multiple Sclerosis, Guillain [place], seizures, neurological changes) Are you receiving any form of radiation or chemotherapy?   No     Are you pregnant or breastfeeding, or do you have plans of pregnancy in the future?   No     Have you been having any signs of worsening depression or suicidal ideation?  No     Have you had any other new onset medical symptoms?  No    Entyvio/Ocrevus/Tyasabri only: Have you been having any new or worsening medical problems such as issues with thinking, eyesight, balance or strength that have persisted over several days?   N/A    Benlysta only:  "Have you been having any signs of worsening depression or suicidal ideations?    N/A    IVIG only: Have you had any new blood clots?  N/A    Did the patient answer \"YES\" to any of the questions above?  No     Will the patient receive a medication that has an order for infusion reaction management?  Yes, and all drugs and supplies are available and none have .     If ordered, has the patient taken pre-medications?  N/A    Plan:   Therapy is appropriate, will proceed with treatment.     Post Infusion Assessment:  Patient tolerated infusion without incident.  Site patent and intact, free from redness, edema or discomfort.  Access discontinued per protocol.    and Lab-Only Nursing Note    Labs obtained via VPT    Time Specimen drawn: 1038    Last dose (if applicable): No    Facility sent to: [facility]    [facility] Tracking number: [id number]    Note: Labs drawn: CBCD/p and CMP    Pt says she is feeling well today and overall everything has remained stable. She says that she had a mild flare up of the lesions on her lower abdomen/bikini area about a week ago. She says she actually started spotting  a little which is abnormal for her because she has an IUD in place and so she never usually menstruates anymore. She also developed a new lesion spot on her labia at that time but that one and the ones on her lower abdomen went away on there own after a couple days. She says they were a little painful and they never drained. Otherwise, the lesions under her breasts and in between flare up a little bit and will drain on and off but she feels that is related to her bra rubbing against them and irritating the skin. She feels the medication is effective as her symptoms have improved significantly. She denies any side effects to the medication and says she has no other issues or concerns at this time.     Saline administered: 40 (ml)    Supply Check:   Does the patient have all the supplies they need for the next visit?  Yes, pt seen " in [place] only    Next visit plan: SNV in [place] for Inflectra infusion in 6 weeks. Next visit scheduled for 3/26/2025 @1000. Visit confirmed with patient and added to Epic schedule.     Kell Vang, RN 2/12/2025

## 2025-03-25 ENCOUNTER — HOME INFUSION (OUTPATIENT)
Dept: HOME HEALTH SERVICES | Facility: HOME HEALTH | Age: 35
End: 2025-03-25
Payer: COMMERCIAL

## 2025-03-25 ENCOUNTER — HOME INFUSION BILLING (OUTPATIENT)
Dept: HOME HEALTH SERVICES | Facility: HOME HEALTH | Age: 35
End: 2025-03-25
Payer: COMMERCIAL

## 2025-03-26 ENCOUNTER — LAB REQUISITION (OUTPATIENT)
Dept: LAB | Facility: CLINIC | Age: 35
End: 2025-03-26
Payer: COMMERCIAL

## 2025-03-26 ENCOUNTER — HOME CARE VISIT (OUTPATIENT)
Dept: HOME HEALTH SERVICES | Facility: HOME HEALTH | Age: 35
End: 2025-03-26
Payer: COMMERCIAL

## 2025-03-26 ENCOUNTER — HOME INFUSION BILLING (OUTPATIENT)
Dept: HOME HEALTH SERVICES | Facility: HOME HEALTH | Age: 35
End: 2025-03-26
Payer: COMMERCIAL

## 2025-03-26 VITALS
HEART RATE: 67 BPM | DIASTOLIC BLOOD PRESSURE: 78 MMHG | WEIGHT: 242.5 LBS | RESPIRATION RATE: 20 BRPM | TEMPERATURE: 97.7 F | BODY MASS INDEX: 41.4 KG/M2 | OXYGEN SATURATION: 97 % | SYSTOLIC BLOOD PRESSURE: 115 MMHG

## 2025-03-26 DIAGNOSIS — L73.2 HIDRADENITIS SUPPURATIVA: ICD-10-CM

## 2025-03-26 LAB
ALBUMIN SERPL BCG-MCNC: 4.3 G/DL (ref 3.5–5.2)
ALP SERPL-CCNC: 92 U/L (ref 40–150)
ALT SERPL W P-5'-P-CCNC: 21 U/L (ref 0–50)
ANION GAP SERPL CALCULATED.3IONS-SCNC: 16 MMOL/L (ref 7–15)
AST SERPL W P-5'-P-CCNC: 20 U/L (ref 0–45)
BASOPHILS # BLD AUTO: 0 10E3/UL (ref 0–0.2)
BASOPHILS NFR BLD AUTO: 0 %
BILIRUB SERPL-MCNC: 0.4 MG/DL
BUN SERPL-MCNC: 8.2 MG/DL (ref 6–20)
CALCIUM SERPL-MCNC: 9.5 MG/DL (ref 8.8–10.4)
CHLORIDE SERPL-SCNC: 102 MMOL/L (ref 98–107)
CREAT SERPL-MCNC: 0.76 MG/DL (ref 0.51–0.95)
EGFRCR SERPLBLD CKD-EPI 2021: >90 ML/MIN/1.73M2
EOSINOPHIL # BLD AUTO: 0.1 10E3/UL (ref 0–0.7)
EOSINOPHIL NFR BLD AUTO: 1 %
ERYTHROCYTE [DISTWIDTH] IN BLOOD BY AUTOMATED COUNT: 12.6 % (ref 10–15)
GLUCOSE SERPL-MCNC: 85 MG/DL (ref 70–99)
HCO3 SERPL-SCNC: 21 MMOL/L (ref 22–29)
HCT VFR BLD AUTO: 42.3 % (ref 35–47)
HGB BLD-MCNC: 14.3 G/DL (ref 11.7–15.7)
IMM GRANULOCYTES # BLD: 0.1 10E3/UL
IMM GRANULOCYTES NFR BLD: 1 %
LYMPHOCYTES # BLD AUTO: 2.8 10E3/UL (ref 0.8–5.3)
LYMPHOCYTES NFR BLD AUTO: 27 %
MCH RBC QN AUTO: 30.6 PG (ref 26.5–33)
MCHC RBC AUTO-ENTMCNC: 33.8 G/DL (ref 31.5–36.5)
MCV RBC AUTO: 90 FL (ref 78–100)
MONOCYTES # BLD AUTO: 0.8 10E3/UL (ref 0–1.3)
MONOCYTES NFR BLD AUTO: 8 %
NEUTROPHILS # BLD AUTO: 6.3 10E3/UL (ref 1.6–8.3)
NEUTROPHILS NFR BLD AUTO: 63 %
NRBC # BLD AUTO: 0 10E3/UL
NRBC BLD AUTO-RTO: 0 /100
PLATELET # BLD AUTO: 386 10E3/UL (ref 150–450)
POTASSIUM SERPL-SCNC: 4.1 MMOL/L (ref 3.4–5.3)
PROT SERPL-MCNC: 7.8 G/DL (ref 6.4–8.3)
RBC # BLD AUTO: 4.68 10E6/UL (ref 3.8–5.2)
SODIUM SERPL-SCNC: 139 MMOL/L (ref 135–145)
WBC # BLD AUTO: 10.1 10E3/UL (ref 4–11)

## 2025-03-26 PROCEDURE — 80053 COMPREHEN METABOLIC PANEL: CPT | Performed by: DERMATOLOGY

## 2025-03-26 PROCEDURE — 85025 COMPLETE CBC W/AUTO DIFF WBC: CPT | Performed by: DERMATOLOGY

## 2025-03-26 PROCEDURE — 99601 HOME NFS VISIT <2 HRS: CPT | Mod: SS

## 2025-03-26 NOTE — PROGRESS NOTES
Nursing Visit Note:  Nurse visit today for piv start, labs, and Inflectra infusion for Dahlia Hayes.     present during visit today: Not Applicable.    Intravenous Access:  Labs drawn without difficulty. and Peripheral IV placed.    Infusion Nursing Note:    Pre-infusion Checklist:   Have you had any delayed reaction since last infusion?   No     Have you recently had an elevated temperature, fever, chills productive cough, coughing for 3 weeks or longer or hemoptysis, abnormal vital signs, night sweats, chest pain, or have you noticed a decrease in your appetite, or noted unexplained weight loss or fatigue?   No     Do you have any open wounds or new incisions?  No     Do you have any recent or upcoming hospitalizations, surgeries, or dental procedures? Does not include esophagogastroduodenoscopy, colonoscopy, endoscopic retrograde cholangiopancreatography (ERCP), endoscopic ultrasound (EUS), dental procedures or joint aspiration/steroid injections.   No     Do you currently have or recently have had any signs of illness or infection or are you on any antibiotics?   No     Have you had any new, sudden, or worsening abdominal pain?   No     Have you or anyone in your household received a live vaccination in the past 4 weeks?   No     Have you recently been diagnosed with any new nervous system diseases or cancer diagnosis? (i.e., Multiple Sclerosis, Guillain [place], seizures, neurological changes) Are you receiving any form of radiation or chemotherapy?   No     Are you pregnant or breastfeeding, or do you have plans of pregnancy in the future?   No     Have you been having any signs of worsening depression or suicidal ideation?  No     Have you had any other new onset medical symptoms?  No    Entyvio/Ocrevus/Tyasabri only: Have you been having any new or worsening medical problems such as issues with thinking, eyesight, balance or strength that have persisted over several days?   N/A    Benlysta  "only: Have you been having any signs of worsening depression or suicidal ideations?    N/A    IVIG only: Have you had any new blood clots?  N/A    Did the patient answer \"YES\" to any of the questions above?  No     Will the patient receive a medication that has an order for infusion reaction management?  Yes, and all drugs and supplies are available and none have .     If ordered, has the patient taken pre-medications?  N/A    Plan:   Therapy is appropriate, will proceed with treatment.     Post Infusion Assessment:  Patient tolerated infusion without incident.  Site patent and intact, free from redness, edema or discomfort.  Access discontinued per protocol.    and Lab-Only Nursing Note    Labs obtained via VPT    Time Specimen drawn: 1035    Last dose (if applicable): No    Facility sent to: [email or web address]    [facility] Tracking number: [id number]    Note: labs drawn: CMP, and CBCd/p    Pt says she is feeling well today and overall everything has remained stable. She reports she has 1 lesion that is on her buttocks right above the crevice that has flared up over the last 3 days and is draining on and off and is just a little painful. She rates the pain a 1/10 today which she says is tolerable. Otherwise, her other normal spots she says have remained stable and unchanged. She continues to have some drainage from the lesions under her breasts on and off but nothing else has flared up and she has no new spots. She feels the medication is effective as her symptoms have improved significantly. She denies any side effects to the medication and says she has no other issues or concerns at this time.     Saline administered: 40 (ml)    Supply Check:   Does the patient have all the supplies they need for the next visit?  Yes, pt seen in [facility] only    Next visit plan: SNV in [place] for Inflectra infusion in 6 weeks. Next visit scheduled for 2025 @1000. Visit confirmed with patient and added to Epic schedule.        Kell Vang, " RAMONE 3/26/2025

## 2025-04-23 DIAGNOSIS — L73.2 HIDRADENITIS SUPPURATIVA: ICD-10-CM

## 2025-04-25 NOTE — TELEPHONE ENCOUNTER
Last Written Prescription:  doxycycline monohydrate (MONODOX) 100 MG capsule 180 capsule 3 4/11/2024     spironolactone (ALDACTONE) 100 MG tablet 180 tablet 3 4/11/2024     ----------------------  Last Visit Date: 8/31/23  Future Visit Date: 9/4/25  ----------------------      Refill decision: Medication unable to be refilled by RN due to: Other:    Does not pass protocol-Pt outside of RTC timeframe.  Follow-up 9/4/25    Request from pharmacy:  Requested Prescriptions   Pending Prescriptions Disp Refills    doxycycline monohydrate (MONODOX) 100 MG capsule [Pharmacy Med Name: DOXYCYCLINE MONOHYDRATE 100MG CAPS] 180 capsule 3     Sig: TAKE 1 CAPSULE(100 MG) BY MOUTH TWICE DAILY       There is no refill protocol information for this order       spironolactone (ALDACTONE) 100 MG tablet [Pharmacy Med Name: SPIRONOLACTONE 100MG TABLETS] 180 tablet 3     Sig: TAKE 1 TABLET(100 MG) BY MOUTH TWICE DAILY       Diuretics (Including Combos) Protocol Failed - 4/25/2025 12:03 PM        Failed - Medication indicated for associated diagnosis     Medication is associated with one or more of the following diagnoses:     Hypertension   Heart Failure   Hyperaldosteronism   Acne   Hirsutism   Hypokalemia   Hepatic Cirrhosis   Nephrotic Syndrome   Myocardial Infarction   Transgender Female   Cardiomyopathy  Congenital Heart Disease  Diastolic Dysfunction          Failed - Recent (12 mo) or future (90 days) visit within the authorizing provider's specialty     The patient must have completed an in-person or virtual visit within the past 12 months or has a future visit scheduled within the next 90 days with the authorizing provider s specialty.  Urgent care and e-visits do not qualify as an office visit for this protocol.          Passed - Most recent blood pressure under 140/90 in past 12 months     BP Readings from Last 3 Encounters:   03/26/25 115/78   02/12/25 108/63   01/01/25 116/56       No data recorded            Passed -  Potassium level on file in past 12 months        Passed - Medication is active on med list and the sig matches. RN to manually verify dose and sig if red X/fail.     If the protocol passes (green check), you do not need to verify med dose and sig.    A prescription matches if they are the same clinical intention.    For Example: once daily and every morning are the same.    The protocol can not identify upper and lower case letters as matching and will fail.     For Example: Take 1 tablet (50 mg) by mouth daily     TAKE 1 TABLET (50 MG) BY MOUTH DAILY    For all fails (red x), verify dose and sig.    If the refill does match what is on file, the RN can still proceed to approve the refill request.       If they do not match, route to the appropriate provider.             Passed - Has GFR on file in past 12 months and most recent value is normal        Passed - Patient is age 18 or older        Passed - No active pregancy on record        Passed - No positive pregnancy test in past 12 months

## 2025-04-27 RX ORDER — SPIRONOLACTONE 100 MG/1
100 TABLET, FILM COATED ORAL 2 TIMES DAILY
Qty: 180 TABLET | Refills: 1 | Status: SHIPPED | OUTPATIENT
Start: 2025-04-27

## 2025-04-27 RX ORDER — DOXYCYCLINE 100 MG/1
100 CAPSULE ORAL 2 TIMES DAILY
Qty: 180 CAPSULE | Refills: 1 | Status: SHIPPED | OUTPATIENT
Start: 2025-04-27

## 2025-05-02 ENCOUNTER — HOME INFUSION (OUTPATIENT)
Dept: HOME HEALTH SERVICES | Facility: HOME HEALTH | Age: 35
End: 2025-05-02
Payer: COMMERCIAL

## 2025-05-06 ENCOUNTER — HOME INFUSION BILLING (OUTPATIENT)
Dept: HOME HEALTH SERVICES | Facility: HOME HEALTH | Age: 35
End: 2025-05-06
Payer: COMMERCIAL

## 2025-05-07 ENCOUNTER — HOME INFUSION BILLING (OUTPATIENT)
Dept: HOME HEALTH SERVICES | Facility: HOME HEALTH | Age: 35
End: 2025-05-07
Payer: COMMERCIAL

## 2025-05-07 ENCOUNTER — HOME CARE VISIT (OUTPATIENT)
Dept: HOME HEALTH SERVICES | Facility: HOME HEALTH | Age: 35
End: 2025-05-07
Payer: COMMERCIAL

## 2025-05-07 ENCOUNTER — LAB REQUISITION (OUTPATIENT)
Dept: LAB | Facility: CLINIC | Age: 35
End: 2025-05-07
Payer: COMMERCIAL

## 2025-05-07 VITALS
DIASTOLIC BLOOD PRESSURE: 68 MMHG | WEIGHT: 229 LBS | BODY MASS INDEX: 39.1 KG/M2 | RESPIRATION RATE: 16 BRPM | HEART RATE: 60 BPM | SYSTOLIC BLOOD PRESSURE: 122 MMHG | OXYGEN SATURATION: 99 % | TEMPERATURE: 96.9 F

## 2025-05-07 DIAGNOSIS — L73.2 HIDRADENITIS SUPPURATIVA: ICD-10-CM

## 2025-05-07 LAB
ALBUMIN SERPL BCG-MCNC: 4.1 G/DL (ref 3.5–5.2)
ALP SERPL-CCNC: 87 U/L (ref 40–150)
ALT SERPL W P-5'-P-CCNC: 26 U/L (ref 0–50)
ANION GAP SERPL CALCULATED.3IONS-SCNC: 11 MMOL/L (ref 7–15)
AST SERPL W P-5'-P-CCNC: 23 U/L (ref 0–45)
BASOPHILS # BLD AUTO: 0 10E3/UL (ref 0–0.2)
BASOPHILS NFR BLD AUTO: 0 %
BILIRUB SERPL-MCNC: 0.4 MG/DL
BUN SERPL-MCNC: 8.9 MG/DL (ref 6–20)
CALCIUM SERPL-MCNC: 9.2 MG/DL (ref 8.8–10.4)
CHLORIDE SERPL-SCNC: 104 MMOL/L (ref 98–107)
CREAT SERPL-MCNC: 0.76 MG/DL (ref 0.51–0.95)
EGFRCR SERPLBLD CKD-EPI 2021: >90 ML/MIN/1.73M2
EOSINOPHIL # BLD AUTO: 0.1 10E3/UL (ref 0–0.7)
EOSINOPHIL NFR BLD AUTO: 1 %
ERYTHROCYTE [DISTWIDTH] IN BLOOD BY AUTOMATED COUNT: 12.8 % (ref 10–15)
GLUCOSE SERPL-MCNC: 103 MG/DL (ref 70–99)
HCO3 SERPL-SCNC: 22 MMOL/L (ref 22–29)
HCT VFR BLD AUTO: 39.8 % (ref 35–47)
HGB BLD-MCNC: 13.8 G/DL (ref 11.7–15.7)
IMM GRANULOCYTES # BLD: 0 10E3/UL
IMM GRANULOCYTES NFR BLD: 0 %
LYMPHOCYTES # BLD AUTO: 3 10E3/UL (ref 0.8–5.3)
LYMPHOCYTES NFR BLD AUTO: 34 %
MCH RBC QN AUTO: 31.4 PG (ref 26.5–33)
MCHC RBC AUTO-ENTMCNC: 34.7 G/DL (ref 31.5–36.5)
MCV RBC AUTO: 91 FL (ref 78–100)
MONOCYTES # BLD AUTO: 0.7 10E3/UL (ref 0–1.3)
MONOCYTES NFR BLD AUTO: 8 %
NEUTROPHILS # BLD AUTO: 5.1 10E3/UL (ref 1.6–8.3)
NEUTROPHILS NFR BLD AUTO: 57 %
NRBC # BLD AUTO: 0 10E3/UL
NRBC BLD AUTO-RTO: 0 /100
PLATELET # BLD AUTO: 385 10E3/UL (ref 150–450)
POTASSIUM SERPL-SCNC: 4.1 MMOL/L (ref 3.4–5.3)
PROT SERPL-MCNC: 7.5 G/DL (ref 6.4–8.3)
RBC # BLD AUTO: 4.39 10E6/UL (ref 3.8–5.2)
SODIUM SERPL-SCNC: 137 MMOL/L (ref 135–145)
WBC # BLD AUTO: 9.1 10E3/UL (ref 4–11)

## 2025-05-07 PROCEDURE — 99602 HOME NFS VISIT EACH ADDL HR: CPT | Mod: SS

## 2025-05-07 PROCEDURE — 82565 ASSAY OF CREATININE: CPT | Performed by: DERMATOLOGY

## 2025-05-07 PROCEDURE — S9338 HIT IMMUNOTHERAPY DIEM: HCPCS

## 2025-05-07 PROCEDURE — 99601 HOME NFS VISIT <2 HRS: CPT | Mod: SS

## 2025-05-07 PROCEDURE — 85025 COMPLETE CBC W/AUTO DIFF WBC: CPT | Performed by: DERMATOLOGY

## 2025-05-07 NOTE — PROGRESS NOTES
Nursing Visit Note:  Nurse visit today for Inflectra infusion for Dahlia Hayes.     present during visit today: Not Applicable.    Intravenous Access:  Lab draw site left FA, Attempts 1, Labs drawn without difficulty., and Peripheral IV placed.    Infusion Nursing Note:    Pre-infusion Checklist:   Have you had any delayed reaction since last infusion?   No     Have you recently had an elevated temperature, fever, chills productive cough, coughing for 3 weeks or longer or hemoptysis, abnormal vital signs, night sweats, chest pain, or have you noticed a decrease in your appetite, or noted unexplained weight loss or fatigue?   No     Do you have any open wounds or new incisions?  No     Do you have any recent or upcoming hospitalizations, surgeries, or dental procedures? Does not include esophagogastroduodenoscopy, colonoscopy, endoscopic retrograde cholangiopancreatography (ERCP), endoscopic ultrasound (EUS), dental procedures or joint aspiration/steroid injections.   No     Do you currently have or recently have had any signs of illness or infection or are you on any antibiotics?   No     Have you had any new, sudden, or worsening abdominal pain?   No     Have you or anyone in your household received a live vaccination in the past 4 weeks?   No     Have you recently been diagnosed with any new nervous system diseases or cancer diagnosis? (i.e., Multiple Sclerosis, Guillain [place], seizures, neurological changes) Are you receiving any form of radiation or chemotherapy?   No     Are you pregnant or breastfeeding, or do you have plans of pregnancy in the future?   No     Have you been having any signs of worsening depression or suicidal ideation?  No     Have you had any other new onset medical symptoms?  No    Entyvio/Ocrevus/Tyasabri only: Have you been having any new or worsening medical problems such as issues with thinking, eyesight, balance or strength that have persisted over several days?  "  N/A    Benlysta only: Have you been having any signs of worsening depression or suicidal ideations?    N/A    IVIG only: Have you had any new blood clots?  N/A    Did the patient answer \"YES\" to any of the questions above?  No     Will the patient receive a medication that has an order for infusion reaction management?  Yes, and all drugs and supplies are available and none have .     If ordered, has the patient taken pre-medications?  N/A    Plan:   Therapy is appropriate, will proceed with treatment.     Post Infusion Assessment:  Patient tolerated infusion without incident.  Site patent and intact, free from redness, edema or discomfort.  Access discontinued per protocol.    and Lab-Only Nursing Note    Labs obtained via VPT    Time Specimen drawn: 1030    Last dose (if applicable): No    Facility sent to: [facility]    [facility] Tracking number: [id number]    Note: labs drawn: CMP, and CBCd/p     Pt says she is feeling well today. She reports no new lesions and overall everything has remained stable. She says about 3 days ago, she had a mild flare up of the lesions under her breasts and a small one on her right thigh, along her panty line. She says she would rate the pain a 2/10 which is tolerable and says nothing has been draining. Otherwise, her other normal spots she says have remained stable and unchanged. She denies any side effects from the medication and feels the medication is effective at controlling her symptoms. She has no other issues or concerns at this time.     Saline administered: 40 (ml)    Supply Check:   Does the patient have all the supplies they need for the next visit?  Yes    Next visit plan: SNV in [place] for Inflectra infusion in 6 weeks. Next visit scheduled for 2025 @1000. Visit confirmed with patient and added to Epic schedule.     Kell Vang, RN 2025  "

## 2025-05-15 ENCOUNTER — RESULTS FOLLOW-UP (OUTPATIENT)
Dept: DERMATOLOGY | Facility: CLINIC | Age: 35
End: 2025-05-15

## 2025-05-27 ENCOUNTER — TELEPHONE (OUTPATIENT)
Dept: DERMATOLOGY | Facility: CLINIC | Age: 35
End: 2025-05-27
Payer: COMMERCIAL

## 2025-05-27 NOTE — TELEPHONE ENCOUNTER
LVM asking for pt to call. Can add on this Thursday for a phone call.      Lizeth Contreras Kristi, RN  Can you please assist in scheduling?          Previous Messages       ----- Message -----  From: Gretta Alfonso  Sent: 5/22/2025  12:01 PM CDT  To: [facility]  Subject: pt needs appt asap                              Hello!  I just wanted to let you know that Dahlia's authorization for Inflectra expires 6/2/25 and it has been over a year since she has been seen by derm. Insurance requires office visit notes within the past year documenting that the patient is still showing clinical benefit of being on the medication. Can someone please assist in getting her scheduled ASAP?  Let me know if you need anything else.    Thank you!  Gretta [facility]

## 2025-05-29 ENCOUNTER — VIRTUAL VISIT (OUTPATIENT)
Dept: DERMATOLOGY | Facility: CLINIC | Age: 35
End: 2025-05-29
Payer: COMMERCIAL

## 2025-05-29 DIAGNOSIS — L73.2 HIDRADENITIS SUPPURATIVA: Primary | ICD-10-CM

## 2025-05-29 RX ORDER — RESORCINOL
POWDER (GRAM) MISCELLANEOUS
Qty: 30 G | Refills: 4 | Status: SHIPPED | OUTPATIENT
Start: 2025-05-29

## 2025-05-29 RX ORDER — DOXYCYCLINE 100 MG/1
100 CAPSULE ORAL 2 TIMES DAILY
Qty: 180 CAPSULE | Refills: 1 | Status: SHIPPED | OUTPATIENT
Start: 2025-05-29

## 2025-05-29 RX ORDER — SPIRONOLACTONE 100 MG/1
100 TABLET, FILM COATED ORAL 2 TIMES DAILY
Qty: 180 TABLET | Refills: 1 | Status: SHIPPED | OUTPATIENT
Start: 2025-05-29

## 2025-05-29 ASSESSMENT — PAIN SCALES - GENERAL: PAINLEVEL_OUTOF10: NO PAIN (0)

## 2025-05-29 NOTE — LETTER
"5/29/2025       RE: Dahlia Hayes  [address]  [facility] [address]     Dear Colleague,    Thank you for referring your patient, Dahlia Hayes, to the [facility] [place] at [facility]. Please see a copy of my visit note below.    [facility] Dermatology Note  Encounter Date: May 29, 2025  Telephone ([phone]). Location of teledermatologist: [facility] [place]. Start time: 2:37pm. End time:2:51    Dermatology Problem List:  1. Hidradenitis suppurativa  - spironolactone 100 mg bid, infliximab every 4 weeks, doxycycline 100 mg bid, resorcinol cream  2. Acne  - spironolactone 100 mg bid  3. Ganglion cyst, left wrist  4. Mirena IUD  SH: Works [facility]    ____________________________________________    Assessment & Plan:   # Hidradenitis Suppurativa   - Continues to improve  - Continue infliximab infusions every 4 weeks. Will get infliximab level with next infusion  - Continue doxycycline 100 mg twice a day   - Increase spironolactone f to 100mg twice a day  - Resorcinol cream prescribed if flare occurs (sent [facility])   [facility]  Address: [address] [place] [address]  Phone: [phone]  - Recommend shingles, and pneumonia vaccines with PCP      # Acne vulgaris.    - Continue spironolactone 200 mg Qday    Follow-up 3 months via phone    Staff:     Karl Baum MD, FAAD, FACP     Departments of Internal Medicine and Dermatology  [facility]    ____________________________________________    CC: Derm Problem (HS: no new breakouts, overall is \"a lot better\" per patient/Same areas re-open, due to friction per patient: under breasts/Patient will send photos later today.)    HPI:  Ms. Dahlia Hayes is a(n) 34 year old female who presents today as a return patient for HS    Last visit " 08/31/23  Patient was to continue infliximab infusions every 6 week, doxycycline 100 mg BID. Spironolactone was increased to 100 mg BID. And patient was prescribed resorcinol cream for flare ups.   Doing better afternoon.   Patient is otherwise feeling well, without additional skin concerns.    HS Nurse Assessment        5/25/2023     3:34 PM 8/31/2023     4:41 PM 5/29/2025     1:14 PM   Nurse Assessment Data   Over the past 30 days how many old lesions flared back up? 4 7 6   Over the past 30 days how many new lesions did you get? 6 2 0   Over the past week, how many dressing changes do you do each day? 2 1 0   Over the past week, has your wound drainage been: Moderate Moderate Mild   Rate your HS overall from 0 - 10 (0 = no disease, 10 = worst) over the past week:  6 4 4   Rate your pain score from 0 - 10 (0 = no disease, 10 = worst) for the most painful/symptomatic lesion in the past week:  5 - Moderate Pain 5 - Moderate Pain 1   Over the past week, how much has HS influenced your quality of life? very much moderately slightly       Medications:  Current Outpatient Medications   Medication Sig Dispense Refill     amphetamine-dextroamphetamine (ADDERALL XR) 10 MG per capsule 5 mg daily       amphetamine-dextroamphetamine (ADDERALL XR) 30 MG per capsule Take 20 mg by mouth daily       busPIRone (BUSPAR) 15 MG tablet  (Patient taking differently: Take 15 mg by mouth 2 times daily.)       doxycycline monohydrate (MONODOX) 100 MG capsule Take 1 capsule (100 mg) by mouth 2 times daily. 180 capsule 1     escitalopram (LEXAPRO) 10 MG tablet Take 10 mg by mouth daily        inFLIXimab-dyyb (INFLECTRA) 600 mg in sodium chloride 0.9 % 250 mL via gravity infusion Infuse 600 mg over 1 hours into the vein once every six weeks. When complete, flush bag with 20 mL NS to flush tubing. [id number] mL 0     Semaglutide-Weight Management (WEGOVY) 2.4 MG/0.75ML pen Inject 2.4 mg subcutaneously once a week.       sodium chloride, PF, 0.9%  PF flush Inject 10 mLs into the vein as needed for line flush. Flush IV before and after medication administration as directed and/or at least every 12 hours. [id number] mL 0     spironolactone (ALDACTONE) 100 MG tablet Take 1 tablet (100 mg) by mouth 2 times daily. 180 tablet 1     PANTOPRAZOLE SODIUM PO Take 40 mg by mouth 2 times daily (before meals) (Patient not taking: Reported on 5/29/2025)       No current facility-administered medications for this visit.      Past Medical/Surgical History:   Patient Active Problem List   Diagnosis     Pneumonia     Hidradenitis suppurativa     Past Medical History:   Diagnosis Date     ADHD (attention deficit hyperactivity disorder)     adderall      Anxiety     lexapro  Dr. Carrillo PN      breast lumps 2015    benign on mammogram      Chronic tonsillitis        Again, thank you for allowing me to participate in the care of your patient.      Sincerely,    Karl Baum MD

## 2025-05-29 NOTE — TELEPHONE ENCOUNTER
Patient called re best she could get for appt is in Sept - she needs to be seen before end of June - please call back [phone] Thank you

## 2025-05-29 NOTE — PATIENT INSTRUCTIONS
HS   - Continue infliximab infusions every 6 weeks  - Will get infliximab level with next infusion  - Continue doxycycline 100 mg twice a day   - Continue spironolactonbe 100mg twice a day  - Resorcinol cream prescribed if flare occurs (sent to [facility] [place])   [facility]  Address: [address] [place] [address], [place] [address]  Phone: [phone]  - Recommend shingles, and pneumonia vaccines with PCP

## 2025-05-29 NOTE — NURSING NOTE
Problem: Skin Injury Risk Increased  Goal: Skin Health and Integrity  3/28/2024 0646 by Jf Costa RN  Outcome: Ongoing, Progressing  3/28/2024 0646 by Jf Costa RN  Outcome: Ongoing, Progressing  3/28/2024 0645 by Jf Costa RN  Outcome: Ongoing, Progressing  Intervention: Optimize Skin Protection  Recent Flowsheet Documentation  Taken 3/28/2024 0600 by Jf Costa RN  Pressure Reduction Techniques: weight shift assistance provided  Head of Bed (HOB) Positioning: HOB at 30-45 degrees  Pressure Reduction Devices: pressure-redistributing mattress utilized  Skin Protection:   adhesive use limited   incontinence pads utilized  Taken 3/28/2024 0400 by Jf Costa RN  Pressure Reduction Techniques: weight shift assistance provided  Head of Bed (HOB) Positioning: HOB at 30-45 degrees  Pressure Reduction Devices:   pressure-redistributing mattress utilized   heel offloading device utilized  Skin Protection:   adhesive use limited   incontinence pads utilized  Taken 3/28/2024 0200 by Jf Costa RN  Pressure Reduction Techniques: frequent weight shift encouraged  Head of Bed (HOB) Positioning: HOB at 30-45 degrees  Pressure Reduction Devices:   pressure-redistributing mattress utilized   heel offloading device utilized  Skin Protection:   adhesive use limited   incontinence pads utilized  Taken 3/28/2024 0000 by Jf Costa RN  Pressure Reduction Techniques: frequent weight shift encouraged  Head of Bed (HOB) Positioning: HOB at 30-45 degrees  Pressure Reduction Devices: pressure-redistributing mattress utilized  Skin Protection:   adhesive use limited   incontinence pads utilized  Taken 3/27/2024 2200 by Jf Costa RN  Pressure Reduction Techniques: frequent weight shift encouraged  Head of Bed (HOB) Positioning: HOB at 30-45 degrees  Pressure Reduction Devices: pressure-redistributing mattress utilized  Skin Protection:   adhesive use limited   incontinence pads  "Dermatology Rooming Note    Dahlia Hayes's goals for this visit include:   Chief Complaint   Patient presents with    Derm Problem     HS: no new breakouts, overall is \"a lot better\" per patient  Same areas re-open, due to friction per patient: under breasts     Dulce Gibbons LPN    " utilized  Taken 3/27/2024 2000 by Jf Costa RN  Pressure Reduction Techniques: frequent weight shift encouraged  Head of Bed (HOB) Positioning: HOB at 30-45 degrees  Pressure Reduction Devices: pressure-redistributing mattress utilized  Skin Protection:   adhesive use limited   incontinence pads utilized   Goal Outcome Evaluation:           Progress: improving

## 2025-05-29 NOTE — PROGRESS NOTES
"[facility] Dermatology Note  Encounter Date: May 29, 2025  Telephone ([phone]). Location of teledermatologist: [facility] [place]. Start time: 2:37pm. End time:2:51    Dermatology Problem List:  1. Hidradenitis suppurativa  - spironolactone 100 mg bid, infliximab every 4 weeks, doxycycline 100 mg bid, resorcinol cream  2. Acne  - spironolactone 100 mg bid  3. Ganglion cyst, left wrist  4. Mirena IUD  SH: [facility] [place]    ____________________________________________    Assessment & Plan:   # Hidradenitis Suppurativa   - Continues to improve  - Continue infliximab infusions every 4 weeks. Will get infliximab level with next infusion  - Continue doxycycline 100 mg twice a day   - Increase spironolactone f to 100mg twice a day  - Resorcinol cream prescribed if flare occurs (sent to [facility])   [facility]  Address: [address] [place] [address]  Phone: [phone]  - Recommend shingles, and pneumonia vaccines with PCP      # Acne vulgaris.    - Continue spironolactone 200 mg Qday    Follow-up 3 months via phone    Staff:     Karl Baum MD, FAAD, FACP     Departments of Internal Medicine and Dermatology  [facility]    ____________________________________________    CC: Derm Problem (HS: no new breakouts, overall is \"a lot better\" per patient/Same areas re-open, due to friction per patient: under breasts/Patient will send photos later today.)    HPI:  Ms. Dahlia Hayes is a(n) 34 year old female who presents today as a return patient for HS    Last visit 08/31/23  Patient was to continue infliximab infusions every 6 week, doxycycline 100 mg BID. Spironolactone was increased to 100 mg BID. And patient was prescribed resorcinol cream for flare ups.   Doing better afternoon.   Patient is otherwise feeling well, without additional skin concerns.     Nurse Assessment        5/25/2023     " 3:34 PM 8/31/2023     4:41 PM 5/29/2025     1:14 PM   Nurse Assessment Data   Over the past 30 days how many old lesions flared back up? 4 7 6   Over the past 30 days how many new lesions did you get? 6 2 0   Over the past week, how many dressing changes do you do each day? 2 1 0   Over the past week, has your wound drainage been: Moderate Moderate Mild   Rate your HS overall from 0 - 10 (0 = no disease, 10 = worst) over the past week:  6 4 4   Rate your pain score from 0 - 10 (0 = no disease, 10 = worst) for the most painful/symptomatic lesion in the past week:  5 - Moderate Pain 5 - Moderate Pain 1   Over the past week, how much has HS influenced your quality of life? very much moderately slightly       Medications:  Current Outpatient Medications   Medication Sig Dispense Refill    amphetamine-dextroamphetamine (ADDERALL XR) 10 MG per capsule 5 mg daily      amphetamine-dextroamphetamine (ADDERALL XR) 30 MG per capsule Take 20 mg by mouth daily      busPIRone (BUSPAR) 15 MG tablet  (Patient taking differently: Take 15 mg by mouth 2 times daily.)      doxycycline monohydrate (MONODOX) 100 MG capsule Take 1 capsule (100 mg) by mouth 2 times daily. 180 capsule 1    escitalopram (LEXAPRO) 10 MG tablet Take 10 mg by mouth daily       inFLIXimab-dyyb (INFLECTRA) 600 mg in sodium chloride 0.9 % 250 mL via gravity infusion Infuse 600 mg over 1 hours into the vein once every six weeks. When complete, flush bag with 20 mL NS to flush tubing. 510610 mL 0    Semaglutide-Weight Management (WEGOVY) 2.4 MG/0.75ML pen Inject 2.4 mg subcutaneously once a week.      sodium chloride, PF, 0.9% PF flush Inject 10 mLs into the vein as needed for line flush. Flush IV before and after medication administration as directed and/or at least every 12 hours. [id number] mL 0    spironolactone (ALDACTONE) 100 MG tablet Take 1 tablet (100 mg) by mouth 2 times daily. 180 tablet 1    PANTOPRAZOLE SODIUM PO Take 40 mg by mouth 2 times daily (before  meals) (Patient not taking: Reported on 5/29/2025)       No current facility-administered medications for this visit.      Past Medical/Surgical History:   Patient Active Problem List   Diagnosis    Pneumonia    Hidradenitis suppurativa     Past Medical History:   Diagnosis Date    ADHD (attention deficit hyperactivity disorder)     adderall     Anxiety     lexapro  Dr. Carrillo PN     breast lumps 2015    benign on mammogram     Chronic tonsillitis

## 2025-06-02 ENCOUNTER — PATIENT OUTREACH (OUTPATIENT)
Dept: CARE COORDINATION | Facility: CLINIC | Age: 35
End: 2025-06-02
Payer: COMMERCIAL

## 2025-06-03 ENCOUNTER — EPISODE UPDATE (OUTPATIENT)
Dept: HOME HEALTH SERVICES | Facility: HOME HEALTH | Age: 35
End: 2025-06-03
Payer: COMMERCIAL

## 2025-06-05 ENCOUNTER — TELEPHONE (OUTPATIENT)
Dept: DERMATOLOGY | Facility: CLINIC | Age: 35
End: 2025-06-05
Payer: COMMERCIAL

## 2025-06-05 NOTE — TELEPHONE ENCOUNTER
6/5 Left Voicemail (1st Attempt) and Sent Mychart (1st Attempt) for the patient to call back and schedule the following:    Appointment type: Return HS  Provider: Sarika  Return date: 11/29/25  Specialty phone number: [phone]  Additional appointment(s) needed: na  Additonal Notes: phone visit

## 2025-06-06 NOTE — PROCEDURE: HIGH RISK MEDICATION MONITORING
Please inform the patient the medication was sent to the pharmacy on file.    Azathioprine Counseling:  I discussed with the patient the risks of azathioprine including but not limited to myelosuppression, immunosuppression, hepatotoxicity, lymphoma, and infections.  The patient understands that monitoring is required including baseline LFTs, Creatinine, possible TPMP genotyping and weekly CBCs for the first month and then every 2 weeks thereafter.  The patient verbalized understanding of the proper use and possible adverse effects of azathioprine.  All of the patient's questions and concerns were addressed.

## 2025-06-10 ENCOUNTER — HOME INFUSION (OUTPATIENT)
Dept: HOME HEALTH SERVICES | Facility: HOME HEALTH | Age: 35
End: 2025-06-10
Payer: COMMERCIAL

## 2025-06-16 ENCOUNTER — HOME INFUSION (OUTPATIENT)
Dept: HOME HEALTH SERVICES | Facility: HOME HEALTH | Age: 35
End: 2025-06-16
Payer: COMMERCIAL

## 2025-06-17 ENCOUNTER — HOME INFUSION BILLING (OUTPATIENT)
Dept: HOME HEALTH SERVICES | Facility: HOME HEALTH | Age: 35
End: 2025-06-17
Payer: COMMERCIAL

## 2025-06-18 ENCOUNTER — HOME INFUSION BILLING (OUTPATIENT)
Dept: HOME HEALTH SERVICES | Facility: HOME HEALTH | Age: 35
End: 2025-06-18
Payer: COMMERCIAL

## 2025-06-18 ENCOUNTER — HOME CARE VISIT (OUTPATIENT)
Dept: HOME HEALTH SERVICES | Facility: HOME HEALTH | Age: 35
End: 2025-06-18
Payer: COMMERCIAL

## 2025-06-18 VITALS
RESPIRATION RATE: 14 BRPM | OXYGEN SATURATION: 100 % | WEIGHT: 229.6 LBS | HEART RATE: 64 BPM | BODY MASS INDEX: 39.2 KG/M2 | DIASTOLIC BLOOD PRESSURE: 87 MMHG | SYSTOLIC BLOOD PRESSURE: 131 MMHG | TEMPERATURE: 97.1 F

## 2025-06-18 PROCEDURE — 99602 HOME NFS VISIT EACH ADDL HR: CPT | Mod: SS

## 2025-06-18 PROCEDURE — 99601 HOME NFS VISIT <2 HRS: CPT | Mod: SS

## 2025-06-18 PROCEDURE — S9338 HIT IMMUNOTHERAPY DIEM: HCPCS

## 2025-06-18 NOTE — PROGRESS NOTES
Nursing Visit Note:  Nurse visit today for Inflectra for Dahlia M Freddy.     present during visit today: Not Applicable.    Intravenous Access:  Peripheral IV placed.    Infusion Nursing Note:    Pre-infusion Checklist:   Have you had any delayed reaction since last infusion?   No     Have you recently had an elevated temperature, fever, chills productive cough, coughing for 3 weeks or longer or hemoptysis, abnormal vital signs, night sweats, chest pain, or have you noticed a decrease in your appetite, or noted unexplained weight loss or fatigue?   No     Do you have any open wounds or new incisions?  No     Do you have any recent or upcoming hospitalizations, surgeries, or dental procedures? Does not include esophagogastroduodenoscopy, colonoscopy, endoscopic retrograde cholangiopancreatography (ERCP), endoscopic ultrasound (EUS), dental procedures or joint aspiration/steroid injections.   No     Do you currently have or recently have had any signs of illness or infection or are you on any antibiotics?   No     Have you had any new, sudden, or worsening abdominal pain?   No     Have you or anyone in your household received a live vaccination in the past 4 weeks?   No     Have you recently been diagnosed with any new nervous system diseases or cancer diagnosis? (i.e., Multiple Sclerosis, Guillain Maxwell, seizures, neurological changes) Are you receiving any form of radiation or chemotherapy?   No     Are you pregnant or breastfeeding, or do you have plans of pregnancy in the future?   No     Have you been having any signs of worsening depression or suicidal ideation?  No     Have you had any other new onset medical symptoms?  No    Entyvio/Ocrevus/Tyasabri only: Have you been having any new or worsening medical problems such as issues with thinking, eyesight, balance or strength that have persisted over several days?   N/A    Benlysta only: Have you been having any signs of worsening depression or  "suicidal ideations?    N/A    IVIG only: Have you had any new blood clots?  N/A    Did the patient answer \"YES\" to any of the questions above?  No     Will the patient receive a medication that has an order for infusion reaction management?  Yes, and all drugs and supplies are available and none have .     If ordered, has the patient taken pre-medications?  N/A    Plan:   Therapy is appropriate, will proceed with treatment.     Post Infusion Assessment:  Patient tolerated infusion without incident.  Site patent and intact, free from redness, edema or discomfort.  Access discontinued per protocol.     Note: VSS, Patient reports she is overall doing well. Inflectra is managing symptoms well. Patients states continues to have flares under bilateral breasts with no drainage. Patient notices a flare to the breasts when she would be menstruating and calms down a few days later. Patient states about a 1.5 weeks ago she had a flare to bilateral thighs with no drainage. She reports pain last night was 5/10 and 0/10 today. Patient denies and questions or concerns.     Saline administered: 20 (ml) added to bag post infusion.     Supply Check:   Does the patient have all the supplies they need for the next visit?  Yes    Next visit plan: Inflectra 25 @1000.    Fanta Eckert RN 2025  "

## 2025-07-24 DIAGNOSIS — L73.2 HIDRADENITIS SUPPURATIVA: ICD-10-CM

## 2025-07-29 ENCOUNTER — HOME INFUSION BILLING (OUTPATIENT)
Dept: HOME HEALTH SERVICES | Facility: HOME HEALTH | Age: 35
End: 2025-07-29
Payer: COMMERCIAL

## 2025-07-30 ENCOUNTER — LAB REQUISITION (OUTPATIENT)
Dept: LAB | Facility: CLINIC | Age: 35
End: 2025-07-30
Payer: COMMERCIAL

## 2025-07-30 ENCOUNTER — HOME INFUSION BILLING (OUTPATIENT)
Dept: HOME HEALTH SERVICES | Facility: HOME HEALTH | Age: 35
End: 2025-07-30
Payer: COMMERCIAL

## 2025-07-30 ENCOUNTER — HOME CARE VISIT (OUTPATIENT)
Dept: HOME HEALTH SERVICES | Facility: HOME HEALTH | Age: 35
End: 2025-07-30
Payer: COMMERCIAL

## 2025-07-30 VITALS
WEIGHT: 224.2 LBS | SYSTOLIC BLOOD PRESSURE: 109 MMHG | OXYGEN SATURATION: 99 % | BODY MASS INDEX: 38.28 KG/M2 | DIASTOLIC BLOOD PRESSURE: 60 MMHG | RESPIRATION RATE: 16 BRPM | TEMPERATURE: 97.8 F | HEART RATE: 69 BPM

## 2025-07-30 DIAGNOSIS — L73.2 HIDRADENITIS SUPPURATIVA: ICD-10-CM

## 2025-07-30 LAB
ALBUMIN SERPL BCG-MCNC: 4.1 G/DL (ref 3.5–5.2)
ALP SERPL-CCNC: 82 U/L (ref 40–150)
ALT SERPL W P-5'-P-CCNC: 17 U/L (ref 0–50)
ANION GAP SERPL CALCULATED.3IONS-SCNC: 15 MMOL/L (ref 7–15)
AST SERPL W P-5'-P-CCNC: 17 U/L (ref 0–45)
BASOPHILS # BLD AUTO: 0 10E3/UL (ref 0–0.2)
BASOPHILS NFR BLD AUTO: 0 %
BILIRUB SERPL-MCNC: 0.2 MG/DL
BUN SERPL-MCNC: 8.6 MG/DL (ref 6–20)
CALCIUM SERPL-MCNC: 9 MG/DL (ref 8.8–10.4)
CHLORIDE SERPL-SCNC: 102 MMOL/L (ref 98–107)
CREAT SERPL-MCNC: 0.66 MG/DL (ref 0.51–0.95)
EGFRCR SERPLBLD CKD-EPI 2021: >90 ML/MIN/1.73M2
EOSINOPHIL # BLD AUTO: 0.1 10E3/UL (ref 0–0.7)
EOSINOPHIL NFR BLD AUTO: 1 %
ERYTHROCYTE [DISTWIDTH] IN BLOOD BY AUTOMATED COUNT: 12.4 % (ref 10–15)
GLUCOSE SERPL-MCNC: 101 MG/DL (ref 70–99)
HCO3 SERPL-SCNC: 22 MMOL/L (ref 22–29)
HCT VFR BLD AUTO: 39.5 % (ref 35–47)
HGB BLD-MCNC: 13.8 G/DL (ref 11.7–15.7)
IMM GRANULOCYTES # BLD: 0 10E3/UL
IMM GRANULOCYTES NFR BLD: 0 %
LYMPHOCYTES # BLD AUTO: 3 10E3/UL (ref 0.8–5.3)
LYMPHOCYTES NFR BLD AUTO: 28 %
MCH RBC QN AUTO: 31.6 PG (ref 26.5–33)
MCHC RBC AUTO-ENTMCNC: 34.9 G/DL (ref 31.5–36.5)
MCV RBC AUTO: 90 FL (ref 78–100)
MONOCYTES # BLD AUTO: 0.8 10E3/UL (ref 0–1.3)
MONOCYTES NFR BLD AUTO: 7 %
NEUTROPHILS # BLD AUTO: 6.6 10E3/UL (ref 1.6–8.3)
NEUTROPHILS NFR BLD AUTO: 62 %
NRBC # BLD AUTO: 0 10E3/UL
NRBC BLD AUTO-RTO: 0 /100
PLATELET # BLD AUTO: 384 10E3/UL (ref 150–450)
POTASSIUM SERPL-SCNC: 4.2 MMOL/L (ref 3.4–5.3)
PROT SERPL-MCNC: 7.5 G/DL (ref 6.4–8.3)
RBC # BLD AUTO: 4.37 10E6/UL (ref 3.8–5.2)
SODIUM SERPL-SCNC: 139 MMOL/L (ref 135–145)
WBC # BLD AUTO: 10.6 10E3/UL (ref 4–11)

## 2025-07-30 PROCEDURE — 99601 HOME NFS VISIT <2 HRS: CPT | Mod: SS

## 2025-07-30 PROCEDURE — A4215 STERILE NEEDLE: HCPCS

## 2025-07-30 PROCEDURE — S9338 HIT IMMUNOTHERAPY DIEM: HCPCS

## 2025-07-30 PROCEDURE — 85025 COMPLETE CBC W/AUTO DIFF WBC: CPT | Performed by: DERMATOLOGY

## 2025-07-30 PROCEDURE — 80053 COMPREHEN METABOLIC PANEL: CPT | Performed by: DERMATOLOGY

## 2025-07-30 PROCEDURE — S1015 IV TUBING EXTENSION SET: HCPCS

## 2025-07-30 NOTE — PROGRESS NOTES
Nursing Visit Note:  Nurse visit today for Inflectra  for Dahlia Hayes.     present during visit today: Not Applicable.    Intravenous Access:  Labs drawn without difficulty. and Peripheral IV placed.    Infusion Nursing Note:    Pre-infusion Checklist:   Have you had any delayed reaction since last infusion?   No     Have you recently had an elevated temperature, fever, chills productive cough, coughing for 3 weeks or longer or hemoptysis, abnormal vital signs, night sweats, chest pain, or have you noticed a decrease in your appetite, or noted unexplained weight loss or fatigue?   No     Do you have any open wounds or new incisions?  Yes, MD aware     Do you have any recent or upcoming hospitalizations, surgeries, or dental procedures? Does not include esophagogastroduodenoscopy, colonoscopy, endoscopic retrograde cholangiopancreatography (ERCP), endoscopic ultrasound (EUS), dental procedures or joint aspiration/steroid injections.   No     Do you currently have or recently have had any signs of illness or infection or are you on any antibiotics?   Yes, Doxycycline MD aware     Have you had any new, sudden, or worsening abdominal pain?   No     Have you or anyone in your household received a live vaccination in the past 4 weeks?   No     Have you recently been diagnosed with any new nervous system diseases or cancer diagnosis? (i.e., Multiple Sclerosis, Guillain Driver, seizures, neurological changes) Are you receiving any form of radiation or chemotherapy?   No     Are you pregnant or breastfeeding, or do you have plans of pregnancy in the future?   No     Have you been having any signs of worsening depression or suicidal ideation?  No     Have you had any other new onset medical symptoms?  No    Entyvio/Ocrevus/Tyasabri only: Have you been having any new or worsening medical problems such as issues with thinking, eyesight, balance or strength that have persisted over several days?  "  N/A    Benlysta only: Have you been having any signs of worsening depression or suicidal ideations?    N/A    IVIG only: Have you had any new blood clots?  N/A    Did the patient answer \"YES\" to any of the questions above?  Yes, hold the infusion and call the provider:  MD chavarria.     Will the patient receive a medication that has an order for infusion reaction management?  Yes, and all drugs and supplies are available and none have .     If ordered, has the patient taken pre-medications?  N/A    Plan:   Therapy is appropriate, will proceed with treatment.     Post Infusion Assessment:  Patient tolerated infusion without incident.  Site patent and intact, free from redness, edema or discomfort.  No evidence of extravasations.  Access discontinued per protocol.      and Lab-Only Nursing Note    Labs obtained via VPT    Time Specimen drawn: 1017    Last dose (if applicable): No    [facility] Tracking number: [id number]    Note: Pt is feeling well for her infusion today. VSS. Pt states that she has developed more leisions recently. They are under both breasts and one on left hip/groin area. They have been draining. Pt was given Resorcinol to put on those areas. Pt tolerated the infusion without any adverse effects.    Saline administered: 10 ml NS flush with IV insertion 1017  10 ml NS flush after lab draw 1018  20 ml NS flush into bag after Inflectra completed 1150 (ml)    Supply Check:   Does the patient have all the supplies they need for the next visit?  Yes    Next visit plan: 1000    Danya Vance RN 2025  "

## 2025-08-15 ENCOUNTER — LAB (OUTPATIENT)
Dept: LAB | Facility: CLINIC | Age: 35
End: 2025-08-15
Payer: COMMERCIAL

## 2025-08-15 DIAGNOSIS — L73.2 HIDRADENITIS SUPPURATIVA: ICD-10-CM

## 2025-08-15 LAB
ALBUMIN SERPL BCG-MCNC: 4.3 G/DL (ref 3.5–5.2)
ALP SERPL-CCNC: 93 U/L (ref 40–150)
ALT SERPL W P-5'-P-CCNC: 19 U/L (ref 0–50)
ANION GAP SERPL CALCULATED.3IONS-SCNC: 11 MMOL/L (ref 7–15)
AST SERPL W P-5'-P-CCNC: 23 U/L (ref 0–45)
BASOPHILS # BLD AUTO: 0.05 10E3/UL (ref 0–0.2)
BASOPHILS NFR BLD AUTO: 0.5 %
BILIRUB SERPL-MCNC: 0.5 MG/DL
BUN SERPL-MCNC: 11.9 MG/DL (ref 6–20)
CALCIUM SERPL-MCNC: 9.8 MG/DL (ref 8.8–10.4)
CHLORIDE SERPL-SCNC: 103 MMOL/L (ref 98–107)
CREAT SERPL-MCNC: 0.77 MG/DL (ref 0.51–0.95)
EGFRCR SERPLBLD CKD-EPI 2021: >90 ML/MIN/1.73M2
EOSINOPHIL # BLD AUTO: 0.15 10E3/UL (ref 0–0.7)
EOSINOPHIL NFR BLD AUTO: 1.5 %
ERYTHROCYTE [DISTWIDTH] IN BLOOD BY AUTOMATED COUNT: 12.5 % (ref 10–15)
GLUCOSE SERPL-MCNC: 105 MG/DL (ref 70–99)
HCO3 SERPL-SCNC: 24 MMOL/L (ref 22–29)
HCT VFR BLD AUTO: 42.5 % (ref 35–47)
HGB BLD-MCNC: 14.1 G/DL (ref 11.7–15.7)
IMM GRANULOCYTES # BLD: <0.04 10E3/UL
IMM GRANULOCYTES NFR BLD: 0.2 %
LYMPHOCYTES # BLD AUTO: 2.94 10E3/UL (ref 0.8–5.3)
LYMPHOCYTES NFR BLD AUTO: 28.6 %
MCH RBC QN AUTO: 30.7 PG (ref 26.5–33)
MCHC RBC AUTO-ENTMCNC: 33.2 G/DL (ref 31.5–36.5)
MCV RBC AUTO: 92.4 FL (ref 78–100)
MONOCYTES # BLD AUTO: 0.61 10E3/UL (ref 0–1.3)
MONOCYTES NFR BLD AUTO: 5.9 %
NEUTROPHILS # BLD AUTO: 6.51 10E3/UL (ref 1.6–8.3)
NEUTROPHILS NFR BLD AUTO: 63.3 %
PLATELET # BLD AUTO: 365 10E3/UL (ref 150–450)
POTASSIUM SERPL-SCNC: 4.9 MMOL/L (ref 3.4–5.3)
PROT SERPL-MCNC: 7.9 G/DL (ref 6.4–8.3)
RBC # BLD AUTO: 4.6 10E6/UL (ref 3.8–5.2)
SODIUM SERPL-SCNC: 138 MMOL/L (ref 135–145)
WBC # BLD AUTO: 10.28 10E3/UL (ref 4–11)

## 2025-08-15 PROCEDURE — 80053 COMPREHEN METABOLIC PANEL: CPT

## 2025-08-15 PROCEDURE — 85025 COMPLETE CBC W/AUTO DIFF WBC: CPT

## 2025-08-15 PROCEDURE — 99000 SPECIMEN HANDLING OFFICE-LAB: CPT

## 2025-08-15 PROCEDURE — 80230 DRUG ASSAY INFLIXIMAB: CPT | Mod: 90

## 2025-08-15 PROCEDURE — 82542 COL CHROMOTOGRAPHY QUAL/QUAN: CPT | Mod: 90

## 2025-08-15 PROCEDURE — 36415 COLL VENOUS BLD VENIPUNCTURE: CPT

## 2025-08-21 LAB
INFLIXIMAB AB SERPL IA-MCNC: <3.1 U/ML
INFLIXIMAB SERPL-MCNC: 52.8 UG/ML

## (undated) RX ORDER — TRIAMCINOLONE ACETONIDE 40 MG/ML
INJECTION, SUSPENSION INTRA-ARTICULAR; INTRAMUSCULAR
Status: DISPENSED
Start: 2023-05-25